# Patient Record
Sex: FEMALE | Race: WHITE | NOT HISPANIC OR LATINO | Employment: OTHER | URBAN - METROPOLITAN AREA
[De-identification: names, ages, dates, MRNs, and addresses within clinical notes are randomized per-mention and may not be internally consistent; named-entity substitution may affect disease eponyms.]

---

## 2017-01-18 ENCOUNTER — TRANSFERRED RECORDS (OUTPATIENT)
Dept: HEALTH INFORMATION MANAGEMENT | Facility: CLINIC | Age: 82
End: 2017-01-18

## 2017-01-20 ENCOUNTER — OFFICE VISIT (OUTPATIENT)
Dept: FAMILY MEDICINE | Facility: CLINIC | Age: 82
End: 2017-01-20

## 2017-01-20 VITALS
TEMPERATURE: 98.2 F | SYSTOLIC BLOOD PRESSURE: 138 MMHG | DIASTOLIC BLOOD PRESSURE: 86 MMHG | HEART RATE: 73 BPM | OXYGEN SATURATION: 92 % | BODY MASS INDEX: 24.7 KG/M2 | WEIGHT: 132 LBS | RESPIRATION RATE: 16 BRPM

## 2017-01-20 DIAGNOSIS — S69.91XA HAND TRAUMA, RIGHT, INITIAL ENCOUNTER: Primary | ICD-10-CM

## 2017-01-20 DIAGNOSIS — I10 ESSENTIAL HYPERTENSION, BENIGN: ICD-10-CM

## 2017-01-20 DIAGNOSIS — E03.4 HYPOTHYROIDISM DUE TO ACQUIRED ATROPHY OF THYROID: ICD-10-CM

## 2017-01-20 DIAGNOSIS — E78.2 MIXED HYPERLIPIDEMIA: ICD-10-CM

## 2017-01-20 ASSESSMENT — ENCOUNTER SYMPTOMS
FATIGUE: 0
DYSURIA: 0
CHEST TIGHTNESS: 0
DIARRHEA: 0
MYALGIAS: 0
DYSPHORIC MOOD: 0
ARTHRALGIAS: 0
NERVOUS/ANXIOUS: 0
FEVER: 0
VOMITING: 0
NUMBNESS: 0
EYES NEGATIVE: 1
ABDOMINAL DISTENTION: 0
CONSTIPATION: 0
SLEEP DISTURBANCE: 0
POLYDIPSIA: 0
BLOOD IN STOOL: 0
COUGH: 0
PALPITATIONS: 0
COLOR CHANGE: 0
ABDOMINAL PAIN: 0
DIFFICULTY URINATING: 0
SHORTNESS OF BREATH: 0

## 2017-01-20 NOTE — PATIENT INSTRUCTIONS
Here is the plan from today's visit    1. Hand trauma, right, initial encounter      2. Essential hypertension, benign goal <150  LABS CAN BE DONE WHEN YOU COME IN WITH YOUR  SCHEDULE A NURSE ONLY VISIT TO HAVE YOUR BP CHECKED   - Comprehensive Metabolic Panel ([facility]); Future  - Hemoglobin A1c ([facility]); Future  - Lipid Panel ([facility]); Future    3. Mixed hyperlipidemia      4. Hypothyroidism due to acquired atrophy of thyroid    - TSH with free T4 reflex      Please call or return to clinic if your symptoms don't go away.    Follow up plan  SCHEDULE NURSE ONLY VISIT FOR BP AND LABS    Thank you for coming to [facility] today.  Lab Testing:  **If you had lab testing today and your results are reassuring or normal they will be mailed to you or sent through [employer] within 7 days.   **If the lab tests need quick action we will call you with the results.  The phone number we will call with results is # [phone] (home) . If this is not the best number please call our clinic and change the number.  Medication Refills:  If you need any refills please call your pharmacy and they will contact us.   If you need to  your refill at a new pharmacy, please contact the new pharmacy directly. The new pharmacy will help you get your medications transferred faster.   Scheduling:  If you have any concerns about today's visit or wish to schedule another appointment please call our office during normal business hours [phone] (8-5:00 M-F)  If a referral was made to a [facility] and you don't get a call from central scheduling please call [phone].  If a Mammogram was ordered for you at [facility] call [phone] to schedule or change your appointment.  If you had an XRay/CT/Ultrasound/MRI ordered the number is [phone] to schedule or change your radiology appointment.   Medical Concerns:  If you have urgent medical concerns please call [phone] at any time of the  day.

## 2017-01-20 NOTE — MR AVS SNAPSHOT
After Visit Summary   1/20/2017    Brooke Xie    MRN: [id number]           Patient Information     Date Of Birth          1/28/1933        Visit Information        Provider Department      1/20/2017 10:20 AM Martin Walton MD [facility]        Today's Diagnoses     Hand trauma, right, initial encounter    -  1     Essential hypertension, benign goal <150         Mixed hyperlipidemia         Hypothyroidism due to acquired atrophy of thyroid           Care Instructions    Here is the plan from today's visit    1. Hand trauma, right, initial encounter      2. Essential hypertension, benign goal <150  LABS CAN BE DONE WHEN YOU COME IN WITH YOUR  SCHEDULE A NURSE ONLY VISIT TO HAVE YOUR BP CHECKED   - Comprehensive Metabolic Panel ([facility]); Future  - Hemoglobin A1c ([facility]); Future  - Lipid Panel ([facility]); Future    3. Mixed hyperlipidemia      4. Hypothyroidism due to acquired atrophy of thyroid    - TSH with free T4 reflex      Please call or return to clinic if your symptoms don't go away.    Follow up plan  SCHEDULE NURSE ONLY VISIT FOR BP AND LABS    Thank you for coming to [facility] today.  Lab Testing:  **If you had lab testing today and your results are reassuring or normal they will be mailed to you or sent through [employer] within 7 days.   **If the lab tests need quick action we will call you with the results.  The phone number we will call with results is # [phone] (home) . If this is not the best number please call our clinic and change the number.  Medication Refills:  If you need any refills please call your pharmacy and they will contact us.   If you need to  your refill at a new pharmacy, please contact the new pharmacy directly. The new pharmacy will help you get your medications transferred faster.   Scheduling:  If you have any concerns about today's visit or wish to schedule another appointment please call our office during normal  business hours [phone] (8-5:00 M-F)  If a referral was made to a [facility] and you don't get a call from central scheduling please call [phone].  If a Mammogram was ordered for you at [facility] call [phone] to schedule or change your appointment.  If you had an XRay/CT/Ultrasound/MRI ordered the number is [phone] to schedule or change your radiology appointment.   Medical Concerns:  If you have urgent medical concerns please call [phone] at any time of the day.            Follow-ups after your visit        Your next 10 appointments already scheduled     Feb 03, 2017  9:40 AM   LAB VISIT with [facility]   [facility] ([facility])    [address],  [place] [id number] [phone]           If you are coming in for fasting labs, you will need to fast for 10-12 hours prior to your appt. Fasting labs include lipids, cholesterol, glucose, complete metabolic panel, basic metabolic panel, and triglycerides. Do not drink coffee or any other fluids. Water with medications are okay. Do not chew gum as well. If you have any further questions, please contact your health care team.              Feb 03, 2017 10:00 AM   Nurse Visit with [facility]   [facility] ([facility])    [address],  [address] [id number] [phone]              Future tests that were ordered for you today     Open Future Orders        Priority Expected Expires Ordered    TSH with free T4 reflex Routine  1/20/2018 1/20/2017    Comprehensive Metabolic Panel ([facility]) Routine  1/20/2018 1/20/2017    Hemoglobin A1c ([facility]) Routine  1/20/2018 1/20/2017    Lipid Panel ([facility]) Routine  3/21/2017 1/20/2017            Who to contact     Please call your clinic at [phone] to:    Ask questions about your health    Make or cancel appointments    Discuss your medicines    Learn about your test  results    Speak to your doctor   If you have compliments or concerns about an experience at your clinic, or if you wish to file a complaint, please contact [facility] Patient Relations at [phone] or email us at [email or web address]         Additional Information About Your Visit        [employer] Information     [employer] is an electronic gateway that provides easy, online access to your medical records. With "[employer] you can request a clinic appointment, read your test results, renew a prescription or communicate with your care team.     To sign up for "[employer] visit the website at [email or web address]   You will be asked to enter the access code listed below, as well as some personal information. Please follow the directions to create your username and password.     Your access code is: [email or web address]  Expires: 2017 11:12 AM     Your access code will  in 90 days. If you need help or a new code, please contact your [facility] or call [phone] for assistance.        Care EveryWhere ID     This is your Care EveryWhere ID. This could be used by other organizations to access your [place] medical records  [id number]        Your Vitals Were     Pulse Temperature Respirations Pulse Oximetry          73 98.2  F (36.8  C) (Oral) 16 92%         Blood Pressure from Last 3 Encounters:   17 138/86   16 137/79   09/18/15 168/82    Weight from Last 3 Encounters:   17 132 lb (59.875 kg)   16 143 lb 9.6 oz (65.137 kg)   09/18/15 141 lb (63.957 kg)                 Today's Medication Changes          These changes are accurate as of: 17  2:57 PM.  If you have any questions, ask your nurse or doctor.               Stop taking these medicines if you haven't already. Please contact your care team if you have questions.     amLODIPine 2.5 MG tablet   Commonly known as:  NORVASC   Stopped by:  Martin Walton MD            gabapentin 100 MG capsule   Commonly known as:  NEURONTIN   Stopped by:  Martin Walton MD                    Primary Care Provider Office Phone # Fax #    Martin Walton [phone]       [facility] [address] [address]        Thank you!     Thank you for choosing [facility]  for your care. Our goal is always to provide you with excellent care. Hearing back from our patients is one way we can continue to improve our services. Please take a few minutes to complete the written survey that you may receive in the mail after your visit with us. Thank you!             Your Updated Medication List - Protect others around you: Learn how to safely use, store and throw away your medicines at www.disposemymeds.org.          This list is accurate as of: 1/20/17  2:57 PM.  Always use your most recent med list.                   Brand Name Dispense Instructions for use    albuterol 108 (90 BASE) MCG/ACT Inhaler    PROAIR HFA/PROVENTIL HFA/VENTOLIN HFA    3 Inhaler    Inhale 2 puffs into the lungs every 6 hours as needed for shortness of breath / dyspnea or wheezing       ASPIRIN LOW DOSE 81 MG tablet   Generic drug:  aspirin      Take  by mouth daily.       CALTRATE 600 PLUS-VIT D OR      Take 1 tablet by mouth daily.       co-enzyme Q-10 30 MG Caps capsule      Take 30 mg by mouth daily.       fluticasone-salmeterol 100-50 MCG/DOSE diskus inhaler    ADVAIR DISKUS    60 Inhaler    Inhale 1 puff into the lungs every 12 hours       hydrochlorothiazide 25 MG tablet    HYDRODIURIL    30 tablet    Take 1 tablet (25 mg) by mouth daily       hydrocortisone 2.5 % cream      Apply  topically 2 times daily. As needed       levothyroxine 50 MCG tablet    SYNTHROID/LEVOTHROID    90 tablet    Take 1 tablet (50 mcg) by mouth daily       montelukast 10 MG tablet    SINGULAIR    90 tablet    Take 1 tablet (10 mg) by mouth daily       MULTIVITAMIN PO      Take  by mouth  daily.       OPTIVAR 0.05 % Soln ophthalmic solution   Generic drug:  azelastine      Apply 1 drop to eye 2 times daily. As directed       order for DME     1 Units    Equipment being ordered: Cane       potassium chloride 10 MEQ tablet    K-TAB,KLOR-CON    90 tablet    Take 1 tablet (10 mEq) by mouth daily       SIMPLY SALINE 0.9 % Aers   Generic drug:  saline      Spray  in nostril. prn       simvastatin 20 MG tablet    ZOCOR    90 tablet    Take 1 tablet (20 mg) by mouth At Bedtime

## 2017-01-20 NOTE — PROGRESS NOTES
HPI:       Brooke Xie is a 83 year old who presents for the following  Patient presents with:  Hypertension: follow up   Results: follow up   Recheck Medication: no longer taking gabapentin causing the swelling in legs from hips down, pt thinks the amlodipine is causing swelling in her ankles       Hypertension Follow-up      Outpatient blood pressures are not being checked.    Chest Pain? :No     Low Salt Diet: not monitoring salt    Daily NSAID Use? YES 81 mg aspirin     Did patient take their HTN pills today/last night as usual?  Yes    She does report some swelling that is bothering her even though she stopped gabapentin.            Thumb  -pt hurt her right thumb, sore rib, head, black and blue knee when falling after  walked into her without warning accidentally (he is blind)   -the thumb is black and blue with tenderness to the joint   -swelling in the thumb but getting better   -tenderness in rib but bruising is going away   -xray recommended   -able to move but having pain with movement         Labs  -last labs in April, 2015  -pt states  will have done on when  comes in for visit      Chemical hypersensitivity reactionon face from a blanket  -Dr. Gabriel dermatologist   -burns face   -when received a blanket from order and opened it the fumes burned he face, was seen and now healing       Medication  -pt states she is weaning off of the gabapentin for the swelling in legs   -was told that the amlodipine causing swelling in ankles   -discussed stopping the amlodipine will monitor BP       Adherence and Exercise  Medication side effects: would like to discuss gabapentin and amlodipine   How often is a medication missed? Never  Are you able to follow the treatment plan? Yes  Exercise:walking  6-7 days/week for an average of 30-45 minutes        Problem, Medication and Allergy Lists were reviewed and are current.  Patient is an established patient of this clinic.         Review of  Systems:   Review of Systems   Constitutional: Negative for fever and fatigue.   HENT: Negative.    Eyes: Negative.  Negative for visual disturbance.   Respiratory: Negative for cough, chest tightness and shortness of breath.    Cardiovascular: Negative for chest pain and palpitations.   Gastrointestinal: Negative for vomiting, abdominal pain, diarrhea, constipation, blood in stool and abdominal distention.   Endocrine: Negative for polydipsia and polyuria.   Genitourinary: Negative for dysuria and difficulty urinating.   Musculoskeletal: Negative for myalgias and arthralgias.   Skin: Negative for color change and rash.   Neurological: Negative for numbness.   Psychiatric/Behavioral: Negative for sleep disturbance and dysphoric mood. The patient is not nervous/anxious.              Physical Exam:     Patient Vitals for the past 24 hrs:   BP Temp Temp src Pulse Resp SpO2 Weight   01/20/17 1042 138/86 mmHg 98.2  F (36.8  C) Oral 73 16 92 % 132 lb (59.875 kg)     Body mass index is 24.7 kg/(m^2).  Vitals were reviewed and were normal       Physical Exam   Constitutional: She is oriented to person, place, and time. She appears well-developed. No distress.   HENT:   Head: Normocephalic.   Eyes: Conjunctivae are normal. No scleral icterus.   Neck: Normal range of motion. No thyromegaly present.   Cardiovascular: Normal rate, regular rhythm, normal heart sounds and intact distal pulses.    No murmur heard.  Pulmonary/Chest: Effort normal and breath sounds normal. No respiratory distress. She has no wheezes.   Abdominal: Soft. Bowel sounds are normal. She exhibits no distension. There is no splenomegaly or hepatomegaly. There is no tenderness.   Musculoskeletal: She exhibits no edema.   Lymphadenopathy:     She has no cervical adenopathy.   Neurological: She is alert and oriented to person, place, and time.   Skin: Skin is warm and dry. She is not diaphoretic.   Psychiatric: She has a normal mood and affect. Her behavior is  normal. Judgment and thought content normal.   Vitals reviewed.        Results:      Results from the last 24 hoursNo results found for this or any previous visit (from the past 24 hour(s)).  Assessment and Plan     Patient Instructions   Here is the plan from today's visit    1. Hand trauma, right, initial encounter  Patient declined Xray as it is improving, also declined splint    2. Essential hypertension, benign goal <150  Currently controlled -will stop amlodipine and check on follow up wihether this needs to be switched or restarted or just discontinued.  LABS CAN BE DONE WHEN YOU COME IN WITH YOUR  SCHEDULE A NURSE ONLY VISIT TO HAVE YOUR BP CHECKED   - Comprehensive Metabolic Panel ([facility]); Future  - Hemoglobin A1c ([facility]); Future  - Lipid Panel ([facility]); Future    3. Mixed hyperlipidemia  Continue current medications    4. Hypothyroidism due to acquired atrophy of thyroid  No change in levothyroxine  - TSH with free T4 reflex      Please call or return to clinic if your symptoms don't go away.    Follow up plan  SCHEDULE NURSE ONLY VISIT FOR BP AND LABS      Medications Discontinued During This Encounter   Medication Reason     gabapentin (NEURONTIN) 100 MG capsule      amLODIPine (NORVASC) 2.5 MG tablet      Options for treatment and follow-up care were reviewed with the patient. Brooke Xie  engaged in the decision making process and verbalized understanding of the options discussed and agreed with the final plan.    Martin Walton MD

## 2017-01-24 DIAGNOSIS — E78.2 MIXED HYPERLIPIDEMIA: Primary | ICD-10-CM

## 2017-01-24 RX ORDER — SIMVASTATIN 20 MG
20 TABLET ORAL AT BEDTIME
Qty: 90 TABLET | Refills: 3 | Status: SHIPPED | OUTPATIENT
Start: 2017-01-24 | End: 2018-02-15

## 2017-01-24 NOTE — TELEPHONE ENCOUNTER
Simvastatin 20mg tabs     Last Written Prescription Date: 01/15/2016  Last Fill Quantity: 90, # refills: 01  Last Office Visit with [facility], [facility] or [facility] prescribing provider: 01/20/2017       CHOL    191.4   4/25/2016  HDL     50.6   4/25/2016  LDL      111   4/25/2016  LDL     75.0   7/21/2014  TRIG    150.4   4/25/2016  CHOLHDLRATIO      3.8   4/25/2016

## 2017-02-03 ENCOUNTER — ALLIED HEALTH/NURSE VISIT (OUTPATIENT)
Dept: FAMILY MEDICINE | Facility: CLINIC | Age: 82
End: 2017-02-03

## 2017-02-03 VITALS — DIASTOLIC BLOOD PRESSURE: 76 MMHG | SYSTOLIC BLOOD PRESSURE: 155 MMHG

## 2017-02-03 DIAGNOSIS — I10 ESSENTIAL HYPERTENSION, BENIGN: Primary | ICD-10-CM

## 2017-02-08 DIAGNOSIS — E03.4 HYPOTHYROIDISM DUE TO ACQUIRED ATROPHY OF THYROID: Primary | ICD-10-CM

## 2017-02-08 RX ORDER — LEVOTHYROXINE SODIUM 50 UG/1
50 TABLET ORAL DAILY
Qty: 90 TABLET | Refills: 3 | Status: SHIPPED | OUTPATIENT
Start: 2017-02-08 | End: 2018-02-19

## 2017-02-08 NOTE — TELEPHONE ENCOUNTER
Levothyroxine 50mcg tabs     Last Written Prescription Date: 02/15/2016  Last Quantity: 90, # refills: 1  Last Office Visit with G, P or [facility] prescribing provider: 01/20/2017        TSH   Date Value Ref Range Status   04/25/2016 0.69 0.40 - 4.00 mU/L Final

## 2017-03-03 DIAGNOSIS — I10 ESSENTIAL HYPERTENSION, BENIGN: ICD-10-CM

## 2017-03-03 DIAGNOSIS — E03.4 HYPOTHYROIDISM DUE TO ACQUIRED ATROPHY OF THYROID: ICD-10-CM

## 2017-03-03 LAB
ALBUMIN SERPL-MCNC: 4.2 MG/DL (ref 3.5–4.7)
ALP SERPL-CCNC: 79.2 U/L (ref 31.7–110.5)
ALT SERPL-CCNC: 20.2 U/L (ref 0–45)
AST SERPL-CCNC: 22.2 U/L (ref 0–45)
BILIRUB SERPL-MCNC: 0.7 MG/DL (ref 0.2–1.3)
BUN SERPL-MCNC: 17.2 MG/DL (ref 7–19)
CALCIUM SERPL-MCNC: 9.5 MG/DL (ref 8.5–10.1)
CHLORIDE SERPLBLD-SCNC: 97 MMOL/L (ref 98–110)
CHOLEST SERPL-MCNC: 167.1 MG/DL (ref 0–200)
CHOLEST/HDLC SERPL: 3.4 {RATIO} (ref 0–5)
CO2 SERPL-SCNC: 34.3 MMOL/L (ref 20–32)
CREAT SERPL-MCNC: 0.8 MG/DL (ref 0.5–1)
GFR SERPL CREATININE-BSD FRML MDRD: 72.6 ML/MIN/1.7 M2
GLUCOSE SERPL-MCNC: 109.1 MG'DL (ref 70–99)
HBA1C MFR BLD: 5.4 % (ref 4.1–5.7)
HDLC SERPL-MCNC: 49.8 MG/DL
LDLC SERPL CALC-MCNC: 76 MG/DL (ref 0–129)
POTASSIUM SERPL-SCNC: 3.5 MMOL/DL (ref 3.3–4.5)
PROT SERPL-MCNC: 7.6 G/DL (ref 6.8–8.8)
SODIUM SERPL-SCNC: 136.6 MMOL/L (ref 132.6–141.4)
TRIGL SERPL-MCNC: 204.1 MG/DL (ref 0–150)
TSH SERPL DL<=0.005 MIU/L-ACNC: 1.07 MU/L (ref 0.4–4)
VLDL CHOLESTEROL: 40.8 MG/DL (ref 7–32)

## 2017-04-03 DIAGNOSIS — I10 BENIGN ESSENTIAL HYPERTENSION: ICD-10-CM

## 2017-04-03 NOTE — TELEPHONE ENCOUNTER
Potassium      Last Written Prescription Date: 3/31/2016  Last Fill Quantity: 90, # refills: 3  Last Office Visit with [facility], [facility] or [facility] prescribing provider: 1/20/2017       Potassium   Date Value Ref Range Status   03/03/2017 3.5 3.3 - 4.5 mmol/dL Final     Creatinine   Date Value Ref Range Status   03/03/2017 0.8 0.5 - 1.0 mg/dL Final     BP Readings from Last 3 Encounters:   02/03/17 155/76   01/20/17 138/86   04/25/16 137/79

## 2017-04-04 RX ORDER — POTASSIUM CHLORIDE 750 MG/1
10 TABLET, EXTENDED RELEASE ORAL DAILY
Qty: 90 TABLET | Refills: 3 | Status: SHIPPED | OUTPATIENT
Start: 2017-04-04 | End: 2018-04-04

## 2017-04-24 ENCOUNTER — TRANSFERRED RECORDS (OUTPATIENT)
Dept: HEALTH INFORMATION MANAGEMENT | Facility: CLINIC | Age: 82
End: 2017-04-24

## 2017-05-11 ENCOUNTER — TELEPHONE (OUTPATIENT)
Dept: FAMILY MEDICINE | Facility: CLINIC | Age: 82
End: 2017-05-11

## 2017-05-11 DIAGNOSIS — I10 ESSENTIAL HYPERTENSION, BENIGN: ICD-10-CM

## 2017-05-11 DIAGNOSIS — E78.2 MIXED HYPERLIPIDEMIA: Primary | ICD-10-CM

## 2017-05-11 NOTE — TELEPHONE ENCOUNTER
"[facility] phone call message - order or referral request for patient:     Order or referral being requested: Labs      Additional Comments: Patient is unsure of what labs but stated she needs \"full labs\". If orders need to be put in, please add them but please call her either way. She is anticipating on coming in with her  on 6/5/17    OK to leave a message on voice mail? Yes    Primary language: English      needed? No    Call taken on May 11, 2017 at 9:53 AM by Marysol Meza      "

## 2017-05-11 NOTE — TELEPHONE ENCOUNTER
Message routed to FD to schedule lab appointment if needed. Otherwise labs entered for next appointment.    Jessi Felix RN

## 2017-05-11 NOTE — TELEPHONE ENCOUNTER
Message routed to PCP to advise if labs are needed or to enter future orders for next appointment.    Jessi Felix RN

## 2017-05-30 DIAGNOSIS — I10 ESSENTIAL HYPERTENSION, BENIGN: ICD-10-CM

## 2017-05-30 RX ORDER — HYDROCHLOROTHIAZIDE 25 MG/1
25 TABLET ORAL DAILY
Qty: 30 TABLET | Refills: 6 | Status: SHIPPED | OUTPATIENT
Start: 2017-05-30 | End: 2017-11-30

## 2017-05-30 NOTE — TELEPHONE ENCOUNTER
Hctz      Last Written Prescription Date: 11/21/2016  Last Fill Quantity: 30, # refills: 6  Last Office Visit with [facility], P or [facility] prescribing provider: 1/20/2017       Potassium   Date Value Ref Range Status   03/03/2017 3.5 3.3 - 4.5 mmol/dL Final     Creatinine   Date Value Ref Range Status   03/03/2017 0.8 0.5 - 1.0 mg/dL Final     BP Readings from Last 3 Encounters:   02/03/17 155/76   01/20/17 138/86   04/25/16 137/79

## 2017-06-05 DIAGNOSIS — E78.2 MIXED HYPERLIPIDEMIA: ICD-10-CM

## 2017-06-05 DIAGNOSIS — I10 ESSENTIAL HYPERTENSION, BENIGN: ICD-10-CM

## 2017-06-05 LAB
ALBUMIN SERPL-MCNC: 4.1 MG/DL (ref 3.5–4.7)
ALP SERPL-CCNC: 70.9 U/L (ref 31.7–110.5)
ALT SERPL-CCNC: 19.1 U/L (ref 0–45)
AST SERPL-CCNC: 20.8 U/L (ref 0–45)
BILIRUB SERPL-MCNC: 0.5 MG/DL (ref 0.2–1.3)
BUN SERPL-MCNC: 19.7 MG/DL (ref 7–19)
CALCIUM SERPL-MCNC: 9.1 MG/DL (ref 8.5–10.1)
CHLORIDE SERPLBLD-SCNC: 101.6 MMOL/L (ref 98–110)
CHOLEST SERPL-MCNC: 192.8 MG/DL (ref 0–200)
CHOLEST/HDLC SERPL: 3.9 {RATIO} (ref 0–5)
CO2 SERPL-SCNC: 31.1 MMOL/L (ref 20–32)
CREAT SERPL-MCNC: 0.8 MG/DL (ref 0.5–1)
GFR SERPL CREATININE-BSD FRML MDRD: 72.6 ML/MIN/1.7 M2
GLUCOSE SERPL-MCNC: 90.9 MG'DL (ref 70–99)
HDLC SERPL-MCNC: 49.5 MG/DL
LDLC SERPL CALC-MCNC: 111 MG/DL (ref 0–129)
POTASSIUM SERPL-SCNC: 3.7 MMOL/DL (ref 3.3–4.5)
PROT SERPL-MCNC: 7 G/DL (ref 6.8–8.8)
SODIUM SERPL-SCNC: 138.1 MMOL/L (ref 132.6–141.4)
TRIGL SERPL-MCNC: 161.3 MG/DL (ref 0–150)
VLDL CHOLESTEROL: 32.3 MG/DL (ref 7–32)

## 2017-09-22 DIAGNOSIS — J30.1 CHRONIC ALLERGIC RHINITIS DUE TO POLLEN, UNSPECIFIED SEASONALITY: Primary | ICD-10-CM

## 2017-09-22 RX ORDER — MONTELUKAST SODIUM 10 MG/1
10 TABLET ORAL DAILY
Qty: 90 TABLET | Refills: 3 | Status: SHIPPED | OUTPATIENT
Start: 2017-09-22 | End: 2018-03-21

## 2017-09-22 NOTE — TELEPHONE ENCOUNTER
Montelukast       Last Written Prescription Date: 6/15/2016  Last Fill Quantity: 90, # refills: 3    Last Office Visit with G, P or [facility] prescribing provider:  1/20/2017   Future Office Visit:       Date of Last Asthma Action Plan Letter:   There are no preventive care reminders to display for this patient.   Asthma Control Test:   ACT Total Scores 4/25/2016   ACT TOTAL SCORE -   ASTHMA ER VISITS -   ASTHMA HOSPITALIZATIONS -   ACT TOTAL SCORE (Goal Greater than or Equal to 20) 25   In the past 12 months, how many times did you visit the emergency room for your asthma without being admitted to the hospital? 0   In the past 12 months, how many times were you hospitalized overnight because of your asthma? 0       Date of Last Spirometry Test:   No results found for this or any previous visit.

## 2017-10-12 ENCOUNTER — ALLIED HEALTH/NURSE VISIT (OUTPATIENT)
Dept: FAMILY MEDICINE | Facility: CLINIC | Age: 82
End: 2017-10-12

## 2017-10-12 DIAGNOSIS — Z23 NEED FOR INFLUENZA VACCINATION: Primary | ICD-10-CM

## 2017-11-30 DIAGNOSIS — I10 ESSENTIAL HYPERTENSION, BENIGN: ICD-10-CM

## 2017-11-30 RX ORDER — HYDROCHLOROTHIAZIDE 25 MG/1
25 TABLET ORAL DAILY
Qty: 30 TABLET | Refills: 6 | Status: SHIPPED | OUTPATIENT
Start: 2017-11-30 | End: 2018-02-19

## 2018-02-09 ENCOUNTER — TELEPHONE (OUTPATIENT)
Dept: FAMILY MEDICINE | Facility: CLINIC | Age: 83
End: 2018-02-09

## 2018-02-09 DIAGNOSIS — E78.2 MIXED HYPERLIPIDEMIA: ICD-10-CM

## 2018-02-09 NOTE — TELEPHONE ENCOUNTER
[facility] phone call message- patient requesting a refill:    Full Medication Name: simvastatin (ZOCOR) 20 MG tablet    Dose: Take 1 tablet (20 mg) by mouth At Bedtime    Pharmacy confirmed as   [facility]   Fax: [phone]  : Yes    Additional Comments: -     OK to leave a message on voice mail? Yes    Primary language: English      needed? No    Call taken on February 9, 2018 at 8:56 AM by Kim Oneil

## 2018-02-14 RX ORDER — SIMVASTATIN 20 MG
20 TABLET ORAL AT BEDTIME
Qty: 90 TABLET | Refills: 3 | Status: CANCELLED | OUTPATIENT
Start: 2018-02-14

## 2018-02-14 NOTE — TELEPHONE ENCOUNTER
Request for medication refill:    Date of last visit at clinic: 1/20/17    Please complete refill if appropriate and CLOSE ENCOUNTER.    Closing the encounter signifies the refill is complete.    If refill has been denied, please complete the smart phrase .smirefuse and route it to the [facility] RN TRIAGE pool to inform the patient and the pharmacy.    Monika Issa RN

## 2018-02-15 DIAGNOSIS — E78.2 MIXED HYPERLIPIDEMIA: ICD-10-CM

## 2018-02-15 RX ORDER — SIMVASTATIN 20 MG
20 TABLET ORAL AT BEDTIME
Qty: 90 TABLET | Refills: 3 | Status: SHIPPED | OUTPATIENT
Start: 2018-02-15 | End: 2019-02-14

## 2018-02-19 ENCOUNTER — TELEPHONE (OUTPATIENT)
Dept: FAMILY MEDICINE | Facility: CLINIC | Age: 83
End: 2018-02-19

## 2018-02-19 DIAGNOSIS — E03.4 HYPOTHYROIDISM DUE TO ACQUIRED ATROPHY OF THYROID: ICD-10-CM

## 2018-02-19 DIAGNOSIS — I10 ESSENTIAL HYPERTENSION, BENIGN: ICD-10-CM

## 2018-02-19 RX ORDER — LEVOTHYROXINE SODIUM 50 UG/1
50 TABLET ORAL DAILY
Qty: 90 TABLET | Refills: 3 | Status: SHIPPED | OUTPATIENT
Start: 2018-02-19 | End: 2019-03-01

## 2018-02-19 RX ORDER — HYDROCHLOROTHIAZIDE 25 MG/1
25 TABLET ORAL DAILY
Qty: 30 TABLET | Refills: 6 | Status: SHIPPED | OUTPATIENT
Start: 2018-02-19 | End: 2018-03-15

## 2018-02-19 NOTE — TELEPHONE ENCOUNTER
Request for medication refill:    Date of last visit at clinic: 01/20/2018    Please complete refill if appropriate and CLOSE ENCOUNTER.    Closing the encounter signifies the refill is complete.    If refill has been denied, please complete the smart phrase .smirefuse and route it to the [facility] RN TRIAGE pool to inform the patient and the pharmacy.    Christian Sanchez, CMA

## 2018-02-19 NOTE — TELEPHONE ENCOUNTER
[facility] phone call message- patient requesting a refill:    Full Medication Name:     levothyroxine 50 MCG tablet  hydrochlorothiazide 25 MG tablet      Pharmacy confirmed as   [facility]   T:[phone]  Fax:[phone] [address]  [facility] [address]    : Yes    Additional Comments: Pt requesting a 90 day supply of both medication     OK to leave a message on voice mail? Yes    Primary language: English      needed? No    Call taken on February 19, 2018 at 11:41 AM by Edna Barber

## 2018-03-14 DIAGNOSIS — I10 ESSENTIAL HYPERTENSION, BENIGN: ICD-10-CM

## 2018-03-14 NOTE — TELEPHONE ENCOUNTER
[facility] phone call message- patient requesting a refill:    Full Medication Name: hydrochlorothiazide (HYDRODIURIL) 25 MG tablet    Pharmacy confirmed as   [facility] - [place], [place] - [facility] [address] [facility] [address] [facility] [address]  Phone: [phone] Fax: [phone]  : Yes    Additional Comments: Patient needs refill sent to [facility] Fax: [phone].  She has 10 left and says that it takes 10 days from the time they fax the request to get it in the mail.     OK to leave a message on voice mail? Yes    Primary language: English      needed? No    Call taken on March 14, 2018 at 1:11 PM by Lisa Trevino

## 2018-03-15 RX ORDER — HYDROCHLOROTHIAZIDE 25 MG/1
25 TABLET ORAL DAILY
Qty: 30 TABLET | Refills: 6 | Status: SHIPPED | OUTPATIENT
Start: 2018-03-15 | End: 2018-12-17

## 2018-03-15 NOTE — TELEPHONE ENCOUNTER
Per Call center: Patient needs refill sent to [facility] Fax: [phone].  She has 10 left and says that it takes 10 days from the time they fax the request to get it in the mail.     Request for medication refill:    Date of last visit at clinic: 11/30/17    Please complete refill if appropriate and CLOSE ENCOUNTER.    Closing the encounter signifies the refill is complete.    If refill has been denied, please complete the smart phrase .smirefuse and route it to the [facility] RN TRIAGE pool to inform the patient and the pharmacy.    Angléica Lam, RN

## 2018-03-21 DIAGNOSIS — J30.1 CHRONIC ALLERGIC RHINITIS DUE TO POLLEN, UNSPECIFIED SEASONALITY: ICD-10-CM

## 2018-03-21 RX ORDER — MONTELUKAST SODIUM 10 MG/1
10 TABLET ORAL DAILY
Qty: 90 TABLET | Refills: 3 | Status: SHIPPED | OUTPATIENT
Start: 2018-03-21 | End: 2019-03-25

## 2018-03-21 NOTE — TELEPHONE ENCOUNTER
[facility] phone call message- patient requesting a refill:    Full Medication Name: montelukast (SINGULAIR) 10 MG tablet    Dose:     Pharmacy confirmed as     [facility] - [place], [place] - [facility] [address] [facility] [address] [facility] [address]  Phone: [phone] Fax: [phone]  : Yes    Additional Comments:      OK to leave a message on voice mail? Yes    Primary language: English      needed? No    Call taken on March 21, 2018 at 10:38 AM by Edna Barber

## 2018-03-21 NOTE — TELEPHONE ENCOUNTER
Request for medication refill:    Date of last visit at clinic: 1/20/17    Please complete refill if appropriate and CLOSE ENCOUNTER.    Closing the encounter signifies the refill is complete.    If refill has been denied, please complete the smart phrase .smirefuse and route it to the [facility] RN TRIAGE pool to inform the patient and the pharmacy.    Angélica Lam, RN

## 2018-03-22 ENCOUNTER — TRANSFERRED RECORDS (OUTPATIENT)
Dept: HEALTH INFORMATION MANAGEMENT | Facility: CLINIC | Age: 83
End: 2018-03-22

## 2018-04-04 ENCOUNTER — TELEPHONE (OUTPATIENT)
Dept: FAMILY MEDICINE | Facility: CLINIC | Age: 83
End: 2018-04-04

## 2018-04-04 DIAGNOSIS — I10 BENIGN ESSENTIAL HYPERTENSION: ICD-10-CM

## 2018-04-04 NOTE — TELEPHONE ENCOUNTER
Request for medication refill:    Date of last visit at clinic: 1/20/2017    Please complete refill if appropriate and CLOSE ENCOUNTER.    Closing the encounter signifies the refill is complete.    If refill has been denied, please complete the smart phrase .smirefuse and route it to the [facility] RN TRIAGE pool to inform the patient and the pharmacy.    Modesta Vaughan, CMA

## 2018-04-04 NOTE — TELEPHONE ENCOUNTER
[facility] phone call message- patient requesting a refill:    Full Medication Name: potassium chloride (K-TAB,KLOR-CON) 10 MEQ tablet    Dose: Take 1 tablet (10 mEq) by mouth daily / Requesting quantity of 90.    Pharmacy confirmed as   [facility] Fax: [phone]  : Yes    Additional Comments: Patient states she only has 2 tablets left of medication. Requesting refill ASAP as the mail order pharmacy takes a few days for delivery.     OK to leave a message on voice mail? Yes    Primary language: English      needed? No    Call taken on April 4, 2018 at 11:47 AM by [place]

## 2018-04-05 RX ORDER — POTASSIUM CHLORIDE 750 MG/1
10 TABLET, EXTENDED RELEASE ORAL DAILY
Qty: 90 TABLET | Refills: 3 | Status: SHIPPED | OUTPATIENT
Start: 2018-04-05 | End: 2018-04-09

## 2018-04-09 ENCOUNTER — OFFICE VISIT (OUTPATIENT)
Dept: FAMILY MEDICINE | Facility: CLINIC | Age: 83
End: 2018-04-09
Payer: COMMERCIAL

## 2018-04-09 VITALS
OXYGEN SATURATION: 96 % | TEMPERATURE: 97.9 F | SYSTOLIC BLOOD PRESSURE: 130 MMHG | WEIGHT: 131 LBS | DIASTOLIC BLOOD PRESSURE: 83 MMHG | HEART RATE: 75 BPM | BODY MASS INDEX: 24.51 KG/M2

## 2018-04-09 DIAGNOSIS — I10 BENIGN ESSENTIAL HYPERTENSION: ICD-10-CM

## 2018-04-09 DIAGNOSIS — E03.4 HYPOTHYROIDISM DUE TO ACQUIRED ATROPHY OF THYROID: ICD-10-CM

## 2018-04-09 DIAGNOSIS — L71.0 PERIORAL DERMATITIS: Primary | ICD-10-CM

## 2018-04-09 LAB — TSH SERPL DL<=0.005 MIU/L-ACNC: 0.93 MU/L (ref 0.4–4)

## 2018-04-09 RX ORDER — POTASSIUM CHLORIDE 750 MG/1
10 TABLET, EXTENDED RELEASE ORAL DAILY
Qty: 90 TABLET | Refills: 3 | Status: SHIPPED | OUTPATIENT
Start: 2018-04-09 | End: 2018-04-12

## 2018-04-09 RX ORDER — METRONIDAZOLE 7.5 MG/G
GEL TOPICAL 2 TIMES DAILY
Qty: 45 G | Refills: 11 | Status: SHIPPED | OUTPATIENT
Start: 2018-04-09 | End: 2018-04-10

## 2018-04-09 NOTE — PATIENT INSTRUCTIONS
Here is the plan from today's visit    1. Perioral dermatitis  Use two times daily , avoid all other products, if not better in one month follow up with Dermatology  - metroNIDAZOLE (METROGEL) 0.75 % topical gel; Apply topically 2 times daily  Dispense: 45 g; Refill: 11    2. Benign essential hypertension  Continue   - potassium chloride (K-TAB,KLOR-CON) 10 MEQ tablet; Take 1 tablet (10 mEq) by mouth daily  Dispense: 90 tablet; Refill: 3    3. Hypothyroidism due to acquired atrophy of thyroid  I'll send you aletter or call if its abnormal  - TSH      Please call or return to clinic if your symptoms don't go away.    Follow up plan  Please make a clinic appointment for follow up with me (LUIS ANGEL HERNANDEZ) in 3-6  month for recheck.    Thank you for coming to [facility] today.  Lab Testing:  **If you had lab testing today and your results are reassuring or normal they will be mailed to you or sent through Getfugu within 7 days.   **If the lab tests need quick action we will call you with the results.  The phone number we will call with results is # [phone] (home) . If this is not the best number please call our clinic and change the number.  Medication Refills:  If you need any refills please call your pharmacy and they will contact us.   If you need to  your refill at a new pharmacy, please contact the new pharmacy directly. The new pharmacy will help you get your medications transferred faster.   Scheduling:  If you have any concerns about today's visit or wish to schedule another appointment please call our office during normal business hours [phone] (8-5:00 M-F)  If a referral was made to a [facility] and you don't get a call from central scheduling please call [phone].  If a Mammogram was ordered for you at [facility] call [phone] to schedule or change your appointment.  If you had an XRay/CT/Ultrasound/MRI ordered the number is [phone] to schedule  or change your radiology appointment.   Medical Concerns:  If you have urgent medical concerns please call [phone] at any time of the day.    Martin Walton MD   [facility] PERSONAL PREVENTIVE SERVICES PLAN - SERVICES     Review these tests with your doctor then decide which ones you want and take this page home for your reference                                                    IMMUNIZATIONS Description Recommend today?     Influenza (flu shot) Helps to prevent flu; you should get it every year No; is up to date.   PCV 13 Prevents pneumonia; given once No; is up to date.   PPSV 23 Prevents pneumonia; given once No; is up to date.   Tetanus Prevents tetanus; need every 10 years No; is up to date.   Herpes Zoster (shingles) Prevents or lessens symptoms from shingles; given once No; is up to date.   Hepatitis B  If you have any of the following risk factors you should be immunized for hepatitis B: severe kidney disease, people who live in the same house as a carrier of Hepatitis B virus, people who live in  institutions (e.g. nursing homes or group homes), homosexual men, patients with hemophilia who received Factor VIII or IX concentrates, abusers of illicit injectable drugs No; is up to date.     SCREENING TESTS     Description   Year of Last Screening   Recommended Today?   Heart disease screening blood tests    Cholesterol level Reducing cholesterol can reduce your risk of heart attacks by 25%.  Screening is recommended yearly if you are at risk of heart disease otherwise every 4-5 years 4/9/2018  No; is up to date.   Diabetes screening tests    Hemoglobin A1c blood test   Finding and treating diabetes early can reduce complications.  Screening recommended/covered yearly if you have high blood pressure, high cholesterol, obesity (BMI >30), or a history of high blood glucose tests; or 2 of the following: family history of diabetes, overweight (BMI >25 but <30), age 65 years or older, and a history of  diabetes of pregnancy or gave birth to baby weighing more than 9 lbs. 4/9/2018  No; is up to date.   Hepatitis B screening Finding hepatitis B early can reduce complications.  Screening is recommended for persons with selected risk factors. Not needed No: is not indicated today.   Hepatitis C screening Finding hepatitis C early can reduce complications.  Screening is recommended for all persons born from 1945 through 1965 and for those with selected other risk factors.  Not needed No: is not indicated today.   HIV screening Finding HIV early can reduce complications.  Screening is recommended for persons with risk factors for HIV infection. Not needed No: is not indicated today.   Glaucoma screening Early detection of glaucoma can prevent blindness.   Please talk to your eye doctor about this.       SCREENING TESTS     Description   Year of Last Screening   Recommended Today?   Colorectal cancer screening    Fecal occult blood test     Screening colonoscopy Screening for colon cancer has been shown to reduce death from colon cancer by 25-30%. Screening recommended to start at 50 years and continuing until age 75 years.   UTD No: is not indicated today.   Breast Cancer Screening (women)    Mammogram Mammogram screening for breast cancer has been shown to reduce the risk of dying from breast cancer and prolong life. Screening is recommended every 1-2 years for women aged 50 to 74 years.  Not indicated No: is not indicated today.   Cervical Cancer screening (women)    Pap Cervical pap smears can reduce cervical cancer. Screening is recommended annually if high risk (history of abnormal pap smears) otherwise every 2-3 years, stop screening at 65 years of age if history of normal paps. NOt indicated No: is not indicated today.   Screening for Osteoporosis:  Bone mass measurements (women)    Dexa Scan Screening and treating Osteoporosis can reduce the risk of hip and spine fractures. Screening is recommended in women 65  years or older and in women and men at risk of osteoporosis. Not desired Recommeded and patient declined.    Screening for Lung Cancer     Low-dose CT scanning Screening can reduce mortality in persons aged 55-80 who have smoked at least 30 pack-years and who are either still smoking or have quit in the past 15 years. NA No: is not indicated today.   Abdominal Aortic Aneurysm (AAA) screening    Ultrasound (US)   An aneurysm treated before rupture is very safe -a ruptured aneurysm can be fatal.  Screening  by US for AAA is limited to patients who meet one of the following criteria:    Men who are 65-75 years old and have smoked more than 100 cigarettes in their lifetime    Anyone with a family history of abdominal aortic aneurysm NA No: is not indicated today.       MEDICARE WELLNESS EXAM PATIENT INSTRUCTIONS    Yearly exam:     See your health care provider every year in order to review changes in your health, review medicines that you take, and discuss preventive care needs such as immunizations and cancer screening.    Get a flu shot each year.     Advance Directives:    If you have not done so, you are encouraged to complete advance directives and/or a living will.   More information about advance directives can be found at: http://www.[place]med.org/advocacy/Key-Issues/Advance-Directives    Nutrition:     Eat at least 5 servings of fruits and vegetables each day.     Eat whole-grain bread, whole-wheat pasta and brown rice instead of white grains and rice.     Talk to your doctor about Calcium and Vitamin D.     Lifestyle:    Exercise for at least 150 minutes a week (30 minutes a day, 5 days a week). This will help you control your weight and prevent disease.     Limit alcohol to one drink per day.     If you smoke, try to quit - your doctor will be happy to help.     Wear sunscreen to prevent skin cancer.     See your dentist every six months for an exam and cleaning.     See your eye doctor every 1 to 2 years to  screen for conditions such as glaucoma, macular degeneration and cataracts.

## 2018-04-09 NOTE — LETTER
April 9, 2018      Brooke Xie  [address]    [facility] [address]        Dear Brooke,    Thank you for getting your care at [facility]. Please see below for your test results. Always good to see you Brooke.  Your TSH is normal, take care.    Resulted Orders   TSH   Result Value Ref Range    TSH 0.93 0.40 - 4.00 mU/L       If you have any concerns about these results please call and leave a message for me or send a BestBoy Keyboard message to the clinic.    Sincerely,    Martin Walton MD

## 2018-04-09 NOTE — MR AVS SNAPSHOT
After Visit Summary   4/9/2018    Brooke Xie    MRN: [id number]           Patient Information     Date Of Birth          1/28/1933        Visit Information        Provider Department      4/9/2018 10:20 AM Martin Walton MD [facility]        Today's Diagnoses     Perioral dermatitis    -  1    Benign essential hypertension        Hypothyroidism due to acquired atrophy of thyroid          Care Instructions    Here is the plan from today's visit    1. Perioral dermatitis  Use two times daily , avoid all other products, if not better in one month follow up with Dermatology  - metroNIDAZOLE (METROGEL) 0.75 % topical gel; Apply topically 2 times daily  Dispense: 45 g; Refill: 11    2. Benign essential hypertension  Continue   - potassium chloride (K-TAB,KLOR-CON) 10 MEQ tablet; Take 1 tablet (10 mEq) by mouth daily  Dispense: 90 tablet; Refill: 3    3. Hypothyroidism due to acquired atrophy of thyroid  I'll send you aletter or call if its abnormal  - TSH      Please call or return to clinic if your symptoms don't go away.    Follow up plan  Please make a clinic appointment for follow up with me (MARTIN WALTON) in 3-6  month for recheck.    Thank you for coming to [facility] today.  Lab Testing:  **If you had lab testing today and your results are reassuring or normal they will be mailed to you or sent through Theatro within 7 days.   **If the lab tests need quick action we will call you with the results.  The phone number we will call with results is # [phone] (home) . If this is not the best number please call our clinic and change the number.  Medication Refills:  If you need any refills please call your pharmacy and they will contact us.   If you need to  your refill at a new pharmacy, please contact the new pharmacy directly. The new pharmacy will help you get your medications transferred faster.   Scheduling:  If you have any concerns about today's visit  or wish to schedule another appointment please call our office during normal business hours [phone] (8-5:00 M-F)  If a referral was made to a [facility] and you don't get a call from central scheduling please call [phone].  If a Mammogram was ordered for you at [facility] call [phone] to schedule or change your appointment.  If you had an XRay/CT/Ultrasound/MRI ordered the number is [phone] to schedule or change your radiology appointment.   Medical Concerns:  If you have urgent medical concerns please call [phone] at any time of the day.    Martin Walton MD            Follow-ups after your visit        Who to contact     Please call your clinic at [phone] to:    Ask questions about your health    Make or cancel appointments    Discuss your medicines    Learn about your test results    Speak to your doctor            Additional Information About Your Visit        Student Film Channel[employer] Information     FundRazr is an electronic gateway that provides easy, online access to your medical records. With FundRazr, you can request a clinic appointment, read your test results, renew a prescription or communicate with your care team.     To sign up for FundRazr visit the website at [email or web address]   You will be asked to enter the access code listed below, as well as some personal information. Please follow the directions to create your username and password.     Your access code is: [email or web address]  Expires: 2018 10:40 AM     Your access code will  in 90 days. If you need help or a new code, please contact your [facility] or call [phone] for assistance.        Care EveryWhere ID     This is your Care EveryWhere ID. This could be used by other organizations to access your [place] medical records  [id number]        Your Vitals Were     Pulse Temperature Pulse Oximetry Breastfeeding? BMI (Body Mass Index)       75 97.9  F (36.6   C) (Oral) 96% No 24.51 kg/m2        Blood Pressure from Last 3 Encounters:   04/09/18 130/83   02/03/17 155/76   01/20/17 138/86    Weight from Last 3 Encounters:   04/09/18 131 lb (59.4 kg)   01/20/17 132 lb (59.9 kg)   04/25/16 143 lb 9.6 oz (65.1 kg)              We Performed the Following     DNR (Do Not Resuscitate)     TSH          Today's Medication Changes          These changes are accurate as of 4/9/18 10:40 AM.  If you have any questions, ask your nurse or doctor.               Start taking these medicines.        Dose/Directions    metroNIDAZOLE 0.75 % topical gel   Commonly known as:  METROGEL   Used for:  Perioral dermatitis   Started by:  Martin Walton MD        Apply topically 2 times daily   Quantity:  45 g   Refills:  11         Stop taking these medicines if you haven't already. Please contact your care team if you have questions.     hydrocortisone 2.5 % cream   Stopped by:  Martin Walton MD                Where to get your medicines      These medications were sent to [facility] - [place], [place] - [address] [place] [address]  [address] [place] [address], [facility] [address]    Hours:  Mon-Fri 8:30am-5:00pm Toll Free [phone] Phone:  [phone]     metroNIDAZOLE 0.75 % topical gel    potassium chloride 10 MEQ tablet                Primary Care Provider Office Phone # Fax #    Martin Walton [phone]       [address] [address]        Equal Access to Services     [facility] Diego[facility]IL[facility] : Hadiraj rai Sorenu, waaxda luqadaha, qaybta kaalmada tierra, pete hyde. So [facility] [phone].    ATENCIÓN: Si habla español, tiene a romero disposición servicios gratuitos de asistencia lingüística. Llame al [phone].    We comply with applicable federal civil rights laws and [place] laws. We do not discriminate on the basis of race, color, national origin, age, disability, sex, sexual orientation, or  gender identity.            Thank you!     Thank you for choosing [facility] MEDICINE [facility]  for your care. Our goal is always to provide you with excellent care. Hearing back from our patients is one way we can continue to improve our services. Please take a few minutes to complete the written survey that you may receive in the mail after your visit with us. Thank you!             Your Updated Medication List - Protect others around you: Learn how to safely use, store and throw away your medicines at www.disposemymeds.org.          This list is accurate as of 4/9/18 10:40 AM.  Always use your most recent med list.                   Brand Name Dispense Instructions for use Diagnosis    albuterol 108 (90 BASE) MCG/ACT Inhaler    PROAIR HFA/PROVENTIL HFA/VENTOLIN HFA    3 Inhaler    Inhale 2 puffs into the lungs every 6 hours as needed for shortness of breath / dyspnea or wheezing    Moderate persistent asthma       ASPIRIN LOW DOSE 81 MG tablet   Generic drug:  aspirin      Take  by mouth daily.        CALTRATE 600 PLUS-VIT D OR      Take 1 tablet by mouth daily.        co-enzyme Q-10 30 MG Caps capsule      Take 30 mg by mouth daily.        fluticasone-salmeterol 100-50 MCG/DOSE diskus inhaler    ADVAIR DISKUS    60 Inhaler    Inhale 1 puff into the lungs every 12 hours    Unspecified asthma(493.90)       hydrochlorothiazide 25 MG tablet    HYDRODIURIL    30 tablet    Take 1 tablet (25 mg) by mouth daily    Essential hypertension, benign       levothyroxine 50 MCG tablet    SYNTHROID/LEVOTHROID    90 tablet    Take 1 tablet (50 mcg) by mouth daily    Hypothyroidism due to acquired atrophy of thyroid       metroNIDAZOLE 0.75 % topical gel    METROGEL    45 g    Apply topically 2 times daily    Perioral dermatitis       montelukast 10 MG tablet    SINGULAIR    90 tablet    Take 1 tablet (10 mg) by mouth daily    Chronic allergic rhinitis due to pollen, unspecified seasonality       MULTIVITAMIN PO      Take  by  mouth daily.        OPTIVAR 0.05 % Soln ophthalmic solution   Generic drug:  azelastine      Apply 1 drop to eye 2 times daily. As directed        order for DME     1 Units    Equipment being ordered: Cane    Balance problems       potassium chloride 10 MEQ tablet    K-TAB,KLOR-CON    90 tablet    Take 1 tablet (10 mEq) by mouth daily    Benign essential hypertension       SIMPLY SALINE 0.9 % Aers   Generic drug:  saline      Spray  in nostril. prn        simvastatin 20 MG tablet    ZOCOR    90 tablet    Take 1 tablet (20 mg) by mouth At Bedtime    Mixed hyperlipidemia

## 2018-04-09 NOTE — PROGRESS NOTES
>65 year old Wellness Visit ( Medicare etc)         HPI       This 85 year old female presents as an established patient of Dr. Martin Walton who presents for a  Annual Wellness Exam.    Other issues patient wants to address today:    Perioral rash     Patient reports no new medications. Continues on current medication regiment for treatment of HTN, HLD, asthma, hypothyroidism without issues.     Reports new perioral rash over the last several months that is associated with pruritis and burning sensation. Has tried 2% ketokonazole and 2.5 % hydrocortisone without relief.     Patient Active Problem List   Diagnosis     Allergic rhinitis     Moderate persistent asthma     Essential hypertension, benign goal <150     Diaphragmatic hernia     Hyperlipidemia     Hypothyroidism     Hereditary and idiopathic peripheral neuropathy     Health Care Home     Bleeding (clots slowly)       History reviewed. No pertinent past medical history.     Family History   Problem Relation Age of Onset     Cancer - colorectal Father       age 94     C.A.D. Father       age 94     Melanoma Mother       age 76     Breast Cancer Sister       age 54         Problem List, Family History and Past Medical History reviewed and updated.       Health Risk Assessment / Review of Systems   Review of systems positive for bilateral knee pain, tinnitus, facial rash, bilateral distal LE tingling.     Constitutional:   Fevers or night sweats?  NO      Eyes:   Vision problems?  NO (pt wears glasses, but no changes in vision)           Hearing:  Do you feel you have hearing loss?  NO  Cardiovascular:  Chest pain, palpitations, or pain with walking?  NO   Respiratory:   Breathing problems or cough?  NO    :   Difficulty controlling urination?  NO    Musculoskeletal:   Stiffness or sore joints?  YES - bilateral knees        Skin:   concerning lesions or moles?  NO      Nervous System:   loss of strength or sensation,  numbness or tingling,   tremor,  dizziness,  headache?  YES- tingling in bilateral legs distal to knees        Mental Health:   depression or anxiety,  sleep problems?  NO   Cognition:  Memory problems?  NO       Weight Loss: Have you lost 10 or more pounds unintentionally in the previous year?  NO    PHQ-2 Score:   PHQ-2 ( 1999 Pfizer) 4/9/2018 1/20/2017   Q1: Little interest or pleasure in doing things 0 0   Q2: Feeling down, depressed or hopeless 0 0   PHQ-2 Score 0 0       PHQ-9 Score:   No flowsheet data found.         Medical Care     What other specialists or organizations are involved in your medical care?    Patient Care Team       Relationship Specialty Notifications Start End    Martin Walton MD PCP - [facility]  7/31/13     Phone: [phone] Fax: [phone]         [address] [address]               Social History / Home Safety     Social History   Substance Use Topics     Smoking status: Never Smoker     Smokeless tobacco: Never Used     Alcohol use No     Marital Status:   Who lives in your household?  and patient  Does your home have any of the following safety concerns? Loose rugs in the hallway, no grab bars in the bathroom, no handrails on the stairs or have poorly lit areas?  No   Do you feel threatened or controlled by a partner, ex-partner or anyone in your life?No   Has anyone hurt you physically, for example by pushing, hitting, slapping or kicking you   or forcing you to have sex? No          Functional Status     Do you need help with dressing yourself, bathing, or walking?No   Do you need help with the phone, transportation, shopping, preparing meals, housework, laundry, medications or managing money?No   By yourself and not using aids, do you have any difficulty walking up 10 steps  without resting? No   By yourself and not using aids, do you have any difficulty walking several hundred yards? No                Risk Behaviors and Healthy Habits      History   Smoking Status     Never Smoker   Smokeless Tobacco     Never Used     How many servings of fruits and vegetables do you eat a day? 5  Exercise: Yes  Do you frequently drive without a seatbelt? No, no longer driving but always wears seat belt when passenger  History   Smoking Status     Never Smoker   Smokeless Tobacco     Never Used     Do you use any other drugs? No       Do you use alcohol?No    Functional Ability     Was the patient's timed Up & Go test (Get up from chair walk, 10 feet turn, return to chair and sit down) unsteady or longer than 30 seconds? No     Fall risk:     1. Have you fallen two or more times in the past year? No   2. Have you fallen and had an injury in the past year?  No     EVALUATION OF COGNITIVE FUNCTION     Family member/caregiver input: not obtain as patient arrived to clinic alone, but cognitive function intact on interview     Other Assessments     Advance Directives: Discussed with patient and family as appropriate.  Has patient completed advance directives and/or a living will?  YES  Patient expressed desire to be DNR. Patient is open to having short term intubation or feeding tube placement if necessary.     Immunization History   Administered Date(s) Administered     Influenza (H1N1) 12/02/2009, 10/07/2014     Influenza (High Dose) 3 valent vaccine 10/19/2005, 10/10/2008, 09/09/2010, 09/10/2015, 10/03/2016, 10/12/2017     Influenza (IIV3) PF 11/03/2004, 10/02/2006, 10/01/2007, 09/01/2009, 09/27/2011, 09/25/2012, 09/27/2013     Pneumo Conj 13-V (2010&after) 10/14/1994, 10/02/2006, 12/04/2006     Pneumococcal 23 valent 10/14/2013     TD (ADULT, 7+) 10/30/1997, 10/27/2005     Tdap (Adacel,Boostrix) 07/31/2012     Zoster vaccine, live 11/29/2007     Reviewed Immunization Record Today         Physical Exam     Vitals: /83  Pulse 75  Temp 97.9  F (36.6  C) (Oral)  Wt 131 lb (59.4 kg)  SpO2 96%  Breastfeeding? No  BMI 24.51 kg/m2  BMI= Body mass index is  24.51 kg/(m^2).  GENERAL APPEARANCE: alert and no distress  HENT: ear canals patent and TM's normal; mouth without ulcers or lesions; and oral mucous membranes moist  Hearing loss screen: Normal   DENTITION:  Good repair?  yes  RESP: lungs clear to auscultation - no rales, rhonchi or wheezes  CV: regular rates and rhythm, no murmur, click or rub, trace peripheral edema and peripheral pulses strong  SKIN: small perioral patches above upper lip and at right corner of mouth with slight scale, no other rashes noted  NEURO: Normal strength and tone, alert and oriented  MENTAL STATUS EXAM  Appearance: appropriate  Attitude: cooperative  Behavior: normal  Eye Contact: normal  Speech: normal  Orientation: oreinted to person , place, time and situation  Mood:  good  Affect: Mood Congruent  Thought Process: clear        Assessment and Plan   Brooke Blandon is an 85-year-old female with a history of HTN, HLD, moderate-persistent asthma, hypothyroidism, and idiopathic peripheral neuropathy presenting for annual wellness visit, medication refill, perioral rash.    1. Perioral dermatitis  - begin metroNIDAZOLE (METROGEL) 0.75 % topical gel two times daily  - avoid make up or other products that may be causing irritaiton   - follow up with dermatology (Dr. Gabriel) if not improved in 1 month     2. Benign essential hypertension  HTN well controlled with /  - continue potassium chloride (K-TAB,KLOR-CON) 10 MEQ tablet; Take 1 tablet (10 mEq) by mouth daily  - continue HCTZ 25 mg daily   - continue ASA 81 mg daily    3. Hypothyroidism due to acquired atrophy of thyroid  - TSH draw today, will follow up result   - continue levothyroxine     4. Moderate-Persistent Asthma   ACT 25/25. No bothersome asthma symptoms reported. Asthma care provided by [facility]. PFTs within the last year were satisfactory.   - continue current medications     5. Idiopathic Peripheral Neuropathy  Patient reports that her  neuropathy is stable. She is following with Dr. Quick at [facility]. No longer taking gabapentin due to leg swelling.     This note was scribed by Lane Roblero, MS3 for Dr. Martin Walton.     Options for treatment and follow-up care were reviewed with patient. She participated in the decision making process and verbalized understanding of the options discussed and agreed with the final plan.    Martin Walton MD

## 2018-04-10 DIAGNOSIS — I10 BENIGN ESSENTIAL HYPERTENSION: ICD-10-CM

## 2018-04-10 DIAGNOSIS — L71.0 PERIORAL DERMATITIS: ICD-10-CM

## 2018-04-10 RX ORDER — METRONIDAZOLE 7.5 MG/G
GEL TOPICAL 2 TIMES DAILY
Qty: 45 G | Refills: 11 | Status: SHIPPED | OUTPATIENT
Start: 2018-04-10 | End: 2019-01-11

## 2018-04-10 ASSESSMENT — ASTHMA QUESTIONNAIRES: ACT_TOTALSCORE: 25

## 2018-04-10 NOTE — TELEPHONE ENCOUNTER
[facility] phone call message- patient requesting a refill:    Full Medication Name: potassium chloride (K-TAB,KLOR-CON) 10 MEQ/Ointment for rash Patient states the [facility] called her stating there is something wrong with the order sent on 4/9/18.      Pharmacy confirmed as   [facility] \[facility] [place] [facility]"" - [place], [place] - [address]  [facility] [address]  Phone: [phone] Fax: [phone]    [facility] - [place], [place] - [address] [address]  Phone: [phone] Fax: [phone]  : Yes    Additional Comments: Please call: [phone] [facility]    OK to leave a message on voice mail? Yes    Primary language: English      needed? No    Call taken on April 10, 2018 at 9:54 AM by Lisa Trevino

## 2018-04-10 NOTE — TELEPHONE ENCOUNTER
Request for medication refill:An Ointment for rash (metroNIDAZOLE (METROGEL) 0.75 %) Patient states the [facility] called her stating there is something wrong with the order sent on 4/9/18.    Date of last visit at clinic: 4-9-18    Please complete refill if appropriate and CLOSE ENCOUNTER.    Closing the encounter signifies the refill is complete.    If refill has been denied, please complete the smart phrase .smirefuse and route it to the [facility] RN TRIAGE pool to inform the patient and the pharmacy.    Marge Wiggins, CMA

## 2018-04-12 RX ORDER — POTASSIUM CHLORIDE 750 MG/1
10 TABLET, EXTENDED RELEASE ORAL DAILY
Qty: 90 TABLET | Refills: 3 | Status: SHIPPED | OUTPATIENT
Start: 2018-04-12 | End: 2019-01-29

## 2018-04-12 NOTE — TELEPHONE ENCOUNTER
Medication was requested to be sent to [facility], but prescription was sent to the [facility] in error. Patient is requesting clinic to call [facility] at [phone] to speak with a pharmacist , and for prescription to be resent to the [facility] as they are not able to do pharmacy to pharmacy transfer. Patient requested medication 8 days ago, and is out.

## 2018-10-26 ENCOUNTER — OFFICE VISIT (OUTPATIENT)
Dept: FAMILY MEDICINE | Facility: CLINIC | Age: 83
End: 2018-10-26
Payer: COMMERCIAL

## 2018-10-26 VITALS
SYSTOLIC BLOOD PRESSURE: 150 MMHG | WEIGHT: 129 LBS | HEART RATE: 70 BPM | OXYGEN SATURATION: 100 % | TEMPERATURE: 98.5 F | BODY MASS INDEX: 24.14 KG/M2 | DIASTOLIC BLOOD PRESSURE: 75 MMHG

## 2018-10-26 DIAGNOSIS — I10 ESSENTIAL HYPERTENSION: Primary | ICD-10-CM

## 2018-10-26 DIAGNOSIS — Z53.21 PATIENT LEFT WITHOUT BEING SEEN: ICD-10-CM

## 2018-10-26 LAB
BUN SERPL-MCNC: 14.5 MG/DL (ref 7–19)
CALCIUM SERPL-MCNC: 9.1 MG/DL (ref 8.5–10.1)
CHLORIDE SERPLBLD-SCNC: 97.7 MMOL/L (ref 98–110)
CHOLEST SERPL-MCNC: 167.7 MG/DL (ref 0–200)
CHOLEST/HDLC SERPL: 3.4 {RATIO} (ref 0–5)
CO2 SERPL-SCNC: 30.4 MMOL/L (ref 20–32)
CREAT SERPL-MCNC: 0.8 MG/DL (ref 0.5–1)
GFR SERPL CREATININE-BSD FRML MDRD: 72.5 ML/MIN/1.7 M2
GLUCOSE SERPL-MCNC: 96.3 MG'DL (ref 70–99)
HDLC SERPL-MCNC: 48.7 MG/DL
LDLC SERPL CALC-MCNC: 97 MG/DL (ref 0–129)
POTASSIUM SERPL-SCNC: 3.4 MMOL/DL (ref 3.3–4.5)
SODIUM SERPL-SCNC: 133.8 MMOL/L (ref 132.6–141.4)
TRIGL SERPL-MCNC: 108.5 MG/DL (ref 0–150)
VLDL CHOLESTEROL: 21.7 MG/DL (ref 7–32)

## 2018-10-26 NOTE — MR AVS SNAPSHOT
After Visit Summary   10/26/2018    Brooke Xie    MRN: [id number]           Patient Information     Date Of Birth          1/28/1933        Visit Information        Provider Department      10/26/2018 9:20 AM Martin Walton MD [facility]        Today's Diagnoses     Essential hypertension    -  1    Patient left without being seen           Follow-ups after your visit        Your next 10 appointments already scheduled     Nov 09, 2018  9:20 AM CST   Return Visit with [facility] ([facility])    [address],  [address]  [place] [address]   [phone]              Who to contact     Please call your clinic at [phone] to:    Ask questions about your health    Make or cancel appointments    Discuss your medicines    Learn about your test results    Speak to your doctor            Additional Information About Your Visit        Care EveryWhere ID     This is your Care EveryWhere ID. This could be used by other organizations to access your [place] medical records  [id number]        Your Vitals Were     Pulse Temperature Pulse Oximetry BMI (Body Mass Index)          70 98.5  F (36.9  C) (Oral) 100% 24.14 kg/m2         Blood Pressure from Last 3 Encounters:   10/26/18 150/75   04/09/18 130/83   02/03/17 155/76    Weight from Last 3 Encounters:   10/26/18 129 lb (58.5 kg)   04/09/18 131 lb (59.4 kg)   01/20/17 132 lb (59.9 kg)              We Performed the Following     Basic Metabolic Panel ([facility])     Lipid [place] ([facility])        Primary Care Provider Office Phone # Fax #    Martin Walton [phone]       [address] [address]        Equal Access to Services     ANN-MARIE WALSH : kristina Keita qaybta kaalmada adeegyada, waxay idiin hayaan adeeg kharash la'aan ah. So [facility] [phone].    ATENCIÓN: Si viktoria chirinos romero  disposición servicios gratuitos de asistencia lingüística. Myron lowe [phone].    We comply with applicable federal civil rights laws and [place] laws. We do not discriminate on the basis of race, color, national origin, age, disability, sex, sexual orientation, or gender identity.            Thank you!     Thank you for choosing [facility]  for your care. Our goal is always to provide you with excellent care. Hearing back from our patients is one way we can continue to improve our services. Please take a few minutes to complete the written survey that you may receive in the mail after your visit with us. Thank you!             Your Updated Medication List - Protect others around you: Learn how to safely use, store and throw away your medicines at www.disposemymeds.org.          This list is accurate as of 10/26/18  3:31 PM.  Always use your most recent med list.                   Brand Name Dispense Instructions for use Diagnosis    albuterol 108 (90 Base) MCG/ACT inhaler    PROAIR HFA/PROVENTIL HFA/VENTOLIN HFA    3 Inhaler    Inhale 2 puffs into the lungs every 6 hours as needed for shortness of breath / dyspnea or wheezing    Moderate persistent asthma       ASPIRIN LOW DOSE 81 MG tablet   Generic drug:  aspirin      Take  by mouth daily.        CALTRATE 600 PLUS-VIT D OR      Take 1 tablet by mouth daily.        co-enzyme Q-10 30 MG Caps capsule      Take 30 mg by mouth daily.        fluticasone-salmeterol 100-50 MCG/DOSE diskus inhaler    ADVAIR DISKUS    60 Inhaler    Inhale 1 puff into the lungs every 12 hours    Unspecified asthma(493.90)       hydrochlorothiazide 25 MG tablet    HYDRODIURIL    30 tablet    Take 1 tablet (25 mg) by mouth daily    Essential hypertension, benign       levothyroxine 50 MCG tablet    SYNTHROID/LEVOTHROID    90 tablet    Take 1 tablet (50 mcg) by mouth daily    Hypothyroidism due to acquired atrophy of thyroid       metroNIDAZOLE 0.75 % topical gel     METROGEL    45 g    Apply topically 2 times daily    Perioral dermatitis       montelukast 10 MG tablet    SINGULAIR    90 tablet    Take 1 tablet (10 mg) by mouth daily    Chronic allergic rhinitis due to pollen, unspecified seasonality       MULTIVITAMIN PO      Take  by mouth daily.        OPTIVAR 0.05 % ophthalmic solution   Generic drug:  azelastine      Apply 1 drop to eye 2 times daily. As directed        order for DME     1 Units    Equipment being ordered: Cane    Balance problems       potassium chloride 10 MEQ tablet    K-TAB,KLOR-CON    90 tablet    Take 1 tablet (10 mEq) by mouth daily    Benign essential hypertension       SIMPLY SALINE 0.9 % Aers   Generic drug:  saline      Spray  in nostril. prn        simvastatin 20 MG tablet    ZOCOR    90 tablet    Take 1 tablet (20 mg) by mouth At Bedtime    Mixed hyperlipidemia

## 2018-11-09 ENCOUNTER — OFFICE VISIT (OUTPATIENT)
Dept: FAMILY MEDICINE | Facility: CLINIC | Age: 83
End: 2018-11-09
Payer: COMMERCIAL

## 2018-11-09 VITALS
WEIGHT: 126 LBS | BODY MASS INDEX: 23.58 KG/M2 | TEMPERATURE: 97.9 F | RESPIRATION RATE: 16 BRPM | HEART RATE: 80 BPM | SYSTOLIC BLOOD PRESSURE: 165 MMHG | DIASTOLIC BLOOD PRESSURE: 74 MMHG | OXYGEN SATURATION: 96 %

## 2018-11-09 DIAGNOSIS — R29.6 FALLS FREQUENTLY: Primary | ICD-10-CM

## 2018-11-09 DIAGNOSIS — M22.2X1 PATELLOFEMORAL PAIN SYNDROME OF BOTH KNEES: ICD-10-CM

## 2018-11-09 DIAGNOSIS — M22.2X2 PATELLOFEMORAL PAIN SYNDROME OF BOTH KNEES: ICD-10-CM

## 2018-11-09 ASSESSMENT — ENCOUNTER SYMPTOMS
SHORTNESS OF BREATH: 0
POLYDIPSIA: 0
DYSURIA: 0
FATIGUE: 0
SLEEP DISTURBANCE: 0
VOMITING: 0
BLOOD IN STOOL: 0
CHEST TIGHTNESS: 0
PALPITATIONS: 0
DIARRHEA: 0
EYES NEGATIVE: 1
FEVER: 0
COLOR CHANGE: 0
ABDOMINAL DISTENTION: 0
MYALGIAS: 0
ARTHRALGIAS: 0
DYSPHORIC MOOD: 0
NERVOUS/ANXIOUS: 0
CONSTIPATION: 0
ABDOMINAL PAIN: 0
DIZZINESS: 1
DIFFICULTY URINATING: 0
COUGH: 0
NUMBNESS: 0

## 2018-11-09 NOTE — PROGRESS NOTES
"       HPI       Brooke Xie is a 85 year old  who presents for   Chief Complaint   Patient presents with     Referral     would like a referral to [facility] in [place]. Three falls in the past 2 weeks.      Follow Up For     knee pain has increased left side worse. Grinding sound per pt when she is going up and down stairs.          Imbalance   Onset: since 2007 now is worse   What brings it on? constant    Description:   Do you feel like you are going to faint?:  No   Does it feel like the surroundings (bed, room) are moving?: No   Have you felt unsteady/off balance?:  YES commonly  Have you   fallen?:  YES fallen -no passing out , has fallen 3 times in the last 2 weeks, putting things away -leaned the wrong way on a 3 legged seat, needed help to get up, no injuries     Intensity: moderate    Progression of Symptoms:  worsening    Accompanying Signs & Symptoms:  Heart palpitations:No   Nausea, vomiting:No   Weakness in arms or legs:  YES has neuropathy  Fatigue: No   Vision or speech changes: No   Ringing in ears (Tinnitus):No   Hearing Loss: No     History:   Head trauma/concussion hx:No   Previous similar symptoms:  YES since 2007  Recent bleeding history: No     Precipitating factors:   Worse with activity or head movement?: No   Any new medications (BP?):No   Alcohol/drug abuse/withdrawal: No     Alleviating factors:   Does staying in a fixed position give relief?:no    Therapies Tried and outcome: Nothing       +++++++     Arthritis Follow Up,  Diagnosis:osteoarthritis    Update since last visit: worse-\"sound of bone on bone.  Current Treatment for joint pain: none     Acetaminophen?:no   Exercise/PT? no    Description:   Location(s): both knees  Character: not much pain   Morning Stiffness?:no    Intensity: mild    Joint swelling No     Joint redness  No     Worsened by    Overuse?: no    Alleviating factors:  Improved by: nothing  Any medication monitoring needed? No "     Therapies Tried and outcome: Nothing        Adherence and Exercise  Medication side effects: no  How often is a medication missed? About 1-3 times per week  Exercise:walking  1 day/week for an average of less than 15 minutes     Problem, Medication and Allergy Lists were reviewed and updated if needed..    Patient is an established patient of this clinic..         Review of Systems:   Review of Systems   Constitutional: Negative for fatigue and fever.   HENT: Negative.    Eyes: Negative.  Negative for visual disturbance.   Respiratory: Negative for cough, chest tightness and shortness of breath.    Cardiovascular: Negative for chest pain and palpitations.   Gastrointestinal: Negative for abdominal distention, abdominal pain, blood in stool, constipation, diarrhea and vomiting.   Endocrine: Negative for polydipsia and polyuria.   Genitourinary: Negative for difficulty urinating and dysuria.   Musculoskeletal: Negative for arthralgias and myalgias.   Skin: Negative for color change and rash.   Neurological: Positive for dizziness ( Unsteadiness). Negative for numbness.   Psychiatric/Behavioral: Negative for dysphoric mood and sleep disturbance. The patient is not nervous/anxious.             Physical Exam:     Vitals:    11/09/18 0926 11/09/18 0927   BP: 164/81 165/74   Pulse: 80    Resp: 16    Temp: 97.9  F (36.6  C)    TempSrc: Oral    SpO2: 96%    Weight: 126 lb (57.2 kg)      Body mass index is 23.58 kg/(m^2).  Vitals were reviewed and were normal  Vital signs normal except elevated blood pressure     Physical Exam   Constitutional: She is oriented to person, place, and time. She appears well-developed. No distress.   HENT:   Head: Normocephalic.   Eyes: Conjunctivae are normal. No scleral icterus.   Neck: Normal range of motion. No thyromegaly present.   Cardiovascular: Normal rate, regular rhythm and normal heart sounds.    No murmur heard.  Pulmonary/Chest: Effort normal and breath sounds normal. No  respiratory distress. She has no wheezes.   Abdominal: Soft. Bowel sounds are normal. She exhibits no distension. There is no splenomegaly or hepatomegaly. There is no tenderness.   Musculoskeletal: She exhibits no edema.   Lymphadenopathy:     She has no cervical adenopathy.   Neurological: She is alert and oriented to person, place, and time. No cranial nerve deficit or sensory deficit. Coordination and gait abnormal.   Has significantly wide-based gait and reports that she cannot bring her legs close together she is not using a cane at this point.  She also has significant weakness in her quads and in her hip flexors.   Skin: Skin is warm and dry. She is not diaphoretic.   Psychiatric: She has a normal mood and affect. Her behavior is normal. Judgment and thought content normal.   Vitals reviewed.        Results:      Results from this visit  Results for orders placed or performed in visit on 10/26/18   Basic Metabolic Panel ([facility])   Result Value Ref Range    Urea Nitrogen 14.5 7.0 - 19.0 mg/dL    Calcium 9.1 8.5 - 10.1 mg/dL    Chloride 97.7 (L) 98.0 - 110.0 mmol/L    Carbon Dioxide 30.4 20.0 - 32.0 mmol/L    Creatinine 0.8 0.5 - 1.0 mg/dL    Glucose 96.3 70.0 - 99.0 mg'dL    Potassium 3.4 3.3 - 4.5 mmol/dL    Sodium 133.8 132.6 - 141.4 mmol/L    GFR Estimate 72.5 >60.0 mL/min/1.7 m2    GFR Estimate If Black 87.7 >60.0 mL/min/1.7 m2   Lipid [place] ([facility])   Result Value Ref Range    Cholesterol 167.7 0.0 - 200.0 mg/dL    Cholesterol/HDL Ratio 3.4 0.0 - 5.0    HDL Cholesterol 48.7 >40.0 mg/dL    LDL Cholesterol Calculated 97 0 - 129 mg/dL    Triglycerides 108.5 0.0 - 150.0 mg/dL    VLDL Cholesterol 21.7 7.0 - 32.0 mg/dL       Assessment and Plan            1. Falls frequently  Referral to PT  - PHYSICAL THERAPY REFERRAL; Future    2. Patellofemoral pain syndrome of both knees  Exercises below  - PHYSICAL THERAPY REFERRAL; Future      Please call or return to clinic if your symptoms don't go  away.    Follow up plan  As needed           There are no discontinued medications.    Options for treatment and follow-up care were reviewed with the patient. Brooke Xie  engaged in the decision making process and verbalized understanding of the options discussed and agreed with the final plan.    Martin Walton MD

## 2018-11-09 NOTE — PATIENT INSTRUCTIONS
Here is the plan from today's visit    1. Falls frequently  Referral to PT  - PHYSICAL THERAPY REFERRAL; Future    2. Patellofemoral pain syndrome of both knees  Exercises below  - PHYSICAL THERAPY REFERRAL; Future      Please call or return to clinic if your symptoms don't go away.    Follow up plan  As needed    Exercises for Patellofemoral Syndrome  Ice your knee before and after the exercises  1. The Clam Shell  Hold it open for 10 seconds,  repeat 10 times in the AM and 10 in the PM      2 The VMO Exercise  Sit on a hard surface, grab your less painful leg, sit up straight and raise the other leg off the floor just a few inches-don't lean back. Hold it up for 10 sec then relax  -repeat 10 times in the AM and 10 times in the PM     3 The Plank  Hold your body straight as a board for 10 sec then -repeat 10 times in the AM and 10 times in the       Thank you for coming to [facility] today.  Lab Testing:  **If you had lab testing today and your results are reassuring or normal they will be mailed to you or sent through [employer] within 7 days.   **If the lab tests need quick action we will call you with the results.  The phone number we will call with results is # [phone] (home) . If this is not the best number please call our clinic and change the number.  Medication Refills:  If you need any refills please call your pharmacy and they will contact us.   If you need to  your refill at a new pharmacy, please contact the new pharmacy directly. The new pharmacy will help you get your medications transferred faster.   Scheduling:  If you have any concerns about today's visit or wish to schedule another appointment please call our office during normal business hours [phone] (8-5:00 M-F)  If a referral was made to a [facility] and you don't get a call from central scheduling please call [phone].  If a Mammogram was ordered for you at [facility] call [phone] to  schedule or change your appointment.  If you had an XRay/CT/Ultrasound/MRI ordered the number is [phone] to schedule or change your radiology appointment.   Medical Concerns:  If you have urgent medical concerns please call [phone] at any time of the day.    Martin Walton MD

## 2018-11-10 ASSESSMENT — ASTHMA QUESTIONNAIRES: ACT_TOTALSCORE: 25

## 2018-12-17 DIAGNOSIS — I10 ESSENTIAL HYPERTENSION, BENIGN: ICD-10-CM

## 2018-12-17 RX ORDER — HYDROCHLOROTHIAZIDE 25 MG/1
25 TABLET ORAL DAILY
Qty: 30 TABLET | Refills: 0 | Status: SHIPPED | OUTPATIENT
Start: 2018-12-17 | End: 2018-12-20

## 2018-12-17 NOTE — TELEPHONE ENCOUNTER
Verify that the refill encounter hasn't been started Yes    [facility] phone call message- patient requesting a refill:    Full Medication Name: hydrochlorothiazide (HYDRODIURIL) 25 MG tablet    Dose: Take 1 tablet (25 mg) by mouth daily - Oral     Pharmacy confirmed as :[facility] - [place], [place] - [address] Yes    Medication tab checked to see if medication has been sent  Yes    Additional Comments: Patient is out of medication.     OK to leave a message on voice mail? Yes    Advised patient refill may take up to 2 business days? Yes    Primary language: English      needed? No    Call taken on December 17, 2018 at 8:28 AM by Kim Oneil    Route to [facility] MED REFILL

## 2018-12-20 ENCOUNTER — TELEPHONE (OUTPATIENT)
Dept: FAMILY MEDICINE | Facility: CLINIC | Age: 83
End: 2018-12-20

## 2018-12-20 DIAGNOSIS — I10 ESSENTIAL HYPERTENSION, BENIGN: ICD-10-CM

## 2018-12-20 RX ORDER — HYDROCHLOROTHIAZIDE 25 MG/1
25 TABLET ORAL DAILY
Qty: 30 TABLET | Refills: 0 | Status: SHIPPED | OUTPATIENT
Start: 2018-12-20 | End: 2019-01-16

## 2018-12-20 NOTE — TELEPHONE ENCOUNTER
[facility] phone call message- medication clarification/question:    Full Medication Name: hydrochlorothiazide (HYDRODIURIL) 25 MG tablet      Dose:     Question/Clarification needed: Patient is out of medication and stated that it was sent to the wrong location      [facility]    [address]  [facility] [address]  Pharmacy confirmed as     [phone]  Fax [phone]    : Yes    Please leave ONLY preferred pharmacy    OK to leave a message on voice mail? Yes    Advised patient that RN would call back within 3 hours, unless emergent.    Primary language: English      needed? No    Call taken on December 20, 2018 at 2:18 PM by Edna Lee to [facility]

## 2018-12-20 NOTE — TELEPHONE ENCOUNTER
RN corrected the order and sent it to [facility]. RN notified patient and told her that we would like to see her for a visit before filling it further, since her past BP's in clinic were out of range. She declined being transferred to schedule, but agreed to call later for an appointment.  Hannah Cage RN

## 2018-12-21 ENCOUNTER — TRANSFERRED RECORDS (OUTPATIENT)
Dept: HEALTH INFORMATION MANAGEMENT | Facility: CLINIC | Age: 83
End: 2018-12-21

## 2019-01-10 NOTE — PROGRESS NOTES
"Brooke is a 85 year old  who presents for   Patient presents with:  Irregular Heart Beat  Head Injury: fall 1/9/18      Assessment and Plan          1. Moderate persistent asthma without complication  Continue current asthma medications, well controlled.    2. Essential hypertension, benign goal <150  Continue hydrochlorothiazide, patient's goal for high blood pressure is less than 150/90 she appears to make these goals in the community though when we test her with her machine she is still high will continue to monitor her at this point it seems that increasing her medications would unlikely to be helpful to her.    3. Falls frequently  Continue exercises and PT at [facility], encouraged her to keep doing exercises to use a cane which she declined to do and to follow-up at the [facility] or we can refer her to and he does look another physical therapy signed form exercise training.    4. Laceration of scalp, initial encounter  Advised her to use triple antibiotics for several days also to return to clinic if redness pain or discharge comes from the wound.  Advised her that the wound was too old to suture though if bleeding restarted or other problem we would reevaluate      Please call or return to clinic if your symptoms don't go away.    Follow up plan  Please make a clinic appointment for follow up with me (MARTIN WALTON) in 5  months for labs.         There are no discontinued medications.    Options for treatment and follow-up care were reviewed with the patient. Brooke E Xie  engaged in the decision making process and verbalized understanding of the options discussed and agreed with the final plan.    Martin Walton MD         HPI       Brooke is a 85 year old  who presents for   Patient presents with:  Irregular Heart Beat  Head Injury: fall 1/9/18    Exam at [facility]   Informed she had an\"irregular heart beat  Going to PT at [facility]  Fall  On 1/9 while putting on coat " fell and hit head on door jam, bled considerably, no LOS, not dizzy since then, head does hurt. Did bleed about a cup.    Hypertension Follow-up  < 150/90    Outpatient blood pressures are being checked at [facility].  Results are 140/82 and 122/90.    Chest Pain? :No     Low Salt Diet: no added salt    Daily NSAID Use?No     Did patient take their HTN pills today/last night as usual?  Yes    Last Basic Metabolic Panel:  Lab Results   Component Value Date    .8 10/26/2018      Lab Results   Component Value Date    POTASSIUM 3.4 10/26/2018     Lab Results   Component Value Date    CHLORIDE 97.7 10/26/2018     Lab Results   Component Value Date    KACEY 9.1 10/26/2018     Lab Results   Component Value Date    CO2 30.4 10/26/2018     Lab Results   Component Value Date    BUN 14.5 10/26/2018     Lab Results   Component Value Date    CR 0.8 10/26/2018     Lab Results   Component Value Date    GLC 96.3 10/26/2018       Adherence and Exercise  Medication side effects: no  How often is a medication missed? Never  Exercise:walking         Problem, Medication and Allergy Lists were reviewed and updated if needed..    Patient is an established patient of this clinic..    Health Maintenance  Health Maintenance Due   Topic Date Due     ZOSTER IMMUNIZATION (2 of 3) 01/24/2008     ASTHMA CONTROL TEST Q3 MOS  02/09/2019            Review of Systems:   Review of Systems   Constitutional: Negative for fatigue and fever.   HENT: Negative.         Head pain and injury on left side of scalp   Eyes: Negative.  Negative for visual disturbance.   Respiratory: Negative for cough, chest tightness and shortness of breath.    Cardiovascular: Negative for chest pain and palpitations.   Gastrointestinal: Negative for abdominal distention, abdominal pain, blood in stool, constipation, diarrhea and vomiting.   Endocrine: Negative for polydipsia and polyuria.   Genitourinary: Negative for difficulty urinating and dysuria.   Musculoskeletal:  Negative for arthralgias and myalgias.   Skin: Negative for color change and rash.   Neurological: Positive for dizziness ( Unsteadiness). Negative for numbness.   Psychiatric/Behavioral: Negative for dysphoric mood and sleep disturbance. The patient is not nervous/anxious.             Physical Exam:     Vitals:    01/11/19 0910   BP: 155/75   Pulse: 80   Temp: 97.7  F (36.5  C)   SpO2: 99%   Weight: 59 kg (130 lb)     Body mass index is 24.32 kg/m .     Vital signs normal except elevated BP though still at goal.     Physical Exam   Constitutional: She is oriented to person, place, and time. She appears well-developed. No distress.   HENT:   Head: Normocephalic.       Eyes: Conjunctivae are normal. No scleral icterus.   Neck: Normal range of motion. No thyromegaly present.   Cardiovascular: Normal rate, regular rhythm and normal heart sounds.   No murmur heard.  Pulmonary/Chest: Effort normal and breath sounds normal. No respiratory distress. She has no wheezes.   Abdominal: Soft. Bowel sounds are normal. She exhibits no distension. There is no splenomegaly or hepatomegaly. There is no tenderness.   Musculoskeletal: She exhibits no edema.   Lymphadenopathy:     She has no cervical adenopathy.   Neurological: She is alert and oriented to person, place, and time.   Skin: Skin is warm and dry. She is not diaphoretic.   Psychiatric: She has a normal mood and affect. Her behavior is normal. Judgment and thought content normal.   Vitals reviewed.        Results:   No testing ordered today

## 2019-01-11 ENCOUNTER — OFFICE VISIT (OUTPATIENT)
Dept: FAMILY MEDICINE | Facility: CLINIC | Age: 84
End: 2019-01-11
Payer: COMMERCIAL

## 2019-01-11 VITALS
DIASTOLIC BLOOD PRESSURE: 75 MMHG | BODY MASS INDEX: 24.32 KG/M2 | TEMPERATURE: 97.7 F | SYSTOLIC BLOOD PRESSURE: 155 MMHG | OXYGEN SATURATION: 99 % | HEART RATE: 80 BPM | WEIGHT: 130 LBS

## 2019-01-11 DIAGNOSIS — R29.6 FALLS FREQUENTLY: ICD-10-CM

## 2019-01-11 DIAGNOSIS — J45.40 MODERATE PERSISTENT ASTHMA WITHOUT COMPLICATION: Primary | ICD-10-CM

## 2019-01-11 DIAGNOSIS — I10 ESSENTIAL HYPERTENSION, BENIGN: ICD-10-CM

## 2019-01-11 DIAGNOSIS — S01.01XA LACERATION OF SCALP, INITIAL ENCOUNTER: ICD-10-CM

## 2019-01-11 ASSESSMENT — ENCOUNTER SYMPTOMS
DYSURIA: 0
COLOR CHANGE: 0
POLYDIPSIA: 0
CHEST TIGHTNESS: 0
COUGH: 0
FATIGUE: 0
DIZZINESS: 1
ARTHRALGIAS: 0
EYES NEGATIVE: 1
DIFFICULTY URINATING: 0
SHORTNESS OF BREATH: 0
MYALGIAS: 0
ABDOMINAL PAIN: 0
VOMITING: 0
DYSPHORIC MOOD: 0
BLOOD IN STOOL: 0
NERVOUS/ANXIOUS: 0
ABDOMINAL DISTENTION: 0
PALPITATIONS: 0
DIARRHEA: 0
FEVER: 0
CONSTIPATION: 0
NUMBNESS: 0
SLEEP DISTURBANCE: 0

## 2019-01-11 NOTE — PATIENT INSTRUCTIONS
Here is the plan from today's visit    1. Moderate persistent asthma without complication  Continue current asthma medications    2. Essential hypertension, benign goal <150  Continue HCTZ    3. Falls frequently  Continue exercises and PT at [facility]    4. Laceration of scalp, initial encounter  Use triple antibiotics      Please call or return to clinic if your symptoms don't go away.    Follow up plan  Please make a clinic appointment for follow up with me (MARTIN WALTON) in 5  months for labs.    Thank you for coming to [facility] today.  Lab Testing:  **If you had lab testing today and your results are reassuring or normal they will be mailed to you or sent through [facility] within 7 days.   **If the lab tests need quick action we will call you with the results.  The phone number we will call with results is # [phone] (home) . If this is not the best number please call our clinic and change the number.  Medication Refills:  If you need any refills please call your pharmacy and they will contact us.   If you need to  your refill at a new pharmacy, please contact the new pharmacy directly. The new pharmacy will help you get your medications transferred faster.   Scheduling:  If you have any concerns about today's visit or wish to schedule another appointment please call our office during normal business hours [phone] (8-5:00 M-F)  If a referral was made to a [facility] and you don't get a call from central scheduling please call [phone].  If a Mammogram was ordered for you at [facility] call [phone] to schedule or change your appointment.  If you had an XRay/CT/Ultrasound/MRI ordered the number is [phone] to schedule or change your radiology appointment.   Medical Concerns:  If you have urgent medical concerns please call [phone] at any time of the day.    Martin Walton MD

## 2019-01-12 ASSESSMENT — ASTHMA QUESTIONNAIRES: ACT_TOTALSCORE: 25

## 2019-01-16 DIAGNOSIS — I10 ESSENTIAL HYPERTENSION, BENIGN: ICD-10-CM

## 2019-01-16 RX ORDER — HYDROCHLOROTHIAZIDE 25 MG/1
25 TABLET ORAL DAILY
Qty: 30 TABLET | Refills: 0 | Status: SHIPPED | OUTPATIENT
Start: 2019-01-16 | End: 2019-01-25

## 2019-01-16 NOTE — TELEPHONE ENCOUNTER
"Request for medication refill:    Patient would like a 90 day supply. Was refilled on 12/20/2018    Providers if patient needs an appointment and you are willing to give a one month supply please refill for one month and  send a letter/MyChart using \".SMILLIMITEDREFILL\" .smillimited and route chart to \"P SMI \" (Giving one month refill in non controlled medications is strongly recommended before denial)    If refill has been denied, meaning absolutely no refills without visit, please complete the smart phrase \".smirxrefuse\" and route it to the \"P [facility] MED REFILLS\"  pool to inform the patient and the pharmacy.    Trista Lucas CMA        "

## 2019-01-16 NOTE — TELEPHONE ENCOUNTER
Verify that the refill encounter hasn't been started Yes    [facility] phone call message- patient requesting a refill:    Full Medication Name: hydrochlorothiazide (HYDRODIURIL) 25 MG tablet    Dose:      Pharmacy confirmed as   [facility]   [phone]  [address]  [facility] [address]    : Yes    Medication tab checked to see if medication has been sent  Yes    Additional Comments: requesting 90 day supply      OK to leave a message on voice mail? Yes    Advised patient refill may take up to 2 business days? Yes    Primary language: English      needed? No    Call taken on January 16, 2019 at 9:52 AM by Edna Lee to [facility] MED REFILL

## 2019-01-25 DIAGNOSIS — I10 ESSENTIAL HYPERTENSION, BENIGN: ICD-10-CM

## 2019-01-25 RX ORDER — HYDROCHLOROTHIAZIDE 25 MG/1
25 TABLET ORAL DAILY
Qty: 90 TABLET | Refills: 3 | Status: SHIPPED | OUTPATIENT
Start: 2019-01-25 | End: 2020-03-30

## 2019-01-29 ENCOUNTER — TELEPHONE (OUTPATIENT)
Dept: FAMILY MEDICINE | Facility: CLINIC | Age: 84
End: 2019-01-29

## 2019-01-29 DIAGNOSIS — I10 BENIGN ESSENTIAL HYPERTENSION: ICD-10-CM

## 2019-01-29 RX ORDER — POTASSIUM CHLORIDE 750 MG/1
10 TABLET, EXTENDED RELEASE ORAL DAILY
Qty: 90 TABLET | Refills: 3 | Status: SHIPPED | OUTPATIENT
Start: 2019-01-29 | End: 2019-04-25

## 2019-01-29 NOTE — TELEPHONE ENCOUNTER
[facility] phone call message- patient requesting a refill:    Full Medication Name: potassium chloride (K-TAB,KLOR-CON) 10 MEQ tablet    Dose: Take 1 tablet (10 mEq) by mouth daily - Oral    Pharmacy confirmed as   [facility]  [address]  [facility] [address]  Phone: [phone] Fax: [phone]    : Yes    Additional Comments: Patient has 8 tablets left, and mail service takes about 7 days to deliver.      OK to leave a message on voice mail? Yes    Primary language: English      needed? No    Call taken on January 29, 2019 at 11:34 AM by Emma Davis

## 2019-02-12 DIAGNOSIS — E78.2 MIXED HYPERLIPIDEMIA: ICD-10-CM

## 2019-02-12 NOTE — TELEPHONE ENCOUNTER
[facility] phone call message- patient requesting a refill:    Full Medication Name: simvastatin (ZOCOR) 20 MG tablet    Dose: Take 1 tablet (20 mg) by mouth At Bedtime - Oral    Pharmacy confirmed as   [facility] - [place], [place] - [address] AT PORTAL TO REGISTERED [facility] SITES  : Yes    Additional Comments: Patient said fax is [phone]    OK to leave a message on voice mail? Yes    Primary language: English      needed? No    Call taken on February 12, 2019 at 4:11 PM by Carri Izaguirre

## 2019-02-14 RX ORDER — SIMVASTATIN 20 MG
20 TABLET ORAL AT BEDTIME
Qty: 90 TABLET | Refills: 3 | Status: SHIPPED | OUTPATIENT
Start: 2019-02-14 | End: 2020-04-01

## 2019-03-01 ENCOUNTER — TELEPHONE (OUTPATIENT)
Dept: FAMILY MEDICINE | Facility: CLINIC | Age: 84
End: 2019-03-01

## 2019-03-01 DIAGNOSIS — E03.4 HYPOTHYROIDISM DUE TO ACQUIRED ATROPHY OF THYROID: ICD-10-CM

## 2019-03-01 RX ORDER — LEVOTHYROXINE SODIUM 50 UG/1
50 TABLET ORAL DAILY
Qty: 90 TABLET | Refills: 3 | Status: SHIPPED | OUTPATIENT
Start: 2019-03-01 | End: 2019-03-04

## 2019-03-01 NOTE — TELEPHONE ENCOUNTER
Verify that the refill encounter hasn't been started Yes    [facility] phone call message- patient requesting a refill:    Full Medication Name: levothyroxine (SYNTHROID/LEVOTHROID) 50 MCG tablet    Dose: Take 1 tablet (50 mcg) by mouth daily - Oral     Pharmacy confirmed as     [facility] - [place], [place] - [address]   [address] [facility] [address]  Phone: [phone] Fax: [phone]  : Yes    Medication tab checked to see if medication has been sent  Yes    Additional Comments: Patient is out of med noted above, took last table today; thought she had more. Mail order med takes 15 days to arrive.     OK to leave a message on voice mail? Yes    Advised patient refill may take up to 2 business days? No    Primary language: English      needed? No    Call taken on March 1, 2019 at 8:09 AM by Jeanette Chavez    Route to [facility] MED REFILL

## 2019-03-01 NOTE — TELEPHONE ENCOUNTER
RN called patient to inquire if there is a pharmacy near her that we could send a 30 day supply to so that she is not out. She has no transportation and needs a 90 day supply sent to mail order ASAP.    RN routing to PCP so that 90 days can be sent, RN can only fill 30 days per protocol.  Hannah Cage RN

## 2019-03-04 RX ORDER — LEVOTHYROXINE SODIUM 50 UG/1
50 TABLET ORAL DAILY
Qty: 90 TABLET | Refills: 3 | Status: SHIPPED | OUTPATIENT
Start: 2019-03-04 | End: 2019-03-12

## 2019-03-04 NOTE — TELEPHONE ENCOUNTER
Patient states that she need her medication to be sent to the [facility] mail service not to [facility]. Please call patient to clarify or for any further questions.

## 2019-03-04 NOTE — TELEPHONE ENCOUNTER
RN spoke with the pharmacy who stated nothing is needed from us, they need patient to call them so they can verify information with her and send it out. RN informed patient and she will call them.  Hannah Cage RN

## 2019-03-04 NOTE — TELEPHONE ENCOUNTER
RN sent it to [facility] for patient and called her and apologized. The wrong pharmacy was in the intake call.  Hannah Cage RN

## 2019-03-04 NOTE — TELEPHONE ENCOUNTER
Patient calling to inform RN and Dr. Walton that pharmacy has questions regarding medication and needs to be called. Please advise.

## 2019-03-05 ENCOUNTER — DOCUMENTATION ONLY (OUTPATIENT)
Dept: FAMILY MEDICINE | Facility: CLINIC | Age: 84
End: 2019-03-05

## 2019-03-05 NOTE — PROGRESS NOTES
"When opening a documentation only encounter, be sure to enter in \"Chief Complaint\" Forms and in \" Comments\" Title of form, description if needed.    Brooke is a 86 year old  female  Form received via: Fax  Form now resides in: Provider Ready    Eric Linraes CMA                "

## 2019-03-12 DIAGNOSIS — E03.4 HYPOTHYROIDISM DUE TO ACQUIRED ATROPHY OF THYROID: ICD-10-CM

## 2019-03-12 RX ORDER — LEVOTHYROXINE SODIUM 50 UG/1
50 TABLET ORAL DAILY
Qty: 90 TABLET | Refills: 3 | Status: SHIPPED | OUTPATIENT
Start: 2019-03-12 | End: 2020-01-29

## 2019-03-12 NOTE — TELEPHONE ENCOUNTER

## 2019-03-25 ENCOUNTER — TELEPHONE (OUTPATIENT)
Dept: FAMILY MEDICINE | Facility: CLINIC | Age: 84
End: 2019-03-25

## 2019-03-25 NOTE — TELEPHONE ENCOUNTER
[facility] phone call message- general phone call:    Reason for call: Patient requesting montelukast (SINGULAIR) 10 MG tablet to be taken off current med list. She is no longer taking medication after seeing Allergist.     Return call needed: no    OK to leave a message on voice mail? Yes    Primary language: English      needed? No    Call taken on March 25, 2019 at 10:37 AM by Emma Yang

## 2019-04-25 DIAGNOSIS — I10 BENIGN ESSENTIAL HYPERTENSION: ICD-10-CM

## 2019-04-25 RX ORDER — POTASSIUM CHLORIDE 750 MG/1
10 TABLET, EXTENDED RELEASE ORAL DAILY
Qty: 90 TABLET | Refills: 2 | Status: SHIPPED | OUTPATIENT
Start: 2019-04-25 | End: 2020-01-15

## 2019-04-25 NOTE — TELEPHONE ENCOUNTER
"Request for medication refill:  Klor con was initially filled through [facility] mail - request for change to [facility] mail order.  Pt has 3 refills left    Providers if patient needs an appointment and you are willing to give a one month supply please refill for one month and  send a letter/MyChart using \".SMILLIMITEDREFILL\" .smillimited and route chart to \"P SMI \" (Giving one month refill in non controlled medications is strongly recommended before denial)    If refill has been denied, meaning absolutely no refills without visit, please complete the smart phrase \".smirxrefuse\" and route it to the \"[facility] MED REFILLS\"  pool to inform the patient and the pharmacy.    Edie Ontiveros [facility]        "

## 2019-11-14 NOTE — MR AVS SNAPSHOT
After Visit Summary   11/9/2018    Brooke Xie    MRN: [id number]           Patient Information     Date Of Birth          1/28/1933        Visit Information        Provider Department      11/9/2018 9:20 AM Martin Walton MD [facility]        Today's Diagnoses     Falls frequently    -  1    Patellofemoral pain syndrome of both knees          Care Instructions    Here is the plan from today's visit    1. Falls frequently  Referral to PT  - PHYSICAL THERAPY REFERRAL; Future    2. Patellofemoral pain syndrome of both knees  Exercises below  - PHYSICAL THERAPY REFERRAL; Future      Please call or return to clinic if your symptoms don't go away.    Follow up plan  As needed    Exercises for Patellofemoral Syndrome  Ice your knee before and after the exercises  1. The Clam Shell  Hold it open for 10 seconds,  repeat 10 times in the AM and 10 in the PM      2 The VMO Exercise  Sit on a hard surface, grab your less painful leg, sit up straight and raise the other leg off the floor just a few inches-don't lean back. Hold it up for 10 sec then relax  -repeat 10 times in the AM and 10 times in the PM     3 The Plank  Hold your body straight as a board for 10 sec then -repeat 10 times in the AM and 10 times in the       Thank you for coming to [facility] today.  Lab Testing:  **If you had lab testing today and your results are reassuring or normal they will be mailed to you or sent through [facility] within 7 days.   **If the lab tests need quick action we will call you with the results.  The phone number we will call with results is # [phone] (home) . If this is not the best number please call our clinic and change the number.  Medication Refills:  If you need any refills please call your pharmacy and they will contact us.   If you need to  your refill at a new pharmacy, please contact the new pharmacy directly. The new pharmacy will help you get your medications  transferred faster.   Scheduling:  If you have any concerns about today's visit or wish to schedule another appointment please call our office during normal business hours [phone] (8-5:00 M-F)  If a referral was made to a [facility] and you don't get a call from central scheduling please call [phone].  If a Mammogram was ordered for you at [facility] call [phone] to schedule or change your appointment.  If you had an XRay/CT/Ultrasound/MRI ordered the number is [phone] to schedule or change your radiology appointment.   Medical Concerns:  If you have urgent medical concerns please call [phone] at any time of the day.    Martin Walton MD            Follow-ups after your visit        Additional Services     PHYSICAL THERAPY REFERRAL       [facility]  [address]  [place], [address]  [phone]                  Future tests that were ordered for you today     Open Future Orders        Priority Expected Expires Ordered    PHYSICAL THERAPY REFERRAL Routine  11/9/2019 11/9/2018            Who to contact     Please call your clinic at [phone] to:    Ask questions about your health    Make or cancel appointments    Discuss your medicines    Learn about your test results    Speak to your doctor            Additional Information About Your Visit        Care EveryWhere ID     This is your Care EveryWhere ID. This could be used by other organizations to access your [place] medical records  [id number]        Your Vitals Were     Pulse Temperature Respirations Pulse Oximetry BMI (Body Mass Index)       80 97.9  F (36.6  C) (Oral) 16 96% 23.58 kg/m2        Blood Pressure from Last 3 Encounters:   11/09/18 165/74   10/26/18 150/75   04/09/18 130/83    Weight from Last 3 Encounters:   11/09/18 126 lb (57.2 kg)   10/26/18 129 lb (58.5 kg)   04/09/18 131 lb (59.4 kg)                 Today's Medication Changes          These changes are  accurate as of 11/9/18 10:12 AM.  If you have any questions, ask your nurse or doctor.               Stop taking these medicines if you haven't already. Please contact your care team if you have questions.     ASPIRIN LOW DOSE 81 MG tablet   Generic drug:  aspirin   Stopped by:  Martin Walton MD                    Primary Care Provider Office Phone # Fax #    Martin Walton [phone]       [address] [address]        Equal Access to Services     [facility]: Hadii aad ku hadasho Soomaali, waaxda luqadaha, qaybta kaalmada adeegyada, waxay idiin hayaan adeeg kharash lamaria e . So [facility] [phone].    ATENCIÓN: Si habla español, tiene a romero disposición servicios gratuitos de asistencia lingüística. [facility] [phone].    We comply with applicable federal civil rights laws and [place] laws. We do not discriminate on the basis of race, color, national origin, age, disability, sex, sexual orientation, or gender identity.            Thank you!     Thank you for choosing [facility]  for your care. Our goal is always to provide you with excellent care. Hearing back from our patients is one way we can continue to improve our services. Please take a few minutes to complete the written survey that you may receive in the mail after your visit with us. Thank you!             Your Updated Medication List - Protect others around you: Learn how to safely use, store and throw away your medicines at www.disposemymeds.org.          This list is accurate as of 11/9/18 10:12 AM.  Always use your most recent med list.                   Brand Name Dispense Instructions for use Diagnosis    albuterol 108 (90 Base) MCG/ACT inhaler    PROAIR HFA/PROVENTIL HFA/VENTOLIN HFA    3 Inhaler    Inhale 2 puffs into the lungs every 6 hours as needed for shortness of breath / dyspnea or wheezing    Moderate persistent asthma       CALTRATE 600 PLUS-VIT D OR      Take 1 tablet  by mouth daily.        co-enzyme Q-10 30 MG Caps capsule      Take 30 mg by mouth daily.        fluticasone-salmeterol 100-50 MCG/DOSE diskus inhaler    ADVAIR DISKUS    60 Inhaler    Inhale 1 puff into the lungs every 12 hours    Unspecified asthma(493.90)       hydrochlorothiazide 25 MG tablet    HYDRODIURIL    30 tablet    Take 1 tablet (25 mg) by mouth daily    Essential hypertension, benign       levothyroxine 50 MCG tablet    SYNTHROID/LEVOTHROID    90 tablet    Take 1 tablet (50 mcg) by mouth daily    Hypothyroidism due to acquired atrophy of thyroid       metroNIDAZOLE 0.75 % topical gel    METROGEL    45 g    Apply topically 2 times daily    Perioral dermatitis       montelukast 10 MG tablet    SINGULAIR    90 tablet    Take 1 tablet (10 mg) by mouth daily    Chronic allergic rhinitis due to pollen, unspecified seasonality       MULTIVITAMIN PO      Take  by mouth daily.        OPTIVAR 0.05 % ophthalmic solution   Generic drug:  azelastine      Apply 1 drop to eye 2 times daily. As directed        order for DME     1 Units    Equipment being ordered: Cane    Balance problems       potassium chloride 10 MEQ tablet    K-TAB,KLOR-CON    90 tablet    Take 1 tablet (10 mEq) by mouth daily    Benign essential hypertension       SIMPLY SALINE 0.9 % Aers   Generic drug:  saline      Spray  in nostril. prn        simvastatin 20 MG tablet    ZOCOR    90 tablet    Take 1 tablet (20 mg) by mouth At Bedtime    Mixed hyperlipidemia          Consent 1/Introductory Paragraph: The rationale for Mohs was explained to the patient and consent was obtained. The risks, benefits and alternatives to therapy were discussed in detail. Specifically, the risks of infection, scarring, pain, bleeding, prolonged wound healing, incomplete removal, allergy to anesthesia, nerve injury, recurrence and the possible need of delayed or additional reconstruction were addressed. Prior to the procedure, the treatment site was clearly identified and confirmed by the patient. All components of Universal Protocol/PAUSE Rule completed.

## 2020-01-15 DIAGNOSIS — I10 BENIGN ESSENTIAL HYPERTENSION: ICD-10-CM

## 2020-01-15 RX ORDER — POTASSIUM CHLORIDE 750 MG/1
10 TABLET, EXTENDED RELEASE ORAL DAILY
Qty: 90 TABLET | Refills: 2 | Status: SHIPPED | OUTPATIENT
Start: 2020-01-15 | End: 2020-03-16

## 2020-01-15 NOTE — TELEPHONE ENCOUNTER
"Request for medication refill: potassium chloride ER (K-TAB/KLOR-CON) 10 MEQ CR tablet    Providers if patient needs an appointment and you are willing to give a one month supply please refill for one month and  send a letter/MyChart using \".SMILLIMITEDREFILL\" .smillimited and route chart to \"P [facility] \" (Giving one month refill in non controlled medications is strongly recommended before denial)    If refill has been denied, meaning absolutely no refills without visit, please complete the smart phrase \".smirxrefuse\" and route it to the \"P [facility] MED REFILLS\"  pool to inform the patient and the pharmacy.    Judy Vargas CMA        "

## 2020-01-23 ENCOUNTER — TRANSFERRED RECORDS (OUTPATIENT)
Dept: HEALTH INFORMATION MANAGEMENT | Facility: CLINIC | Age: 85
End: 2020-01-23

## 2020-01-28 DIAGNOSIS — E03.4 HYPOTHYROIDISM DUE TO ACQUIRED ATROPHY OF THYROID: ICD-10-CM

## 2020-01-28 NOTE — TELEPHONE ENCOUNTER
Verify that the refill encounter hasn't been started Yes    [facility] phone call message- patient requesting a refill:    Full Medication Name: levothyroxine (SYNTHROID/LEVOTHROID) 50 MCG tablet    Dose:      Pharmacy confirmed as   [facility]  [address]  [facility] [address]  : No: [facility]    Medication tab checked to see if medication has been sent  Yes    Additional Comments: NONE     OK to leave a message on voice mail? Yes    Advised patient refill may take up to 2 business days? Yes    Primary language: English      needed? No    Call taken on January 28, 2020 at 3:59 PM by Edna Lee to [facility] MED REFILL

## 2020-01-28 NOTE — TELEPHONE ENCOUNTER
"Request for medication refill: levothyroxine (SYNTHROID/LEVOTHROID) 50 MCG tablet    Providers if patient needs an appointment and you are willing to give a one month supply please refill for one month and  send a letter/MyChart using \".SMILLIMITEDREFILL\" .smillimited and route chart to \"P [facility] \" (Giving one month refill in non controlled medications is strongly recommended before denial)    If refill has been denied, meaning absolutely no refills without visit, please complete the smart phrase \".smirxrefuse\" and route it to the \"P [facility] MED REFILLS\"  pool to inform the patient and the pharmacy.    Lydia Siddiqi CMA        "

## 2020-01-29 ENCOUNTER — OFFICE VISIT - HEALTHEAST (OUTPATIENT)
Dept: GERIATRICS | Facility: CLINIC | Age: 85
End: 2020-01-29

## 2020-01-29 ENCOUNTER — AMBULATORY - HEALTHEAST (OUTPATIENT)
Dept: ADMINISTRATIVE | Facility: CLINIC | Age: 85
End: 2020-01-29

## 2020-01-29 DIAGNOSIS — E78.5 HYPERLIPIDEMIA, UNSPECIFIED HYPERLIPIDEMIA TYPE: ICD-10-CM

## 2020-01-29 DIAGNOSIS — K44.9 DIAPHRAGMATIC HERNIA WITHOUT OBSTRUCTION AND WITHOUT GANGRENE: ICD-10-CM

## 2020-01-29 DIAGNOSIS — E03.9 HYPOTHYROIDISM, UNSPECIFIED TYPE: ICD-10-CM

## 2020-01-29 DIAGNOSIS — G60.9 HEREDITARY AND IDIOPATHIC PERIPHERAL NEUROPATHY: ICD-10-CM

## 2020-01-29 DIAGNOSIS — R29.898 WEAKNESS OF BOTH LOWER EXTREMITIES: ICD-10-CM

## 2020-01-29 DIAGNOSIS — I10 ESSENTIAL HYPERTENSION: ICD-10-CM

## 2020-01-29 DIAGNOSIS — J30.9 ALLERGIC RHINITIS, UNSPECIFIED SEASONALITY, UNSPECIFIED TRIGGER: ICD-10-CM

## 2020-01-29 DIAGNOSIS — J45.909 ASTHMA, UNSPECIFIED ASTHMA SEVERITY, UNSPECIFIED WHETHER COMPLICATED, UNSPECIFIED WHETHER PERSISTENT: ICD-10-CM

## 2020-01-29 RX ORDER — LEVOTHYROXINE SODIUM 50 UG/1
50 TABLET ORAL DAILY
Qty: 90 TABLET | Refills: 3 | Status: SHIPPED | OUTPATIENT
Start: 2020-01-29 | End: 2020-07-03

## 2020-01-29 ASSESSMENT — MIFFLIN-ST. JEOR: SCORE: 906.26

## 2020-01-31 ENCOUNTER — OFFICE VISIT - HEALTHEAST (OUTPATIENT)
Dept: GERIATRICS | Facility: CLINIC | Age: 85
End: 2020-01-31

## 2020-01-31 ENCOUNTER — RECORDS - HEALTHEAST (OUTPATIENT)
Dept: LAB | Facility: CLINIC | Age: 85
End: 2020-01-31

## 2020-01-31 ENCOUNTER — TRANSFERRED RECORDS (OUTPATIENT)
Dept: HEALTH INFORMATION MANAGEMENT | Facility: CLINIC | Age: 85
End: 2020-01-31

## 2020-01-31 DIAGNOSIS — N18.30 CKD (CHRONIC KIDNEY DISEASE) STAGE 3, GFR 30-59 ML/MIN (H): ICD-10-CM

## 2020-01-31 DIAGNOSIS — N30.90 CYSTITIS: ICD-10-CM

## 2020-01-31 DIAGNOSIS — E03.9 HYPOTHYROIDISM, UNSPECIFIED TYPE: ICD-10-CM

## 2020-01-31 DIAGNOSIS — I10 ESSENTIAL HYPERTENSION: ICD-10-CM

## 2020-01-31 DIAGNOSIS — J45.909 ASTHMA, UNSPECIFIED ASTHMA SEVERITY, UNSPECIFIED WHETHER COMPLICATED, UNSPECIFIED WHETHER PERSISTENT: ICD-10-CM

## 2020-01-31 DIAGNOSIS — G60.9 HEREDITARY AND IDIOPATHIC PERIPHERAL NEUROPATHY: ICD-10-CM

## 2020-01-31 DIAGNOSIS — R29.898 WEAKNESS OF BOTH LOWER EXTREMITIES: ICD-10-CM

## 2020-01-31 LAB
ANION GAP SERPL CALCULATED.3IONS-SCNC: 11 MMOL/L (ref 5–18)
BUN SERPL-MCNC: 26 MG/DL (ref 8–28)
CALCIUM SERPL-MCNC: 9.7 MG/DL (ref 8.5–10.5)
CHLORIDE BLD-SCNC: 103 MMOL/L (ref 98–107)
CO2 SERPL-SCNC: 26 MMOL/L (ref 22–31)
CREAT SERPL-MCNC: 1.09 MG/DL (ref 0.6–1.1)
GFR SERPL CREATININE-BSD FRML MDRD: 47 ML/MIN/1.73M2
GLUCOSE BLD-MCNC: 69 MG/DL (ref 70–125)
POTASSIUM BLD-SCNC: 3.9 MMOL/L (ref 3.5–5)
SODIUM SERPL-SCNC: 140 MMOL/L (ref 136–145)

## 2020-02-03 ENCOUNTER — OFFICE VISIT - HEALTHEAST (OUTPATIENT)
Dept: GERIATRICS | Facility: CLINIC | Age: 85
End: 2020-02-03

## 2020-02-03 ENCOUNTER — TRANSFERRED RECORDS (OUTPATIENT)
Dept: HEALTH INFORMATION MANAGEMENT | Facility: CLINIC | Age: 85
End: 2020-02-03

## 2020-02-03 DIAGNOSIS — J30.9 ALLERGIC RHINITIS, UNSPECIFIED SEASONALITY, UNSPECIFIED TRIGGER: ICD-10-CM

## 2020-02-03 DIAGNOSIS — G60.9 HEREDITARY AND IDIOPATHIC PERIPHERAL NEUROPATHY: ICD-10-CM

## 2020-02-03 DIAGNOSIS — N18.30 CRI (CHRONIC RENAL INSUFFICIENCY), STAGE 3 (MODERATE) (H): ICD-10-CM

## 2020-02-03 DIAGNOSIS — K44.9 DIAPHRAGMATIC HERNIA WITHOUT OBSTRUCTION AND WITHOUT GANGRENE: ICD-10-CM

## 2020-02-03 DIAGNOSIS — E03.9 HYPOTHYROIDISM, UNSPECIFIED TYPE: ICD-10-CM

## 2020-02-03 DIAGNOSIS — E78.5 HYPERLIPIDEMIA, UNSPECIFIED HYPERLIPIDEMIA TYPE: ICD-10-CM

## 2020-02-03 DIAGNOSIS — R29.898 WEAKNESS OF BOTH LOWER EXTREMITIES: ICD-10-CM

## 2020-02-03 DIAGNOSIS — I10 ESSENTIAL HYPERTENSION: ICD-10-CM

## 2020-02-03 DIAGNOSIS — J45.909 ASTHMA, UNSPECIFIED ASTHMA SEVERITY, UNSPECIFIED WHETHER COMPLICATED, UNSPECIFIED WHETHER PERSISTENT: ICD-10-CM

## 2020-02-03 ASSESSMENT — MIFFLIN-ST. JEOR: SCORE: 906.26

## 2020-02-07 ENCOUNTER — TRANSFERRED RECORDS (OUTPATIENT)
Dept: HEALTH INFORMATION MANAGEMENT | Facility: CLINIC | Age: 85
End: 2020-02-07

## 2020-02-07 ENCOUNTER — OFFICE VISIT - HEALTHEAST (OUTPATIENT)
Dept: GERIATRICS | Facility: CLINIC | Age: 85
End: 2020-02-07

## 2020-02-07 DIAGNOSIS — R29.898 WEAKNESS OF EXTREMITY: ICD-10-CM

## 2020-02-07 DIAGNOSIS — I10 ESSENTIAL HYPERTENSION: ICD-10-CM

## 2020-02-07 DIAGNOSIS — G60.9 HEREDITARY AND IDIOPATHIC PERIPHERAL NEUROPATHY: ICD-10-CM

## 2020-02-07 DIAGNOSIS — N18.30 CRI (CHRONIC RENAL INSUFFICIENCY), STAGE 3 (MODERATE) (H): ICD-10-CM

## 2020-02-10 ENCOUNTER — OFFICE VISIT - HEALTHEAST (OUTPATIENT)
Dept: GERIATRICS | Facility: CLINIC | Age: 85
End: 2020-02-10

## 2020-02-10 DIAGNOSIS — K44.9 DIAPHRAGMATIC HERNIA WITHOUT OBSTRUCTION AND WITHOUT GANGRENE: ICD-10-CM

## 2020-02-10 DIAGNOSIS — E03.9 HYPOTHYROIDISM, UNSPECIFIED TYPE: ICD-10-CM

## 2020-02-10 DIAGNOSIS — G60.9 HEREDITARY AND IDIOPATHIC PERIPHERAL NEUROPATHY: ICD-10-CM

## 2020-02-10 DIAGNOSIS — N18.30 CRI (CHRONIC RENAL INSUFFICIENCY), STAGE 3 (MODERATE) (H): ICD-10-CM

## 2020-02-10 DIAGNOSIS — E78.5 HYPERLIPIDEMIA, UNSPECIFIED HYPERLIPIDEMIA TYPE: ICD-10-CM

## 2020-02-10 DIAGNOSIS — J30.9 ALLERGIC RHINITIS, UNSPECIFIED SEASONALITY, UNSPECIFIED TRIGGER: ICD-10-CM

## 2020-02-10 DIAGNOSIS — I10 ESSENTIAL HYPERTENSION: ICD-10-CM

## 2020-02-10 DIAGNOSIS — J45.909 ASTHMA, UNSPECIFIED ASTHMA SEVERITY, UNSPECIFIED WHETHER COMPLICATED, UNSPECIFIED WHETHER PERSISTENT: ICD-10-CM

## 2020-02-10 DIAGNOSIS — R29.898 WEAKNESS OF BOTH LOWER EXTREMITIES: ICD-10-CM

## 2020-02-10 ASSESSMENT — MIFFLIN-ST. JEOR: SCORE: 906.26

## 2020-02-12 ENCOUNTER — OFFICE VISIT - HEALTHEAST (OUTPATIENT)
Dept: GERIATRICS | Facility: CLINIC | Age: 85
End: 2020-02-12

## 2020-02-12 DIAGNOSIS — E78.5 HYPERLIPIDEMIA, UNSPECIFIED HYPERLIPIDEMIA TYPE: ICD-10-CM

## 2020-02-12 DIAGNOSIS — R29.898 WEAKNESS OF BOTH LOWER EXTREMITIES: ICD-10-CM

## 2020-02-12 DIAGNOSIS — K44.9 DIAPHRAGMATIC HERNIA WITHOUT OBSTRUCTION AND WITHOUT GANGRENE: ICD-10-CM

## 2020-02-12 DIAGNOSIS — I10 ESSENTIAL HYPERTENSION: ICD-10-CM

## 2020-02-12 DIAGNOSIS — J45.909 ASTHMA, UNSPECIFIED ASTHMA SEVERITY, UNSPECIFIED WHETHER COMPLICATED, UNSPECIFIED WHETHER PERSISTENT: ICD-10-CM

## 2020-02-12 DIAGNOSIS — J30.9 ALLERGIC RHINITIS, UNSPECIFIED SEASONALITY, UNSPECIFIED TRIGGER: ICD-10-CM

## 2020-02-12 DIAGNOSIS — N18.30 CRI (CHRONIC RENAL INSUFFICIENCY), STAGE 3 (MODERATE) (H): ICD-10-CM

## 2020-02-12 DIAGNOSIS — E03.9 HYPOTHYROIDISM, UNSPECIFIED TYPE: ICD-10-CM

## 2020-02-12 DIAGNOSIS — G60.9 HEREDITARY AND IDIOPATHIC PERIPHERAL NEUROPATHY: ICD-10-CM

## 2020-02-12 ASSESSMENT — MIFFLIN-ST. JEOR: SCORE: 906.26

## 2020-02-14 ENCOUNTER — AMBULATORY - HEALTHEAST (OUTPATIENT)
Dept: GERIATRICS | Facility: CLINIC | Age: 85
End: 2020-02-14

## 2020-02-27 ENCOUNTER — OFFICE VISIT (OUTPATIENT)
Dept: FAMILY MEDICINE | Facility: CLINIC | Age: 85
End: 2020-02-27
Payer: COMMERCIAL

## 2020-02-27 VITALS
SYSTOLIC BLOOD PRESSURE: 145 MMHG | HEIGHT: 59 IN | BODY MASS INDEX: 24.19 KG/M2 | DIASTOLIC BLOOD PRESSURE: 80 MMHG | OXYGEN SATURATION: 97 % | HEART RATE: 84 BPM | WEIGHT: 120 LBS

## 2020-02-27 DIAGNOSIS — Z13.9 SCREENING FOR CONDITION: ICD-10-CM

## 2020-02-27 DIAGNOSIS — E78.2 MIXED HYPERLIPIDEMIA: ICD-10-CM

## 2020-02-27 DIAGNOSIS — I10 ESSENTIAL HYPERTENSION, BENIGN: ICD-10-CM

## 2020-02-27 DIAGNOSIS — E03.4 HYPOTHYROIDISM DUE TO ACQUIRED ATROPHY OF THYROID: Primary | ICD-10-CM

## 2020-02-27 LAB
BILIRUBIN UR: NEGATIVE MG/DL
BLOOD UR: NEGATIVE MG/DL
BUN SERPL-MCNC: 15.1 MG/DL (ref 7–19)
CALCIUM SERPL-MCNC: 9.2 MG/DL (ref 8.5–10.1)
CHLORIDE SERPLBLD-SCNC: 98.3 MMOL/L (ref 98–110)
CHOLEST SERPL-MCNC: 196.4 MG/DL (ref 0–200)
CHOLEST/HDLC SERPL: 4 {RATIO} (ref 0–5)
CO2 SERPL-SCNC: 29.1 MMOL/L (ref 20–32)
CREAT SERPL-MCNC: 0.9 MG/DL (ref 0.5–1)
GFR SERPL CREATININE-BSD FRML MDRD: 61.9 ML/MIN/1.7 M2
GLUCOSE SERPL-MCNC: 94.3 MG'DL (ref 70–99)
GLUCOSE URINE: NEGATIVE
HDLC SERPL-MCNC: 49.6 MG/DL
KETONES UR QL: NEGATIVE MG/DL
LDLC SERPL CALC-MCNC: 111 MG/DL (ref 0–129)
LEUKOCYTE ESTERASE UR: ABNORMAL
NITRITE UR QL STRIP: NEGATIVE MG/DL
PH UR STRIP: 6.5 [PH] (ref 4.5–8)
POTASSIUM SERPL-SCNC: 3.6 MMOL/L (ref 3.3–4.5)
PROTEIN UR: NEGATIVE MG/DL
SODIUM SERPL-SCNC: 136 MMOL/L (ref 132.6–141.4)
SP GR UR STRIP: 1.02 (ref 1–1.03)
TRIGL SERPL-MCNC: 180 MG/DL (ref 0–150)
TSH SERPL DL<=0.005 MIU/L-ACNC: 1.29 MU/L (ref 0.4–4)
UROBILINOGEN UR STRIP-ACNC: ABNORMAL E.U./DL
VLDL CHOLESTEROL: 36 MG/DL (ref 7–32)

## 2020-02-27 ASSESSMENT — MIFFLIN-ST. JEOR: SCORE: 884.95

## 2020-02-27 NOTE — LETTER
February 27, 2020      Brooke Xie  [address]    [facility] [facility] [address]        Dear Brooke,    Thank you for getting your care at [facility]. Please see below for your test results. They are reassuring.    Resulted Orders   TSH with free T4 reflex   Result Value Ref Range    TSH 1.29 0.40 - 4.00 mU/L   Lipid Cascade (\[facility]\"")   Result Value Ref Range    Cholesterol 196.4 0.0 - 200.0 mg/dL    Cholesterol/HDL Ratio 4.0 0.0 - 5.0    HDL Cholesterol 49.6 >40.0 mg/dL    Triglycerides 180.0 (H) 0.0 - 150.0 mg/dL    VLDL Cholesterol 36.0 (H) 7.0 - 32.0 mg/dL    LDL Cholesterol Calculated 111 0 - 129 mg/dL   Basic Metabolic Panel ([facility])   Result Value Ref Range    Calcium 9.2 8.5 - 10.1 mg/dL    Chloride 98.3 98.0 - 110.0 mmol/L    Carbon Dioxide 29.1 20.0 - 32.0 mmol/L    Creatinine 0.9 0.5 - 1.0 mg/dL    Glucose 94.3 70.0 - 99.0 mg'dL    Potassium 3.6 3.3 - 4.5 mmol/L    Sodium 136.0 132.6 - 141.4 mmol/L    GFR Estimate 61.9 >60.0 mL/min/1.7 m2    GFR Estimate If Black 74.9 >60.0 mL/min/1.7 m2    Urea Nitrogen 15.1 7.0 - 19.0 mg/dL   Urinalysis (UA) (\[facility]\"")   Result Value Ref Range    Specific Gravity Urine 1.025 1.005 - 1.030    pH Urine 6.5 4.5 - 8.0    Leukocyte Esterase UR 2+ (A) NEGATIVE    Nitrite Urine Negative NEGATIVE mg/dL    Protein UR Negative NEGATIVE mg/dL    Glucose Urine Negative NEGATIVE    Ketones Urine Negative NEGATIVE mg/dL    Urobilinogen mg/dL 0.2 E.U./dL 0.2 E.U./dL E.U./dL    Bilirubin UR Negative NEGATIVE mg/dL    Blood UR Negative NEGATIVE mg/dL       If you have any concerns about these results please call and leave a message for me or send a MyChart message to the clinic.    Sincerely,    Martin Walton MD

## 2020-02-27 NOTE — PROGRESS NOTES
Brooke is a 87 year old  who presents for   Patient presents with:  Follow Up: F/U on UTI       Assessment and Plan      1. Hypothyroidism due to acquired atrophy of thyroid  TSH is is within normal limits reassured patient.  - TSH with free T4 reflex    2. Essential hypertension, benign goal <150  Hypertension is controlled.  - Basic Metabolic Panel ([facility])    3. Mixed hyperlipidemia  Hyperlipidemia controlled continue current medications  - Lipid [place] ([facility])    4. Screening for condition  UA has 2+ leukocytes though no symptoms so will not treat at this point.  - Urinalysis (UA) ([facility])     No follow-ups on file.    There are no discontinued medications.      Martin Walton MD         [facility]       Brooke is a 87 year old  who presents for   Patient presents with:  Follow Up: F/U on UTI         Visit Cassandra  1. HTN  Well controlled   2. Hospitalization/TCU  Fell on 1/23-felt very week -thought to be because on peripheral neuropathy.    Was in hospital 1/23 -1/28 At [facility] , went to rehab until 2/13 now at home using a walker.  Feeling well. Making do.   3. Had UTI requested Urine analysis  No symptoms of dysuria or urgency.    Problem, Medication and Allergy Lists were reviewed and updated if needed..  Patient is an established patient of this clinic.  Reviewed and updated as needed this visit by clinical staff  Tobacco  Allergies  Meds  Problems  Med Hx  Surg Hx  Fam Hx  Soc Hx        Reviewed and updated as needed this visit by Provider  Tobacco  Allergies  Meds  Problems  Med Hx  Surg Hx  Fam Hx              Review of Systems:   Review of Systems  7 point review of symptoms was otherwise negative except as noted in HPI         Physical Exam:     Vitals:    02/27/20 1028 02/27/20 1031   BP: (!) 161/83 (!) 145/80   BP Location: Left arm Left arm   Patient Position: Sitting Sitting   Cuff Size: Adult Regular Adult Regular   Pulse: 84    SpO2: 97%    Weight: 54.4 kg (120 lb)    Height: 1.499 m  "(4' 11\")      Body mass index is 24.24 kg/m .  Vital signs normal except initially elevated blood pressure though with patient's age on recheck it was in the normal range.     Physical Exam  Vitals signs reviewed.   Constitutional:       General: She is not in acute distress.     Appearance: She is well-developed. She is not diaphoretic.   HENT:      Head: Normocephalic.   Eyes:      General: No scleral icterus.     Conjunctiva/sclera: Conjunctivae normal.   Neck:      Musculoskeletal: Normal range of motion.      Thyroid: No thyromegaly.   Cardiovascular:      Rate and Rhythm: Normal rate and regular rhythm.      Heart sounds: Normal heart sounds. No murmur.   Pulmonary:      Effort: Pulmonary effort is normal. No respiratory distress.      Breath sounds: Normal breath sounds. No wheezing.   Abdominal:      General: Bowel sounds are normal. There is no distension.      Palpations: Abdomen is soft. There is no hepatomegaly or splenomegaly.      Tenderness: There is no abdominal tenderness.   Lymphadenopathy:      Cervical: No cervical adenopathy.   Skin:     General: Skin is warm and dry.   Neurological:      Mental Status: She is alert and oriented to person, place, and time.   Psychiatric:         Behavior: Behavior normal.         Thought Content: Thought content normal.         Judgment: Judgment normal.           Results:      Results from this visit  Results for orders placed or performed in visit on 02/27/20   TSH with free T4 reflex     Status: None   Result Value Ref Range    TSH 1.29 0.40 - 4.00 mU/L   Lipid Cascade ([facility])     Status: Abnormal   Result Value Ref Range    Cholesterol 196.4 0.0 - 200.0 mg/dL    Cholesterol/HDL Ratio 4.0 0.0 - 5.0    HDL Cholesterol 49.6 >40.0 mg/dL    Triglycerides 180.0 (H) 0.0 - 150.0 mg/dL    VLDL Cholesterol 36.0 (H) 7.0 - 32.0 mg/dL    LDL Cholesterol Calculated 111 0 - 129 mg/dL   Basic Metabolic Panel ([facility])     Status: None   Result Value Ref Range    Calcium " 9.2 8.5 - 10.1 mg/dL    Chloride 98.3 98.0 - 110.0 mmol/L    Carbon Dioxide 29.1 20.0 - 32.0 mmol/L    Creatinine 0.9 0.5 - 1.0 mg/dL    Glucose 94.3 70.0 - 99.0 mg'dL    Potassium 3.6 3.3 - 4.5 mmol/L    Sodium 136.0 132.6 - 141.4 mmol/L    GFR Estimate 61.9 >60.0 mL/min/1.7 m2    GFR Estimate If Black 74.9 >60.0 mL/min/1.7 m2    Urea Nitrogen 15.1 7.0 - 19.0 mg/dL   Urinalysis (UA) ([facility])     Status: Abnormal   Result Value Ref Range    Specific Gravity Urine 1.025 1.005 - 1.030    pH Urine 6.5 4.5 - 8.0    Leukocyte Esterase UR 2+ (A) NEGATIVE    Nitrite Urine Negative NEGATIVE mg/dL    Protein UR Negative NEGATIVE mg/dL    Glucose Urine Negative NEGATIVE    Ketones Urine Negative NEGATIVE mg/dL    Urobilinogen mg/dL 0.2 E.U./dL 0.2 E.U./dL E.U./dL    Bilirubin UR Negative NEGATIVE mg/dL    Blood UR Negative NEGATIVE mg/dL       Options for treatment and follow-up care were reviewed with the patient. Brooke Xie  engaged in the decision making process and verbalized understanding of the options discussed and agreed with the final plan.  Martin Walton MD  [facility]

## 2020-03-02 ENCOUNTER — TELEPHONE (OUTPATIENT)
Dept: FAMILY MEDICINE | Facility: CLINIC | Age: 85
End: 2020-03-02

## 2020-03-02 NOTE — TELEPHONE ENCOUNTER
[facility] phone call message- patient requesting results.    Test: Lab    Date of test: 2/27/2020    Additional Comments: Patient requesting call back to discuss results and ways to improve.     Lab tab checked to verify if result comment present with in each order: Yes    Letters tab checked to verify if lab result letter has been entered: Yes    OK to leave a message on voice mail? Yes    Advised patient response may take up to 2 business days: Yes    Primary language: English      needed? No    Call taken on March 2, 2020 at 4:43 PM by Emma Lee to [facility]

## 2020-03-02 NOTE — TELEPHONE ENCOUNTER
Returned phone call to patient, all pertinent questions regarding lab work answered. No critical lab result noted-expect from residual Leukocyte Esterase UR.  Pt reports to be asymptomatic.    Michela Guerrier RN

## 2020-03-16 ENCOUNTER — TELEPHONE (OUTPATIENT)
Dept: FAMILY MEDICINE | Facility: CLINIC | Age: 85
End: 2020-03-16

## 2020-03-16 DIAGNOSIS — I10 BENIGN ESSENTIAL HYPERTENSION: ICD-10-CM

## 2020-03-16 RX ORDER — POTASSIUM CHLORIDE 750 MG/1
10 TABLET, EXTENDED RELEASE ORAL DAILY
Qty: 90 TABLET | Refills: 2 | Status: SHIPPED | OUTPATIENT
Start: 2020-03-16 | End: 2020-12-22

## 2020-03-16 NOTE — TELEPHONE ENCOUNTER
Verify that the refill encounter hasn't been started Yes    [facility] phone call message- patient requesting a refill:    Full Medication Name: potassium chloride ER (K-TAB/KLOR-CON) 10 MEQ CR tablet     Dose: Take 1 tablet (10 mEq) by mouth daily - Oral      [facility]: [phone]  : Yes    Medication tab checked to see if medication has been sent  Yes    Additional Comments: Patient is out of medication      OK to leave a message on voice mail? Yes    Advised patient refill may take up to 2 business days? Yes    Primary language: English      needed? No    Call taken on March 16, 2020 at 3:01 PM by Kim Oneil    Route to [facility] MED REFILL

## 2020-03-16 NOTE — TELEPHONE ENCOUNTER
Pt has refills on file with [facility] mail order. RN gave number to call    Monika Issa RN

## 2020-03-16 NOTE — TELEPHONE ENCOUNTER
Patient calling to advise that [facility] mail order does not accept her insurance; spoke with insurance company who advised that she have prescription sent to [facility], phone number [phone].

## 2020-03-23 ENCOUNTER — TELEPHONE (OUTPATIENT)
Dept: PHARMACY | Facility: PHYSICIAN GROUP | Age: 85
End: 2020-03-23

## 2020-03-23 NOTE — TELEPHONE ENCOUNTER
PHARMACY TELEPHONE ENCOUNTER:    Reason: lab results      I spoke to Brooke today regarding her lab results.  I have reviewed her basic metabolic panel and urine analysis.  I explained that the values normal.    Note forwarded to Dr. Anil Walton.    Terry Lipscomb, Pharm.D.

## 2020-03-27 DIAGNOSIS — I10 ESSENTIAL HYPERTENSION, BENIGN: ICD-10-CM

## 2020-03-30 RX ORDER — HYDROCHLOROTHIAZIDE 25 MG/1
25 TABLET ORAL DAILY
Qty: 90 TABLET | Refills: 3 | Status: SHIPPED | OUTPATIENT
Start: 2020-03-30 | End: 2020-06-30

## 2020-03-30 NOTE — TELEPHONE ENCOUNTER

## 2020-04-01 DIAGNOSIS — E78.2 MIXED HYPERLIPIDEMIA: ICD-10-CM

## 2020-04-01 RX ORDER — SIMVASTATIN 20 MG
20 TABLET ORAL AT BEDTIME
Qty: 90 TABLET | Refills: 3 | Status: SHIPPED | OUTPATIENT
Start: 2020-04-01 | End: 2020-09-09

## 2020-04-01 NOTE — TELEPHONE ENCOUNTER
"Request for medication refill: simvastatin (ZOCOR) 20 MG tablet     Providers if patient needs an appointment and you are willing to give a one month supply please refill for one month and  send a letter/MyChart using \".SMILLIMITEDREFILL\" .smillimited and route chart to \"[facility] \" (Giving one month refill in non controlled medications is strongly recommended before denial)    If refill has been denied, meaning absolutely no refills without visit, please complete the smart phrase \".smirxrefuse\" and route it to the \"[facility] MED REFILLS\"  pool to inform the patient and the pharmacy.    Judy Vargas, [facility]        "

## 2020-04-01 NOTE — TELEPHONE ENCOUNTER
Verify that the refill encounter hasn't been started Yes    [facility] phone call message- patient requesting a refill:    Full Medication Name: simvastatin (ZOCOR) 20 MG tablet    Dose:      Pharmacy confirmed as   [facility] - [place], [place] - [address] [place] [address]  Phone: [phone] Fax: [phone]  : Yes    Medication tab checked to see if medication has been sent  No:     Additional Comments: pt request Rx refill states has only one pills would you please send ASAP if poss thanks    OK to leave a message on voice mail? Yes    Advised patient refill may take up to 2 business days? No: needs asap    Primary language: English      needed? No    Call taken on April 1, 2020 at 4:45 PM by Dominic Lee to [facility] MED REFILL

## 2020-06-26 DIAGNOSIS — I10 ESSENTIAL HYPERTENSION, BENIGN: ICD-10-CM

## 2020-06-30 RX ORDER — HYDROCHLOROTHIAZIDE 25 MG/1
25 TABLET ORAL DAILY
Qty: 90 TABLET | Refills: 3 | Status: SHIPPED | OUTPATIENT
Start: 2020-06-30 | End: 2021-03-29

## 2020-06-30 NOTE — TELEPHONE ENCOUNTER

## 2020-07-03 ENCOUNTER — TELEPHONE (OUTPATIENT)
Dept: FAMILY MEDICINE | Facility: CLINIC | Age: 85
End: 2020-07-03

## 2020-07-03 DIAGNOSIS — E03.4 HYPOTHYROIDISM DUE TO ACQUIRED ATROPHY OF THYROID: ICD-10-CM

## 2020-07-03 RX ORDER — LEVOTHYROXINE SODIUM 50 UG/1
50 TABLET ORAL DAILY
Qty: 90 TABLET | Refills: 0 | Status: SHIPPED | OUTPATIENT
Start: 2020-07-03 | End: 2020-09-10

## 2020-07-03 NOTE — TELEPHONE ENCOUNTER
Verify that the refill encounter hasn't been started Yes    [facility] phone call message- patient requesting a refill:    Full Medication Name: levothyroxine (SYNTHROID/LEVOTHROID) 50 MCG tablet     Dose: see chart     Pharmacy confirmed as   [facility] - [place], [place] - [address] [place] [address]  Phone: [phone] Fax: [phone]  : Yes    Medication tab checked to see if medication has been sent  Yes    Additional Comments: Patient is out of med.     OK to leave a message on voice mail? Yes    Advised patient refill may take up to 2 business days? Yes    Primary language: English      needed? No    Call taken on July 3, 2020 at 8:01 AM by Jeanette Lee to [facility] MED REFILL

## 2020-09-09 ENCOUNTER — TELEPHONE (OUTPATIENT)
Dept: FAMILY MEDICINE | Facility: CLINIC | Age: 85
End: 2020-09-09

## 2020-09-09 DIAGNOSIS — E78.2 MIXED HYPERLIPIDEMIA: ICD-10-CM

## 2020-09-09 RX ORDER — SIMVASTATIN 20 MG
20 TABLET ORAL AT BEDTIME
Qty: 90 TABLET | Refills: 0 | Status: SHIPPED | OUTPATIENT
Start: 2020-09-09 | End: 2021-01-22

## 2020-09-09 NOTE — TELEPHONE ENCOUNTER
Verify that the refill encounter hasn't been started Yes    [facility] phone call message- patient requesting a refill:    Full Medication Name: simvastatin (ZOCOR) 20 MG tablet       Dose:Take 1 tablet (20 mg) by mouth At Bedtime - Oral       Pharmacy confirmed as   [facility] - [place], [place] - [address] [place] [address]  Phone: [phone] Fax: [phone]      : Yes    Medication tab checked to see if medication has been sent  Yes    Additional Comments: Out of medication      OK to leave a message on voice mail? Yes    Advised patient refill may take up to 2 business days? No:     Primary language: English      needed? No    Call taken on September 9, 2020 at 9:41 AM by Елена Nunn    Route to  [facility] MED REFILL

## 2020-09-09 NOTE — TELEPHONE ENCOUNTER
Last office visit: 02/27/2020  Last refill: 04/01/2020 for #90    Medication refilled per standing order.    Alisha Chery RN

## 2020-09-10 DIAGNOSIS — E03.4 HYPOTHYROIDISM DUE TO ACQUIRED ATROPHY OF THYROID: ICD-10-CM

## 2020-09-10 RX ORDER — LEVOTHYROXINE SODIUM 50 UG/1
50 TABLET ORAL DAILY
Qty: 90 TABLET | Refills: 0 | Status: SHIPPED | OUTPATIENT
Start: 2020-09-10 | End: 2020-11-24

## 2020-09-10 NOTE — TELEPHONE ENCOUNTER

## 2020-11-24 ENCOUNTER — TELEPHONE (OUTPATIENT)
Dept: FAMILY MEDICINE | Facility: CLINIC | Age: 85
End: 2020-11-24

## 2020-11-24 DIAGNOSIS — E03.4 HYPOTHYROIDISM DUE TO ACQUIRED ATROPHY OF THYROID: ICD-10-CM

## 2020-11-24 RX ORDER — LEVOTHYROXINE SODIUM 50 UG/1
50 TABLET ORAL DAILY
Qty: 90 TABLET | Refills: 0 | Status: SHIPPED | OUTPATIENT
Start: 2020-11-24 | End: 2021-03-29

## 2020-11-24 NOTE — TELEPHONE ENCOUNTER
Last office visit: 02/27/2020  Last refill: 09/10/2020 for #90    Medication refilled per standing order.    Alisha Chery RN

## 2020-11-24 NOTE — TELEPHONE ENCOUNTER
Verify that the refill encounter hasn't been started Yes    [facility] phone call message- patient requesting a refill:    Full Medication Name: levothyroxine (SYNTHROID/LEVOTHROID) 50 MCG tablet    Dose: Route: Take 1 tablet (50 mcg) by mouth daily - Oral     Pharmacy confirmed as   [facility] - [place], [place] - [address] [place] [address]  Phone: [phone] Fax: [phone]    : Yes    Medication tab checked to see if medication has been sent  Yes    Additional Comments: Patient out of medication     OK to leave a message on voice mail? Yes    Advised patient refill may take up to 2 business days? Yes    Primary language: English      needed? No    Call taken on November 24, 2020 at 11:58 AM by April Thaddeus    Route to [facility] MED REFILL

## 2020-11-30 ENCOUNTER — TELEPHONE (OUTPATIENT)
Dept: FAMILY MEDICINE | Facility: CLINIC | Age: 85
End: 2020-11-30

## 2020-11-30 DIAGNOSIS — I10 ESSENTIAL HYPERTENSION, BENIGN: ICD-10-CM

## 2020-11-30 DIAGNOSIS — E78.2 MIXED HYPERLIPIDEMIA: ICD-10-CM

## 2020-11-30 DIAGNOSIS — E03.4 HYPOTHYROIDISM DUE TO ACQUIRED ATROPHY OF THYROID: Primary | ICD-10-CM

## 2020-11-30 NOTE — TELEPHONE ENCOUNTER
"RN spoke to patient to clarify request. Patient states she gets \"full labs\" 2x a year including her thyroid and kidney function. Patient requesting Dr. Walton place lab orders. Patient is coming with her  for an appointment on 12/28/20 with Dr. Walton and would like to have labs drawn that day. Routing to PCP to place if appropriate.   Tata Bell RN    "

## 2020-11-30 NOTE — TELEPHONE ENCOUNTER
[facility] phone call message - order or referral request from patient:     Order or referral being requested: Requested order Full lab draw     Additional Details: Patient states she would like to have a full lab draw order and would like to be scheduled with her .     Referral only -Specialty  Location     OK to leave a message on voice mail? Yes    Primary language: English      needed? No    Call taken on November 30, 2020 at 8:14 AM by April Thaddeus    Order request route to [facility]   Referrals Route to [facility]) CARE COORDINATOR

## 2020-11-30 NOTE — TELEPHONE ENCOUNTER
RN contacted patient and helped schedule lab only appointment for 12/28/20 at her request.   Tata Bell RN

## 2020-12-14 DIAGNOSIS — I10 ESSENTIAL HYPERTENSION, BENIGN: ICD-10-CM

## 2020-12-14 DIAGNOSIS — E03.4 HYPOTHYROIDISM DUE TO ACQUIRED ATROPHY OF THYROID: ICD-10-CM

## 2020-12-14 DIAGNOSIS — E78.2 MIXED HYPERLIPIDEMIA: ICD-10-CM

## 2020-12-14 LAB
ALBUMIN SERPL-MCNC: 4.6 MG/DL (ref 3.5–4.7)
ALP SERPL-CCNC: 61 U/L (ref 31.7–110.5)
ALT SERPL-CCNC: 15.7 U/L (ref 0–45)
AST SERPL-CCNC: 20.2 U/L (ref 0–45)
BILIRUB SERPL-MCNC: 0.9 MG/DL (ref 0.2–1.3)
BUN SERPL-MCNC: 16.4 MG/DL (ref 7–19)
CALCIUM SERPL-MCNC: 9.7 MG/DL (ref 8.5–10.1)
CHLORIDE SERPLBLD-SCNC: 97.4 MMOL/L (ref 98–110)
CHOLEST SERPL-MCNC: 175.8 MG/DL (ref 0–200)
CHOLEST/HDLC SERPL: 3.9 {RATIO} (ref 0–5)
CO2 SERPL-SCNC: 29.8 MMOL/L (ref 20–32)
CREAT SERPL-MCNC: 0.9 MG/DL (ref 0.5–1)
GFR SERPL CREATININE-BSD FRML MDRD: 67 ML/MIN/1.7 M2
GLUCOSE SERPL-MCNC: 92.4 MG'DL (ref 70–99)
HDLC SERPL-MCNC: 45.4 MG/DL
LDLC SERPL CALC-MCNC: 87 MG/DL (ref 0–129)
POTASSIUM SERPL-SCNC: 3.2 MMOL/L (ref 3.3–4.5)
PROT SERPL-MCNC: 7.2 G/DL (ref 6.8–8.8)
SODIUM SERPL-SCNC: 132.2 MMOL/L (ref 132.6–141.4)
TRIGL SERPL-MCNC: 217.3 MG/DL (ref 0–150)
TSH SERPL DL<=0.005 MIU/L-ACNC: 0.79 MU/L (ref 0.4–4)
VLDL CHOLESTEROL: 43.5 MG/DL (ref 7–32)

## 2020-12-14 PROCEDURE — 80061 LIPID PANEL: CPT

## 2020-12-14 PROCEDURE — 80053 COMPREHEN METABOLIC PANEL: CPT

## 2020-12-14 PROCEDURE — 84443 ASSAY THYROID STIM HORMONE: CPT | Performed by: FAMILY MEDICINE

## 2020-12-14 PROCEDURE — 36415 COLL VENOUS BLD VENIPUNCTURE: CPT

## 2020-12-22 DIAGNOSIS — I10 BENIGN ESSENTIAL HYPERTENSION: ICD-10-CM

## 2020-12-22 RX ORDER — POTASSIUM CHLORIDE 750 MG/1
10 TABLET, EXTENDED RELEASE ORAL DAILY
Qty: 90 TABLET | Refills: 2 | Status: SHIPPED | OUTPATIENT
Start: 2020-12-22 | End: 2021-04-14

## 2020-12-22 NOTE — TELEPHONE ENCOUNTER
Pot[facility] so fill date/quantity unknown -- pt is completely out of medication and asked [facility] to send the request for her/ help expedite the process.      If appropriate, please send new rx to [facility] for delivery to patient's home.    Thank you,    Ambika Joy, PharmD  [facility]  [phone]

## 2021-01-21 NOTE — PROGRESS NOTES
Boroke is a 87 year old who is being evaluated via a billable telephone visit.      9:50  AM   How would you like to obtain your AVS? Mail a copy  Assessment & Plan     Hereditary and idiopathic peripheral neuropathy  Patient's neuropathy is stable she has significant balance problem problems and so is continuing to use her walker otherwise she is feeling well and will follow up if there is other concerns    Mixed hyperlipidemia  This is stable we will continue her current medications plan for rechecking a mildly low potassium at next visit.         35 minutes spent on the date of the encounter doing chart review, review of test results, patient visit and documentation                Return in about 3 months (around 4/22/2021) for In person Visit, at which time we would also recheck labs..    Martin Walton MD  [facility]    Zoe Cox is a 87 year old who presents to clinic today for the following health issues     HPI   Right Shoulder Still painful after fall in October   - able to use it wanted to update record -does not want it invesitgate or have PT, she reports that she is able to cope she is using a walker and working on her balance she has limited exercise and again is not interested in physical therapy.  Completing papers for advanced directives.  We discussed her advanced directives she is working on this with her  and will complete it and bring it in.  Hypokalemia l  A mildly low potassium was slightly would like it checked in June.   Memory difficulties reports difficulty with memory. Benign forgetfulness these are mostly about forgetting names and dates she on further history she did denies that there is any pathologic changes and this is confirmed by her partner.  No other questions           Review of Systems         Objective           Vitals:  No vitals were obtained today due to virtual visit.    Physical Exam   healthy, alert and no distress  PSYCH: Alert  and oriented times 3; coherent speech, normal   rate and volume, able to articulate logical thoughts, able   to abstract reason, no tangential thoughts, no hallucinations   or delusions  Her affect is normal  RESP: No cough, no audible wheezing, able to talk in full sentences  Remainder of exam unable to be completed due to telephone visits                Phone call duration: 30 minutes

## 2021-01-22 ENCOUNTER — VIRTUAL VISIT (OUTPATIENT)
Dept: FAMILY MEDICINE | Facility: CLINIC | Age: 86
End: 2021-01-22
Payer: COMMERCIAL

## 2021-01-22 DIAGNOSIS — E78.2 MIXED HYPERLIPIDEMIA: ICD-10-CM

## 2021-01-22 DIAGNOSIS — G60.9 HEREDITARY AND IDIOPATHIC PERIPHERAL NEUROPATHY: ICD-10-CM

## 2021-01-22 DIAGNOSIS — E87.6 HYPOKALEMIA: Primary | ICD-10-CM

## 2021-01-22 PROCEDURE — 99214 OFFICE O/P EST MOD 30 MIN: CPT | Mod: 95 | Performed by: FAMILY MEDICINE

## 2021-01-22 NOTE — PATIENT INSTRUCTIONS
Here is the plan from today's visit    1. Hypokalemia  The potassium is slightly low and we will recheck it at your next visit and eating bananas oranges and other fruit is a good way to manage this to    2. Hereditary and idiopathic peripheral neuropathy  This has been diagnosed and is a problem though you are managing it with using your walker.    3. Mixed hyperlipidemia  We will check this again at your visit in June.      Please call or return to clinic if your symptoms don't go away.    Follow up plan  Return in about 3 months (around 4/22/2021) for In person Visit, at which time we would also recheck labs..     Thank you for coming to [facility] today.  Lab Testing:  **If you had lab testing today and your results are reassuring or normal they will be mailed to you or sent through [facility] within 7 days.   **If the lab tests need quick action we will call you with the results.  The phone number we will call with results is # [phone] (home) . If this is not the best number please call our clinic and change the number.  Medication Refills:  If you need any refills please call your pharmacy and they will contact us.   If you need to  your refill at a new pharmacy, please contact the new pharmacy directly. The new pharmacy will help you get your medications transferred faster.   Scheduling:  If you have any concerns about today's visit or wish to schedule another appointment please call our office during normal business hours [phone] (8-5:00 M-F)   eferrals to [facility] please call [phone].   Mammogram Scheduling [phone]     XRay/CT/Ultrasound/MRI Scheduling [phone]    Medical Concerns:  If you have urgent medical concerns please call [phone] at any time of the day.    Martin Walton MD

## 2021-03-13 ENCOUNTER — IMMUNIZATION (OUTPATIENT)
Dept: FAMILY MEDICINE | Facility: CLINIC | Age: 86
End: 2021-03-13
Payer: COMMERCIAL

## 2021-03-13 PROCEDURE — 0001A PR COVID VAC PFIZER DIL RECON 30 MCG/0.3 ML IM: CPT

## 2021-03-13 PROCEDURE — 91300 PR COVID VAC PFIZER DIL RECON 30 MCG/0.3 ML IM: CPT

## 2021-03-26 ENCOUNTER — TELEPHONE (OUTPATIENT)
Dept: FAMILY MEDICINE | Facility: CLINIC | Age: 86
End: 2021-03-26

## 2021-03-26 DIAGNOSIS — E78.2 MIXED HYPERLIPIDEMIA: ICD-10-CM

## 2021-03-26 RX ORDER — SIMVASTATIN 20 MG
20 TABLET ORAL AT BEDTIME
Qty: 90 TABLET | Refills: 0 | Status: SHIPPED | OUTPATIENT
Start: 2021-03-26 | End: 2021-04-14

## 2021-03-26 NOTE — TELEPHONE ENCOUNTER
Verify that the refill encounter hasn't been started Yes    [facility] phone call message- patient requesting a refill:    Full Medication Name: simvastatin (ZOCOR) 20 MG tablet     Dose: *     Pharmacy confirmed as   [facility] - [place], [place] - [address]  [address] [address]  Phone: [phone] Fax: [phone]    [facility] - [place], [place] - [address] [facility] [address] [facility] [address]  Phone: [phone] Fax: [phone]  : No: *    Medication tab checked to see if medication has been sent  Yes    Additional Comments:out of med's [facility]    OK to leave a message on voice mail? Yes    Advised patient refill may take up to 2 business days? Yes    Primary language: English      needed? No    Call taken on March 26, 2021 at 1:05 PM by Dominic Salazar    Route to [facility] MED REFILL

## 2021-03-26 NOTE — TELEPHONE ENCOUNTER
Patient requesting refill of simvastatin 20 mg- not on current medication list so RN unable to fill. Last office visit virtual on 1/22/21. Routing to PCP to re-order if appropriate.   Tata Bell RN

## 2021-03-29 DIAGNOSIS — I10 ESSENTIAL HYPERTENSION, BENIGN: ICD-10-CM

## 2021-03-29 DIAGNOSIS — E03.4 HYPOTHYROIDISM DUE TO ACQUIRED ATROPHY OF THYROID: ICD-10-CM

## 2021-03-29 RX ORDER — LEVOTHYROXINE SODIUM 50 UG/1
50 TABLET ORAL DAILY
Qty: 90 TABLET | Refills: 0 | Status: SHIPPED | OUTPATIENT
Start: 2021-03-29 | End: 2021-04-14

## 2021-03-29 RX ORDER — HYDROCHLOROTHIAZIDE 25 MG/1
25 TABLET ORAL DAILY
Qty: 90 TABLET | Refills: 0 | Status: SHIPPED | OUTPATIENT
Start: 2021-03-29 | End: 2021-04-14

## 2021-03-29 NOTE — TELEPHONE ENCOUNTER
RN refilled per protocol since pt out and put not in to follow up for appt per last office note    Monika Issa RN

## 2021-03-29 NOTE — TELEPHONE ENCOUNTER
Verify that the refill encounter hasn't been started Yes    [facility] phone call message- patient requesting a refill:    Full Medication Name:   levothyroxine (SYNTHROID/LEVOTHROID) 50 MCG tablet   hydrochlorothiazide (HYDRODIURIL) 25 MG tablet       Dose:      Pharmacy confirmed as   [facility] - [place], [place] - [address] [place] [address]  Phone: [phone] Fax: [phone]    : Yes    Medication tab checked to see if medication has been sent  Yes    Additional Comments: Out of medication     OK to leave a message on voice mail? Yes    Advised patient refill may take up to 2 business days? No:     Primary language: English      needed? No    Call taken on March 29, 2021 at 9:05 AM by Елена Nunn    Route to [facility] MED REFILL

## 2021-04-03 ENCOUNTER — IMMUNIZATION (OUTPATIENT)
Dept: FAMILY MEDICINE | Facility: CLINIC | Age: 86
End: 2021-04-03
Attending: FAMILY MEDICINE
Payer: COMMERCIAL

## 2021-04-03 PROCEDURE — 0002A PR COVID VAC PFIZER DIL RECON 30 MCG/0.3 ML IM: CPT

## 2021-04-03 PROCEDURE — 91300 PR COVID VAC PFIZER DIL RECON 30 MCG/0.3 ML IM: CPT

## 2021-04-13 ENCOUNTER — NURSING HOME VISIT (OUTPATIENT)
Dept: GERIATRICS | Facility: CLINIC | Age: 86
End: 2021-04-13
Payer: COMMERCIAL

## 2021-04-13 VITALS
HEIGHT: 59 IN | RESPIRATION RATE: 14 BRPM | BODY MASS INDEX: 25.78 KG/M2 | TEMPERATURE: 98.7 F | SYSTOLIC BLOOD PRESSURE: 144 MMHG | HEART RATE: 80 BPM | WEIGHT: 127.9 LBS | DIASTOLIC BLOOD PRESSURE: 87 MMHG | OXYGEN SATURATION: 97 %

## 2021-04-13 DIAGNOSIS — E87.6 DIURETIC-INDUCED HYPOKALEMIA: Primary | ICD-10-CM

## 2021-04-13 DIAGNOSIS — J45.909 UNCOMPLICATED ASTHMA, UNSPECIFIED ASTHMA SEVERITY, UNSPECIFIED WHETHER PERSISTENT: ICD-10-CM

## 2021-04-13 DIAGNOSIS — G60.9 HEREDITARY AND IDIOPATHIC PERIPHERAL NEUROPATHY: ICD-10-CM

## 2021-04-13 DIAGNOSIS — I10 ESSENTIAL HYPERTENSION: ICD-10-CM

## 2021-04-13 DIAGNOSIS — T50.2X5A DIURETIC-INDUCED HYPOKALEMIA: Primary | ICD-10-CM

## 2021-04-13 PROCEDURE — 99309 SBSQ NF CARE MODERATE MDM 30: CPT | Performed by: NURSE PRACTITIONER

## 2021-04-13 RX ORDER — ACETAMINOPHEN 500 MG
1000 TABLET ORAL EVERY 6 HOURS PRN
COMMUNITY
Start: 2021-04-08

## 2021-04-13 RX ORDER — ALBUTEROL SULFATE 90 UG/1
2 AEROSOL, METERED RESPIRATORY (INHALATION) 4 TIMES DAILY PRN
COMMUNITY
Start: 2020-01-28

## 2021-04-13 RX ORDER — POTASSIUM CHLORIDE 750 MG/1
10 TABLET, EXTENDED RELEASE ORAL 2 TIMES DAILY
COMMUNITY
Start: 2020-01-15 | End: 2021-04-15

## 2021-04-13 RX ORDER — UBIDECARENONE 200 MG
1 CAPSULE ORAL DAILY
COMMUNITY
Start: 2020-01-29 | End: 2021-04-14

## 2021-04-13 RX ORDER — SIMVASTATIN 20 MG
1 TABLET ORAL DAILY
COMMUNITY
Start: 2020-01-28 | End: 2021-04-15

## 2021-04-13 RX ORDER — LEVOTHYROXINE SODIUM 50 UG/1
1 TABLET ORAL DAILY
COMMUNITY
Start: 2020-01-29 | End: 2021-04-15

## 2021-04-13 RX ORDER — HYDROCHLOROTHIAZIDE 12.5 MG/1
25 TABLET ORAL DAILY
COMMUNITY
Start: 2010-11-12 | End: 2021-04-15

## 2021-04-13 RX ORDER — MULTIVIT WITH MINERALS/LUTEIN
1 TABLET ORAL DAILY
COMMUNITY
End: 2021-04-15

## 2021-04-13 ASSESSMENT — MIFFLIN-ST. JEOR: SCORE: 915.78

## 2021-04-13 NOTE — PROGRESS NOTES
[facility]  PRIMARY CARE PROVIDER AND CLINIC:  Martin Walton MD, [address] [address] / [facility] [address]  Chief Complaint   Patient presents with     [facility] F/U     [facility] Medical Record Number:  [id number]  Place of Service where encounter took place:  [facility] (U) [[id number]]    Brooke Xie  is a 88 year old  (1/28/1933), admitted to the above facility from  [facility] . Hospital stay 4/6/21 through 4/8/21..  Admitted to this facility for  rehab, medical management and nursing care.    HPI:    HPI information obtained from: facility chart records, facility staff, patient report, [facility] chart review and Care Everywhere [facility] chart review.   Brief Summary of Hospital Course: PMH HTN, asthma, HLD, hypothyroid, and peripheral neuropathy. She presented to [facility] following a fall with a head laceration. She was incidentally noted to have a K 2.8, so she was admitted for this reason. Hypokalemia was thought to be diuretic induced. Her daily supplement was increased.     Updates on Status Since Skilled nursing Admission:   Patient reports that she is generally doing well. She did not sleep well last night, so she is very tired today. She isn't sure why she couldn't sleep, this is not a chronic issue. She is doing well with therapy, they actually plan to discharge her 4/15 and she'll go home 4/16. BP has been 120-140s/60-80s      CODE STATUS/ADVANCE DIRECTIVES DISCUSSION:   DNR only  Patient's living condition: lives with spouse  ALLERGIES: Atorvastatin, Fosamax, Niaspan [niacin], Omega-3-acid ethyl esters, and Zyrtec-d  PAST MEDICAL HISTORY:  has no past medical history on file.  PAST SURGICAL HISTORY:   has a past surgical history that includes ENT surgery and tonsillectomy.  FAMILY HISTORY: family history includes Breast Cancer in her sister; C.A.D. in her father; Cancer - colorectal in her father; Melanoma in her mother.  SOCIAL  "HISTORY:   reports that she has never smoked. She has never used smokeless tobacco. She reports that she does not drink alcohol or use drugs.    Post Discharge Medication Reconciliation Status: discharge medications reconciled, continue medications without change    Current Outpatient Medications   Medication Sig Dispense Refill     acetaminophen (TYLENOL) 500 MG tablet Take 1,000 mg by mouth every 6 hours as needed       albuterol (VENTOLIN HFA) 108 (90 Base) MCG/ACT inhaler Inhale 2 puffs into the lungs 4 times daily as needed       Calcium-Vitamin D (CALTRATE 600 PLUS-VIT D OR) Take 1 tablet by mouth daily.       cholecalciferol 25 MCG (1000 UT) TABS Take 1 tablet by mouth daily       co-enzyme Q-10 (CO Q-10) 30 MG CAPS Take 30 mg by mouth daily.       fluticasone-salmeterol (ADVAIR DISKUS) 100-50 MCG/DOSE inhaler Inhale 1 puff into the lungs every 12 hours       hydrochlorothiazide (HYDRODIURIL) 12.5 MG tablet Take 25 mg by mouth daily       levothyroxine (SYNTHROID/LEVOTHROID) 50 MCG tablet Take 1 tablet by mouth daily       multivitamin (CENTRUM SILVER) tablet Take 1 tablet by mouth daily       ORDER FOR DME Equipment being ordered: Cane 1 Units 0     potassium chloride ER (K-TAB/KLOR-CON) 10 MEQ CR tablet Take 10 mEq by mouth 2 times daily       simvastatin (ZOCOR) 20 MG tablet Take 1 tablet by mouth daily           ROS:  10 point ROS of systems including Constitutional, Eyes, Respiratory, Cardiovascular, Gastroenterology, Genitourinary, Integumentary, Musculoskeletal, Psychiatric were all negative except for pertinent positives noted in my HPI.    Vitals:  BP (!) 144/87   Pulse 80   Temp 98.7  F (37.1  C)   Resp 14   Ht 1.499 m (4' 11\")   Wt 58 kg (127 lb 14.4 oz)   SpO2 97%   BMI 25.83 kg/m    Exam:  GENERAL APPEARANCE:  Alert, in no distress, oriented  ENT:  Mouth and posterior oropharynx normal, moist mucous membranes, normal hearing acuity  EYES:  EOM, conjunctivae, lids, pupils and irises " normal  RESP:  respiratory effort and palpation of chest normal, lungs clear to auscultation , no respiratory distress  CV:  Palpation and auscultation of heart done , regular rate and rhythm, no murmur, rub, or gallop, no edema  ABDOMEN:  normal bowel sounds, soft, nontender, no hepatosplenomegaly or other masses  SKIN:  difficult to visualize scalp laceration due to hair and some dried blood, but no obvious evidence of poor healing  PSYCH:  oriented X 3, affect and mood normal    Lab/Diagnostic data:  Recent labs in [facility] reviewed by me today.    4/9/21  K 4.1    ASSESSMENT/PLAN:  (E87.6,  T50.2X5A) Diuretic-induced hypokalemia  (primary encounter diagnosis)  Comment: Improved with increased supplement  Plan: Continue current POC with no changes at this time and adjustments as needed.    (G60.9) Hereditary and idiopathic peripheral neuropathy  Comment: Chronic. Therapy feels she will be safe to return home  Plan: Continue current POC with no changes at this time and adjustments as needed.    (J45.884) Uncomplicated asthma, unspecified asthma severity, unspecified whether persistent  Comment: Chronic condition being managed with medications and is currently asymptomatic.  Plan: Continue current POC with no changes at this time and adjustments as needed.    (I10) Essential hypertension  Comment: Chronic, controlled. To avoid risk of hypotension, falls, dizziness and tissue hypoperfusion, recommend  BP goal is < 150/90mmHg.  Plan: Continue current POC with no changes at this time and adjustments as needed.      Electronically signed by:  ALLIE Ewing [facility]  Phone: [phone]

## 2021-04-13 NOTE — LETTER
4/13/2021        RE: Brooke Xie  [address]  [address]  [facility] [address]        [facility]  PRIMARY CARE PROVIDER AND CLINIC:  Martin Walton MD, [address] / [facility] [address]  Chief Complaint   Patient presents with     [facility] F/U     [facility] Medical Record Number:  [id number]  Place of Service where encounter took place:  [facility] (TCU) [[id number]]    Brooke Xie  is a 88 year old  (1/28/1933), admitted to the above facility from  [facility] . Hospital stay 4/6/21 through 4/8/21..  Admitted to this facility for  rehab, medical management and nursing care.    HPI:    HPI information obtained from: facility chart records, facility staff, patient report, [facility] chart review and Care Everywhere [facility] chart review.   Brief Summary of Hospital Course: PMH HTN, asthma, HLD, hypothyroid, and peripheral neuropathy. She presented to [facility] following a fall with a head laceration. She was incidentally noted to have a K 2.8, so she was admitted for this reason. Hypokalemia was thought to be diuretic induced. Her daily supplement was increased.     Updates on Status Since Skilled nursing Admission:   Patient reports that she is generally doing well. She did not sleep well last night, so she is very tired today. She isn't sure why she couldn't sleep, this is not a chronic issue. She is doing well with therapy, they actually plan to discharge her 4/15 and she'll go home 4/16. BP has been 120-140s/60-80s      CODE STATUS/ADVANCE DIRECTIVES DISCUSSION:   DNR only  Patient's living condition: lives with spouse  ALLERGIES: Atorvastatin, Fosamax, Niaspan [niacin], Omega-3-acid ethyl esters, and Zyrtec-d  PAST MEDICAL HISTORY:  has no past medical history on file.  PAST SURGICAL HISTORY:   has a past surgical history that includes ENT surgery and tonsillectomy.  FAMILY HISTORY: family history includes Breast  "Cancer in her sister; C.A.D. in her father; Cancer - colorectal in her father; Melanoma in her mother.  SOCIAL HISTORY:   reports that she has never smoked. She has never used smokeless tobacco. She reports that she does not drink alcohol or use drugs.    Post Discharge Medication Reconciliation Status: discharge medications reconciled, continue medications without change    Current Outpatient Medications   Medication Sig Dispense Refill     acetaminophen (TYLENOL) 500 MG tablet Take 1,000 mg by mouth every 6 hours as needed       albuterol (VENTOLIN HFA) 108 (90 Base) MCG/ACT inhaler Inhale 2 puffs into the lungs 4 times daily as needed       Calcium-Vitamin D (CALTRATE 600 PLUS-VIT D OR) Take 1 tablet by mouth daily.       cholecalciferol 25 MCG (1000 UT) TABS Take 1 tablet by mouth daily       co-enzyme Q-10 (CO Q-10) 30 MG CAPS Take 30 mg by mouth daily.       fluticasone-salmeterol (ADVAIR DISKUS) 100-50 MCG/DOSE inhaler Inhale 1 puff into the lungs every 12 hours       hydrochlorothiazide (HYDRODIURIL) 12.5 MG tablet Take 25 mg by mouth daily       levothyroxine (SYNTHROID/LEVOTHROID) 50 MCG tablet Take 1 tablet by mouth daily       multivitamin (CENTRUM SILVER) tablet Take 1 tablet by mouth daily       ORDER FOR DME Equipment being ordered: Cane 1 Units 0     potassium chloride ER (K-TAB/KLOR-CON) 10 MEQ CR tablet Take 10 mEq by mouth 2 times daily       simvastatin (ZOCOR) 20 MG tablet Take 1 tablet by mouth daily           ROS:  10 point ROS of systems including Constitutional, Eyes, Respiratory, Cardiovascular, Gastroenterology, Genitourinary, Integumentary, Musculoskeletal, Psychiatric were all negative except for pertinent positives noted in my HPI.    Vitals:  BP (!) 144/87   Pulse 80   Temp 98.7  F (37.1  C)   Resp 14   Ht 1.499 m (4' 11\")   Wt 58 kg (127 lb 14.4 oz)   SpO2 97%   BMI 25.83 kg/m    Exam:  GENERAL APPEARANCE:  Alert, in no distress, oriented  ENT:  Mouth and posterior oropharynx " normal, moist mucous membranes, normal hearing acuity  EYES:  EOM, conjunctivae, lids, pupils and irises normal  RESP:  respiratory effort and palpation of chest normal, lungs clear to auscultation , no respiratory distress  CV:  Palpation and auscultation of heart done , regular rate and rhythm, no murmur, rub, or gallop, no edema  ABDOMEN:  normal bowel sounds, soft, nontender, no hepatosplenomegaly or other masses  SKIN:  difficult to visualize scalp laceration due to hair and some dried blood, but no obvious evidence of poor healing  PSYCH:  oriented X 3, affect and mood normal    Lab/Diagnostic data:  Recent labs in [facility] reviewed by me today.    4/9/21  K 4.1    ASSESSMENT/PLAN:  (E87.6,  T50.2X5A) Diuretic-induced hypokalemia  (primary encounter diagnosis)  Comment: Improved with increased supplement  Plan: Continue current POC with no changes at this time and adjustments as needed.    (G60.9) Hereditary and idiopathic peripheral neuropathy  Comment: Chronic. Therapy feels she will be safe to return home  Plan: Continue current POC with no changes at this time and adjustments as needed.    (J45.909) Uncomplicated asthma, unspecified asthma severity, unspecified whether persistent  Comment: Chronic condition being managed with medications and is currently asymptomatic.  Plan: Continue current POC with no changes at this time and adjustments as needed.    (I10) Essential hypertension  Comment: Chronic, controlled. To avoid risk of hypotension, falls, dizziness and tissue hypoperfusion, recommend  BP goal is < 150/90mmHg.  Plan: Continue current POC with no changes at this time and adjustments as needed.      Electronically signed by:  ALLIE Ewing [facility]  Phone: [phone]

## 2021-04-15 ENCOUNTER — DISCHARGE SUMMARY NURSING HOME (OUTPATIENT)
Dept: GERIATRICS | Facility: CLINIC | Age: 86
End: 2021-04-15
Payer: COMMERCIAL

## 2021-04-15 VITALS
SYSTOLIC BLOOD PRESSURE: 142 MMHG | DIASTOLIC BLOOD PRESSURE: 80 MMHG | HEART RATE: 86 BPM | HEIGHT: 59 IN | WEIGHT: 128.5 LBS | BODY MASS INDEX: 25.91 KG/M2 | TEMPERATURE: 98.3 F | RESPIRATION RATE: 18 BRPM | OXYGEN SATURATION: 96 %

## 2021-04-15 DIAGNOSIS — I10 ESSENTIAL HYPERTENSION: ICD-10-CM

## 2021-04-15 DIAGNOSIS — G60.9 HEREDITARY AND IDIOPATHIC PERIPHERAL NEUROPATHY: ICD-10-CM

## 2021-04-15 DIAGNOSIS — T50.2X5A DIURETIC-INDUCED HYPOKALEMIA: Primary | ICD-10-CM

## 2021-04-15 DIAGNOSIS — J45.909 UNCOMPLICATED ASTHMA, UNSPECIFIED ASTHMA SEVERITY, UNSPECIFIED WHETHER PERSISTENT: ICD-10-CM

## 2021-04-15 DIAGNOSIS — E87.6 DIURETIC-INDUCED HYPOKALEMIA: Primary | ICD-10-CM

## 2021-04-15 PROCEDURE — 99316 NF DSCHRG MGMT 30 MIN+: CPT | Performed by: NURSE PRACTITIONER

## 2021-04-15 RX ORDER — SIMVASTATIN 20 MG
20 TABLET ORAL DAILY
Qty: 90 TABLET | Refills: 0 | Status: SHIPPED | OUTPATIENT
Start: 2021-04-15 | End: 2021-07-12

## 2021-04-15 RX ORDER — LEVOTHYROXINE SODIUM 50 UG/1
50 TABLET ORAL DAILY
Qty: 90 TABLET | Refills: 0 | Status: SHIPPED | OUTPATIENT
Start: 2021-04-15

## 2021-04-15 RX ORDER — MULTIVIT WITH MINERALS/LUTEIN
1 TABLET ORAL DAILY
Qty: 90 TABLET | Refills: 0 | Status: SHIPPED | OUTPATIENT
Start: 2021-04-15

## 2021-04-15 RX ORDER — HYDROCHLOROTHIAZIDE 12.5 MG/1
25 TABLET ORAL DAILY
Qty: 90 TABLET | Refills: 0 | Status: SHIPPED | OUTPATIENT
Start: 2021-04-15

## 2021-04-15 RX ORDER — POTASSIUM CHLORIDE 750 MG/1
10 TABLET, EXTENDED RELEASE ORAL 2 TIMES DAILY
Qty: 180 TABLET | Refills: 0 | Status: SHIPPED | OUTPATIENT
Start: 2021-04-15 | End: 2021-09-02

## 2021-04-15 ASSESSMENT — MIFFLIN-ST. JEOR: SCORE: 918.5

## 2021-04-15 NOTE — LETTER
4/15/2021        RE: Brooke Xie  [address]  [address]  [facility] [address]        [place] [facility] DISCHARGE SUMMARY  PATIENT'S NAME: Brooke Xie  YOB: 1933  MEDICAL RECORD NUMBER:  [id number]  Place of Service where encounter took place:  [facility] (TCU) [[id number]]    PRIMARY CARE PROVIDER AND CLINIC RESPONSIBLE AFTER TRANSFER:   Martin Walton MD, [address] [facility] [address] / [facility] [address]    [facility] [place]     Transferring providers: ALLIE Ewing CNP, Gene Minaya MD  Recent Hospitalization/ED:  Hospital  [facility]  stay 4/6/21 to 4/8/21.  Date of SNF Admission: April/08/2021  Date of SNF (anticipated) Discharge: April/16/2021  Discharged to: previous independent home  Cognitive Scores: BIMS: 13/15      CODE STATUS/ADVANCE DIRECTIVES DISCUSSION:  DNI  ALLERGIES: Atorvastatin, Fosamax, Niaspan [niacin], Omega-3-acid ethyl esters, and Zyrtec-d    DISCHARGE DIAGNOSIS/NURSING FACILITY COURSE:   [facility] stay 4/6/21 through 4/8/21. Admitted to this facility for  rehab, medical management and nursing care. PMH HTN, asthma, HLD, hypothyroid, and peripheral neuropathy. She presented to [facility] following a fall with a head laceration. She was incidentally noted to have a K 2.8, so she was admitted for this reason. Hypokalemia was thought to be diuretic induced. Her daily supplement was increased.     Diuretic-induced hypokalemia  4/9 K 4.1  She will continue with her increased dose of potassium    Hereditary and idiopathic peripheral neuropathy  Patient did well with therapy. She feels comfortable going home and is excited to get back to her . She will have home PT/OT to help with the transition.    Uncomplicated asthma, unspecified asthma severity, unspecified whether persistent  No acute concerns    Essential hypertension  Controlled. No changes were made to medications      Past Medical History:   "has no past medical history on file.    Discharge Medications:    Current Outpatient Medications   Medication Sig Dispense Refill     acetaminophen (TYLENOL) 500 MG tablet Take 1,000 mg by mouth every 6 hours as needed       albuterol (VENTOLIN HFA) 108 (90 Base) MCG/ACT inhaler Inhale 2 puffs into the lungs 4 times daily as needed       Calcium-Vitamin D (CALTRATE 600 PLUS-VIT D OR) Take 1 tablet by mouth daily.       cholecalciferol 25 MCG (1000 UT) TABS Take 1 tablet by mouth daily       co-enzyme Q-10 (CO Q-10) 30 MG CAPS Take 30 mg by mouth daily.       fluticasone-salmeterol (ADVAIR DISKUS) 100-50 MCG/DOSE inhaler Inhale 1 puff into the lungs every 12 hours       hydrochlorothiazide (HYDRODIURIL) 12.5 MG tablet Take 25 mg by mouth daily       levothyroxine (SYNTHROID/LEVOTHROID) 50 MCG tablet Take 1 tablet by mouth daily       multivitamin (CENTRUM SILVER) tablet Take 1 tablet by mouth daily       ORDER FOR DME Equipment being ordered: Cane 1 Units 0     potassium chloride ER (K-TAB/KLOR-CON) 10 MEQ CR tablet Take 10 mEq by mouth 2 times daily       simvastatin (ZOCOR) 20 MG tablet Take 1 tablet by mouth daily         Medication Changes/Rationale:     none    Controlled medications sent with patient:   not applicable/none     ROS:   10 point ROS of systems including Constitutional, Eyes, Respiratory, Cardiovascular, Gastroenterology, Genitourinary, Integumentary, Musculoskeletal, Psychiatric were all negative except for pertinent positives noted in my HPI.    Physical Exam:   Vitals: BP (!) 142/80   Pulse 86   Temp 98.3  F (36.8  C)   Resp 18   Ht 1.499 m (4' 11\")   Wt 58.3 kg (128 lb 8 oz)   SpO2 96%   BMI 25.95 kg/m    BMI= Body mass index is 25.95 kg/m .   GENERAL APPEARANCE:  Alert, in no distress, oriented  ENT:  Mouth and posterior oropharynx normal, moist mucous membranes, normal hearing acuity  EYES:  EOM, conjunctivae, lids, pupils and irises normal  RESP:  respiratory effort and palpation of " chest normal, lungs clear to auscultation , no respiratory distress  CV:  Palpation and auscultation of heart done , regular rate and rhythm, no murmur, rub, or gallop, no edema  ABDOMEN:  normal bowel sounds, soft, nontender, no hepatosplenomegaly or other masses  SKIN:  difficult to visualize scalp laceration due to hair and some dried blood, but no obvious evidence of poor healing  PSYCH:  oriented X 3, affect and mood normal      SNF labs: 4/9/21 K 4.1    DISCHARGE PLAN:    Follow up labs: Recommed K recheck at PCP f/u    Medical Follow Up:      Follow up with primary care provider in 1-2 weeks    MTM referral needed and placed by this provider: No    Discharge Services: Home Care:  Occupational Therapy, Physical Therapy, Registered Nurse and From:  [facility]    Discharge Instructions Verbalized to Patient at Discharge:     None      TOTAL DISCHARGE TIME:   Greater than 30 minutes  Electronically signed by:  ALLIE Ewing CNP   [facility]  Phone: [phone]    Home care Face to Face documentation done in EPIC attached to Home care orders for [facility].

## 2021-04-15 NOTE — PROGRESS NOTES
[place] [facility] DISCHARGE SUMMARY  PATIENT'S NAME: Brooke Xie  YOB: 1933  MEDICAL RECORD NUMBER:  [id number]  Place of Service where encounter took place:  [facility] ([facility]) [[id number]]    PRIMARY CARE PROVIDER AND CLINIC RESPONSIBLE AFTER TRANSFER:   Martin Walton MD, [address] [place] [address] / [facility] [address]    [facility] [place]     Transferring providers: ALLIE Ewing CNP, Gene Minaya MD  Recent Hospitalization/ED:  Hospital  [facility]  stay 4/6/21 to 4/8/21.  Date of SNF Admission: April/08/2021  Date of SNF (anticipated) Discharge: April/16/2021  Discharged to: previous independent home  Cognitive Scores: BIMS: 13/15      CODE STATUS/ADVANCE DIRECTIVES DISCUSSION:  DNI  ALLERGIES: Atorvastatin, Fosamax, Niaspan [niacin], Omega-3-acid ethyl esters, and Zyrtec-d    DISCHARGE DIAGNOSIS/NURSING FACILITY COURSE:   [facility] stay 4/6/21 through 4/8/21. Admitted to this facility for  rehab, medical management and nursing care. PMH HTN, asthma, HLD, hypothyroid, and peripheral neuropathy. She presented to [facility] following a fall with a head laceration. She was incidentally noted to have a K 2.8, so she was admitted for this reason. Hypokalemia was thought to be diuretic induced. Her daily supplement was increased.     Diuretic-induced hypokalemia  4/9 K 4.1  She will continue with her increased dose of potassium    Hereditary and idiopathic peripheral neuropathy  Patient did well with therapy. She feels comfortable going home and is excited to get back to her . She will have home PT/OT to help with the transition.    Uncomplicated asthma, unspecified asthma severity, unspecified whether persistent  No acute concerns    Essential hypertension  Controlled. No changes were made to medications      Past Medical History:  has no past medical history on file.    Discharge Medications:    Current Outpatient Medications  "  Medication Sig Dispense Refill     acetaminophen (TYLENOL) 500 MG tablet Take 1,000 mg by mouth every 6 hours as needed       albuterol (VENTOLIN HFA) 108 (90 Base) MCG/ACT inhaler Inhale 2 puffs into the lungs 4 times daily as needed       Calcium-Vitamin D (CALTRATE 600 PLUS-VIT D OR) Take 1 tablet by mouth daily.       cholecalciferol 25 MCG (1000 UT) TABS Take 1 tablet by mouth daily       co-enzyme Q-10 (CO Q-10) 30 MG CAPS Take 30 mg by mouth daily.       fluticasone-salmeterol (ADVAIR DISKUS) 100-50 MCG/DOSE inhaler Inhale 1 puff into the lungs every 12 hours       hydrochlorothiazide (HYDRODIURIL) 12.5 MG tablet Take 25 mg by mouth daily       levothyroxine (SYNTHROID/LEVOTHROID) 50 MCG tablet Take 1 tablet by mouth daily       multivitamin (CENTRUM SILVER) tablet Take 1 tablet by mouth daily       ORDER FOR DME Equipment being ordered: Cane 1 Units 0     potassium chloride ER (K-TAB/KLOR-CON) 10 MEQ CR tablet Take 10 mEq by mouth 2 times daily       simvastatin (ZOCOR) 20 MG tablet Take 1 tablet by mouth daily         Medication Changes/Rationale:     none    Controlled medications sent with patient:   not applicable/none     ROS:   10 point ROS of systems including Constitutional, Eyes, Respiratory, Cardiovascular, Gastroenterology, Genitourinary, Integumentary, Musculoskeletal, Psychiatric were all negative except for pertinent positives noted in my HPI.    Physical Exam:   Vitals: BP (!) 142/80   Pulse 86   Temp 98.3  F (36.8  C)   Resp 18   Ht 1.499 m (4' 11\")   Wt 58.3 kg (128 lb 8 oz)   SpO2 96%   BMI 25.95 kg/m    BMI= Body mass index is 25.95 kg/m .   GENERAL APPEARANCE:  Alert, in no distress, oriented  ENT:  Mouth and posterior oropharynx normal, moist mucous membranes, normal hearing acuity  EYES:  EOM, conjunctivae, lids, pupils and irises normal  RESP:  respiratory effort and palpation of chest normal, lungs clear to auscultation , no respiratory distress  CV:  Palpation and " auscultation of heart done , regular rate and rhythm, no murmur, rub, or gallop, no edema  ABDOMEN:  normal bowel sounds, soft, nontender, no hepatosplenomegaly or other masses  SKIN:  difficult to visualize scalp laceration due to hair and some dried blood, but no obvious evidence of poor healing  PSYCH:  oriented X 3, affect and mood normal      SNF labs: 4/9/21 K 4.1    DISCHARGE PLAN:    Follow up labs: Recommed K recheck at PCP f/u    Medical Follow Up:      Follow up with primary care provider in 1-2 weeks    MTM referral needed and placed by this provider: No    Discharge Services: Home Care:  Occupational Therapy, Physical Therapy, Registered Nurse and From:  [facility]    Discharge Instructions Verbalized to Patient at Discharge:     None      TOTAL DISCHARGE TIME:   Greater than 30 minutes  Electronically signed by:  ALLIE Ewing [facility]  Phone: [phone]    Home care Face to Face documentation done in EPIC attached to Home care orders for [facility].

## 2021-04-25 NOTE — PROGRESS NOTES
[facility]        Assessment & Plan     (I10) Essential hypertension, benign goal <150  (primary encounter diagnosis)  Comment: controlled  Plan: continue current medicaitons    (J45.40) Moderate persistent asthma without complication  Comment: controlled   Plan: continue advair    (R29.6) Falls frequently  Comment: no hurrying   Plan: Patient has had several falls in the last few months some of these is resulted in ER visits.  Patient has been sent for balance evaluation and treatment she is not interested.  Willing to do exercises at home per report plan is to hold onto furniture to be careful.      I spent a total of 33 minutes on the day of the visit.   Time spent doing chart review, history and exam, documentation and further activities per the note           Return in about 2 months (around 6/26/2021).    Martin Walton MD  [facility] TERRENCE Cox is a 88 year old who presents for the following health issues     HPI   April 6th fell and cut her head was taken to [facility], transferred to [facility] from ED, Falls were related to her balance problems. Now using a walker though can't use it at home. Hangs on to the furniture at home. Now denies lightheaded, Not dizzy.     Asthma Follow-Up    Was ACT completed today?    Yes    ACT Total Scores 4/26/2021   ACT TOTAL SCORE -   ASTHMA ER VISITS -   ASTHMA HOSPITALIZATIONS -   ACT TOTAL SCORE (Goal Greater than or Equal to 20) 25   In the past 12 months, how many times did you visit the emergency room for your asthma without being admitted to the hospital? 0   In the past 12 months, how many times were you hospitalized overnight because of your asthma? 0          How many days per week do you miss taking your asthma controller medication?  0    Please describe any recent triggers for your asthma: smoke and upper respiratory infections    Have you had any Emergency Room Visits, Urgent Care Visits, or Hospital Admissions  "since your last office visit?  No      How many servings of fruits and vegetables do you eat daily?  2-3    On average, how many sweetened beverages do you drink each day (Examples: soda, juice, sweet tea, etc.  Do NOT count diet or artificially sweetened beverages)?   1    How many days per week do you exercise enough to make your heart beat faster? 4    How many minutes a day do you exercise enough to make your heart beat faster? 9 or less    How many days per week do you miss taking your medication? 0        Review of Systems   Constitutional, HEENT, cardiovascular, pulmonary, gi and gu systems are negative, except as otherwise noted.      Objective    BP (!) 143/87   Pulse 104   Temp 98.8  F (37.1  C) (Oral)   Ht 1.499 m (4' 11\")   Wt 53.5 kg (118 lb)   SpO2 100%   BMI 23.83 kg/m    Body mass index is 23.83 kg/m .  Physical Exam  Vitals signs reviewed.   Constitutional:       General: She is not in acute distress.     Appearance: She is well-developed. She is not diaphoretic.   HENT:      Head: Normocephalic.   Eyes:      General: No scleral icterus.     Conjunctiva/sclera: Conjunctivae normal.   Neck:      Musculoskeletal: Normal range of motion.      Thyroid: No thyromegaly.   Cardiovascular:      Rate and Rhythm: Normal rate and regular rhythm.      Heart sounds: Normal heart sounds. No murmur.   Pulmonary:      Effort: Pulmonary effort is normal. No respiratory distress.      Breath sounds: Normal breath sounds. No wheezing.   Abdominal:      General: Bowel sounds are normal. There is no distension.      Palpations: Abdomen is soft. There is no hepatomegaly or splenomegaly.      Tenderness: There is no abdominal tenderness.   Lymphadenopathy:      Cervical: No cervical adenopathy.   Skin:     General: Skin is warm and dry.   Neurological:      Mental Status: She is alert and oriented to person, place, and time.   Psychiatric:         Behavior: Behavior normal.         Thought Content: Thought content " normal.         Judgment: Judgment normal.

## 2021-04-26 ENCOUNTER — OFFICE VISIT (OUTPATIENT)
Dept: FAMILY MEDICINE | Facility: CLINIC | Age: 86
End: 2021-04-26
Payer: COMMERCIAL

## 2021-04-26 VITALS
HEIGHT: 59 IN | SYSTOLIC BLOOD PRESSURE: 143 MMHG | BODY MASS INDEX: 23.79 KG/M2 | WEIGHT: 118 LBS | DIASTOLIC BLOOD PRESSURE: 87 MMHG | TEMPERATURE: 98.8 F | HEART RATE: 104 BPM | OXYGEN SATURATION: 100 %

## 2021-04-26 DIAGNOSIS — R29.6 FALLS FREQUENTLY: ICD-10-CM

## 2021-04-26 DIAGNOSIS — I10 ESSENTIAL HYPERTENSION, BENIGN: Primary | ICD-10-CM

## 2021-04-26 DIAGNOSIS — J45.40 MODERATE PERSISTENT ASTHMA WITHOUT COMPLICATION: ICD-10-CM

## 2021-04-26 PROCEDURE — 99214 OFFICE O/P EST MOD 30 MIN: CPT | Performed by: FAMILY MEDICINE

## 2021-04-26 ASSESSMENT — ASTHMA QUESTIONNAIRES
QUESTION_1 LAST FOUR WEEKS HOW MUCH OF THE TIME DID YOUR ASTHMA KEEP YOU FROM GETTING AS MUCH DONE AT WORK, SCHOOL OR AT HOME: NONE OF THE TIME
QUESTION_5 LAST FOUR WEEKS HOW WOULD YOU RATE YOUR ASTHMA CONTROL: COMPLETELY CONTROLLED
QUESTION_4 LAST FOUR WEEKS HOW OFTEN HAVE YOU USED YOUR RESCUE INHALER OR NEBULIZER MEDICATION (SUCH AS ALBUTEROL): NOT AT ALL
ACT_TOTALSCORE: 25
QUESTION_2 LAST FOUR WEEKS HOW OFTEN HAVE YOU HAD SHORTNESS OF BREATH: NOT AT ALL
ACUTE_EXACERBATION_TODAY: NO
QUESTION_3 LAST FOUR WEEKS HOW OFTEN DID YOUR ASTHMA SYMPTOMS (WHEEZING, COUGHING, SHORTNESS OF BREATH, CHEST TIGHTNESS OR PAIN) WAKE YOU UP AT NIGHT OR EARLIER THAN USUAL IN THE MORNING: NOT AT ALL

## 2021-04-26 ASSESSMENT — MIFFLIN-ST. JEOR: SCORE: 870.87

## 2021-04-26 NOTE — PATIENT INSTRUCTIONS
Here is the plan from today's visit    1. Essential hypertension, benign goal <150  controlled    2. Moderate persistent asthma without complication  controlled    3. Falls frequently  No picking up things on       Please call or return to clinic if your symptoms don't go away.    Follow up plan  Return in about 2 months (around 6/26/2021).     Thank you for coming to [facility] today.  Lab Testing:  **If you had lab testing today and your results are reassuring or normal they will be mailed to you or sent through [employer] within 7 days.   **If the lab tests need quick action we will call you with the results.  The phone number we will call with results is # [phone] (home) . If this is not the best number please call our clinic and change the number.  Medication Refills:  If you need any refills please call your pharmacy and they will contact us.   If you need to  your refill at a new pharmacy, please contact the new pharmacy directly. The new pharmacy will help you get your medications transferred faster.   Scheduling:  If you have any concerns about today's visit or wish to schedule another appointment please call our office during normal business hours [phone] (8-5:00 M-F)   eferrals to [facility] please call [phone].   Mammogram Scheduling [phone]     XRay/CT/Ultrasound/MRI Scheduling [phone]    Medical Concerns:  If you have urgent medical concerns please call [phone] at any time of the day.    Martin Walton MD

## 2021-04-27 ENCOUNTER — TELEPHONE (OUTPATIENT)
Dept: FAMILY MEDICINE | Facility: CLINIC | Age: 86
End: 2021-04-27

## 2021-04-27 ASSESSMENT — ASTHMA QUESTIONNAIRES: ACT_TOTALSCORE: 25

## 2021-04-27 NOTE — TELEPHONE ENCOUNTER
[facility] patient. Wanted to update you to let you know that patient has declined all home care services at this time. We will be canceling referral d/t patients request.  Thank you!  Tawny Terrazas Rn

## 2021-05-26 VITALS
SYSTOLIC BLOOD PRESSURE: 146 MMHG | RESPIRATION RATE: 18 BRPM | TEMPERATURE: 96.1 F | DIASTOLIC BLOOD PRESSURE: 78 MMHG | HEART RATE: 71 BPM | OXYGEN SATURATION: 96 %

## 2021-06-04 VITALS
WEIGHT: 125.8 LBS | HEIGHT: 59 IN | SYSTOLIC BLOOD PRESSURE: 139 MMHG | OXYGEN SATURATION: 94 % | RESPIRATION RATE: 18 BRPM | DIASTOLIC BLOOD PRESSURE: 74 MMHG | HEART RATE: 74 BPM | BODY MASS INDEX: 25.36 KG/M2 | TEMPERATURE: 97.3 F

## 2021-06-04 VITALS
WEIGHT: 125.8 LBS | SYSTOLIC BLOOD PRESSURE: 152 MMHG | TEMPERATURE: 96.2 F | OXYGEN SATURATION: 99 % | BODY MASS INDEX: 25.41 KG/M2 | RESPIRATION RATE: 18 BRPM | HEART RATE: 88 BPM | DIASTOLIC BLOOD PRESSURE: 94 MMHG

## 2021-06-04 VITALS
RESPIRATION RATE: 19 BRPM | BODY MASS INDEX: 25.36 KG/M2 | HEART RATE: 84 BPM | OXYGEN SATURATION: 98 % | TEMPERATURE: 95.1 F | HEIGHT: 59 IN | WEIGHT: 125.8 LBS | DIASTOLIC BLOOD PRESSURE: 70 MMHG | SYSTOLIC BLOOD PRESSURE: 116 MMHG

## 2021-06-04 VITALS
BODY MASS INDEX: 25.36 KG/M2 | HEIGHT: 59 IN | DIASTOLIC BLOOD PRESSURE: 99 MMHG | HEART RATE: 83 BPM | SYSTOLIC BLOOD PRESSURE: 134 MMHG | TEMPERATURE: 97.1 F | WEIGHT: 125.8 LBS | RESPIRATION RATE: 19 BRPM | OXYGEN SATURATION: 98 %

## 2021-06-04 VITALS
RESPIRATION RATE: 18 BRPM | HEART RATE: 80 BPM | WEIGHT: 125.8 LBS | BODY MASS INDEX: 25.36 KG/M2 | TEMPERATURE: 98.9 F | DIASTOLIC BLOOD PRESSURE: 60 MMHG | HEIGHT: 59 IN | OXYGEN SATURATION: 97 % | SYSTOLIC BLOOD PRESSURE: 102 MMHG

## 2021-06-05 NOTE — PROGRESS NOTES
[facility]      Facility:    [facility] [[id number]]    Code Status: DNR   [facility]  through 2020      Chief Complaint/Reason for Visit:  Chief Complaint   Patient presents with     H & P     WEAKNESS / uti       HPI:   Brooke is a 87 y.o. female with history of asthma, hypertension, hypothyroidism, peripheral neuropathy, hyperlipidemia.  She lives in with her  who is blind.  On the day of admission to [facility], she was in her kitchen when she suddenly became weak, and her legs came gave out.  They called a neighbor to assist her back onto the couch, but she was unable to get up due to leg weakness and was brought to the emergency department.    She was hypertensive = 182/101.  Potassium was a bit low at 3.3.  UA was suspicious for infection she was started on ceftriaxone.  Culture ultimately grew E. coli.    She was recommended to go to TCU and went to [facility].  She was discharged on cefdinir for an additional 3 days.    Past Medical History:  Past Medical History:   Diagnosis Date     Asthma      HLD (hyperlipidemia)      HTN (hypertension)      Hypokalemia      Hypothyroidism      UTI (urinary tract infection)      Weakness of both lower extremities            Surgical History:  Past Surgical History:   Procedure Laterality Date     TONSILLECTOMY         Family History:   Family History   Problem Relation Age of Onset     Melanoma Mother      Cancer Father         colorectal      Coronary artery disease Father      Breast cancer Sister        Social History:    Social History     Socioeconomic History     Marital status:  second time     Spouse name: Not on file     Number of children: 2, both  of SIDS     Years of education: Not on file     Highest education level: Not on file   Occupational History     Not on file   Social Needs     Financial resource strain: Not on file     Food insecurity:     Worry: Not on  file     Inability: Not on file     Transportation needs:     Medical: Not on file     Non-medical: Not on file   Tobacco Use     Smoking status: Never Smoker     Smokeless tobacco: Never Used   Substance and Sexual Activity     Alcohol use: Not Currently     Drug use: Not on file     Sexual activity: Not on file   Lifestyle     Physical activity:     Days per week: Not on file     Minutes per session: Not on file     Stress: Not on file   Relationships     Social connections:     Talks on phone: Not on file     Gets together: Not on file     Attends [employer] service: Not on file     Active member of club or organization: Not on file     Attends meetings of clubs or organizations: Not on file     Relationship status: Not on file       Review of Systems   She uses a tripod cane at home, but would like a walker for discharge at home for more stability  Has baseline urinary incontinence  Feeling much stronger  The remainder of the comprehensive review of systems is negative    Blood pressure (!) 152/94, pulse 88, temperature (!) 96.2  F (35.7  C), resp. rate 18, weight 125 lb 12.8 oz (57.1 kg), SpO2 99 %.        Physical Exam  Constitutional:       General: She is not in acute distress.     Appearance: She is normal weight.      Comments: Thin, elderly  female who acts more youthful than her stated age   HENT:      Nose: Nose normal.      Mouth/Throat:      Mouth: Mucous membranes are moist.   Eyes:      Extraocular Movements: Extraocular movements intact.      Conjunctiva/sclera: Conjunctivae normal.   Neck:      Musculoskeletal: No neck rigidity.   Cardiovascular:      Rate and Rhythm: Normal rate and regular rhythm.      Heart sounds: No murmur.   Pulmonary:      Breath sounds: Normal breath sounds. No wheezing or rales.   Abdominal:      General: Bowel sounds are normal.      Palpations: Abdomen is soft.      Tenderness: There is no abdominal tenderness.   Musculoskeletal:      Comments: Hand and foot  arthritic changes  Normal strength, age-appropriate range of motion   Skin:     General: Skin is warm and dry.      Comments: Plantar callus under the right fifth MTP  Thick callus under the left fifth plantar, third left toe tip.  Bruises on the left dorsal MTPs from her fall   Neurological:      General: No focal deficit present.      Mental Status: She is oriented to person, place, and time.      Motor: No weakness.      Gait: Gait normal.   Psychiatric:         Mood and Affect: Mood normal.         Thought Content: Thought content normal.         Judgment: Judgment normal.           Allergies   Allergen Reactions     Alendronate      Atorvastatin      Cetirizine-Pseudoephedrine      Niacin      Omega-3 Acid Ethyl Esters        Medication List:  Current Outpatient Medications   Medication Sig     albuterol (PROAIR HFA;PROVENTIL HFA;VENTOLIN HFA) 90 mcg/actuation inhaler Inhale 2 puffs 4 (four) times a day as needed.     aspirin 325 MG tablet Take 325 mg by mouth daily.     CALCIUM CITRATE ORAL Take 1,200 mg by mouth daily with supper.     cholecalciferol, vitamin D3, 1,000 unit (25 mcg) tablet Take 1,000 Units by mouth daily.     coenzyme Q10 200 mg capsule Take 200 mg by mouth daily.     fluticasone propion-salmeterol (ADVAIR) 100-50 mcg/dose DISKUS Inhale 1 puff 2 (two) times a day.     krtuqykl-kdkm-zkd5-C-dany-bosw (OSTEO BI-FLEX TRIPLE STRENGTH) 750 mg-644 mg- 30 mg-1 mg Tab Take 1 tablet by mouth daily.     hydroCHLOROthiazide (HYDRODIURIL) 25 MG tablet Take 25 mg by mouth daily.     levothyroxine (SYNTHROID, LEVOTHROID) 50 MCG tablet Take 50 mcg by mouth daily.     multivit-min-FA-lycopen-lutein (CENTRUM SILVER) 0.4-300-250 mg-mcg-mcg tablet Take 1 tablet by mouth daily.     potassium chloride (KLOR-CON) 10 MEQ CR tablet Take 10 mEq by mouth daily.     simvastatin (ZOCOR) 20 MG tablet Take 20 mg by mouth daily with supper.       Labs:   Ref Range & Units 1/31/20 1000     Sodium 136 - 145 mmol/L 140      Potassium 3.5 - 5.0 mmol/L 3.9     Chloride 98 - 107 mmol/L 103     CO2 22 - 31 mmol/L 26     Anion Gap, Calculation 5 - 18 mmol/L 11     Glucose 70 - 125 mg/dL 69Low      Calcium 8.5 - 10.5 mg/dL 9.7     BUN 8 - 28 mg/dL 26     Creatinine 0.60 - 1.10 mg/dL 1.09     GFR MDRD Non Af Amer >60 mL/min/1.73m2 47Low        Ref Range & Units 1/23/20 1813   ALBUMIN 3.2 - 4.6 g/dL 4.1    PROTEIN,TOTAL 6.0 - 8.0 g/dL 7.3    GLOBULIN                  2.0 - 3.7 g/dL 3.2    A/G RATIO 1.0 - 2.0  1.3    BILIRUBIN,TOTAL 0.2 - 1.2 mg/dL 1.1    BILIRUBIN,DIRECT 0.1 - 0.5 mg/dL 0.4    BILIRUBIN,INDIRECT        0.2 - 0.8 mg/dL 0.7    ALK PHOSPHATASE 50 - 136 IU/L 78    ALT (SGPT) 8 - 45 IU/L 18    AST (SGOT) 2 - 40 IU/L 28       Ref Range & Units 1/23/20 1813   WHITE BLOOD COUNT         4.5 - 11.0 thou/cu mm 6.0    HEMOGLOBIN                12.0 - 16.0 g/dL 13.9    RDW                       11.5 - 15.5 % 13.6    PLATELET COUNT            140 - 440 thou/cu mm 208          Assessment / Plan:    ICD-10-CM    1. Cystitis N30.90  completed Cefdinir   2. Weakness of both lower extremities R29.898  improving with therapy   3. CKD (chronic kidney disease) stage 3, GFR 30-59 ml/min (H) N18.3    4. Essential hypertension I10  was running quite high in the hospital, permissive hypertension due to age.  Only on hydrochlorothiazide, may need other agents added   5. Hypothyroidism, unspecified type E03.9  on replacement   6. Asthma, unspecified asthma severity, unspecified whether complicated, unspecified whether persistent J45.909  continue Advair, as needed albuterol. No exacerbation at present   7. Hereditary and idiopathic peripheral neuropathy G60.9 Walker would be helpful     On a multitude of supplements      Electronically signed by: Nancy Perez MD

## 2021-06-05 NOTE — PROGRESS NOTES
[facility]    Name:   Brooke Xie  : 1933  Facility:   [facility] [[id number]]   Room:   Code Status: DNR and POLST AVAILABLE -   Fac type:   SNF (Skilled Nursing Facility, TCU) -     PCP:  Martin Walton MD    CHIEF COMPLAINT / REASON FOR VISIT:  Chief Complaint   Patient presents with     Follow-up     TCU follow-up after hospitalization for lower extremity weakness and UTI.      [facility] from 2020 until 2020 (lower extremity weakness)      HPI: Brooke is a 87 y.o. female with a history of hypertension, hereditary idiopathic peripheral neuropathy, hypothyroidism, asthma, hiatal hernia, and hyperlipidemia, he was admitted to [facility] on 2020 after presenting to the ED for evaluation of leg weakness.  On the day of admission, she had been standing in the kitchen when her legs became suddenly weak, and she fell.  Her  was present but unable to catch her.    In the ED, she was hypertensive to 182/101.  Labs remarkable for potassium 3.3 and UA consistent with infection, with culture growing E. coli.  She had been treated empirically with ceftriaxone and later switched to cefdinir on discharge to complete a 7-day course.  She was seen by PT/OT with recommendations for TCU and transferred here.      TCU ISSUES    Today: She seems to be doing okay.  No pain to speak of except for some neuropathy in both legs from the knees to the toes (worse on the right).  Sometimes the legs bother her at night, and she kicks a lot.    She has a positive outlook towards therapy.    Urinary tract infection: Asymptomatic.  Antibiotics are completing.    ROS: No headaches or chest pains, coughing or congestion, nausea or vomiting, dizziness or dyspnea, dysuria, diplopia, constipation or diarrhea, difficulty chewing or swallowing, or any integumentary issues.  Appetite is good, and she is sleeping well.    Past Medical History:   Diagnosis  Date     Asthma      HLD (hyperlipidemia)      HTN (hypertension)      Hypokalemia      Hypothyroidism      UTI (urinary tract infection)      Weakness of both lower extremities               Family History   Problem Relation Age of Onset     Melanoma Mother      Cancer Father         colorectal      Coronary artery disease Father      Breast cancer Sister      Social History     Socioeconomic History     Marital status:      Spouse name: Not on file     Number of children: Not on file     Years of education: Not on file     Highest education level: Not on file   Occupational History     Not on file   Social Needs     Financial resource strain: Not on file     Food insecurity:     Worry: Not on file     Inability: Not on file     Transportation needs:     Medical: Not on file     Non-medical: Not on file   Tobacco Use     Smoking status: Never Smoker     Smokeless tobacco: Never Used   Substance and Sexual Activity     Alcohol use: Not Currently     Drug use: Not on file     Sexual activity: Not on file   Lifestyle     Physical activity:     Days per week: Not on file     Minutes per session: Not on file     Stress: Not on file   Relationships     Social connections:     Talks on phone: Not on file     Gets together: Not on file     Attends Orthodox service: Not on file     Active member of club or organization: Not on file     Attends meetings of clubs or organizations: Not on file     Relationship status: Not on file     Intimate partner violence:     Fear of current or ex partner: Not on file     Emotionally abused: Not on file     Physically abused: Not on file     Forced sexual activity: Not on file   Other Topics Concern     Not on file   Social History Narrative     Not on file       MEDICATIONS: Reviewed from the MAR, physician orders, and/or earlier progress notes.  Post Discharge Medication Reconciliation Status: discharge medications reconciled, continue medications without change.  Current  "Outpatient Medications   Medication Sig     albuterol (PROAIR HFA;PROVENTIL HFA;VENTOLIN HFA) 90 mcg/actuation inhaler Inhale 2 puffs 4 (four) times a day as needed.     aspirin 325 MG tablet Take 325 mg by mouth daily.     CALCIUM CITRATE ORAL Take 1,200 mg by mouth daily with supper.     cholecalciferol, vitamin D3, 1,000 unit (25 mcg) tablet Take 1,000 Units by mouth daily.     coenzyme Q10 200 mg capsule Take 200 mg by mouth daily.     fluticasone propion-salmeterol (ADVAIR) 100-50 mcg/dose DISKUS Inhale 1 puff 2 (two) times a day.     vdhseikf-yjvn-nxv1-C-dany-bosw (OSTEO BI-FLEX TRIPLE STRENGTH) 750 mg-644 mg- 30 mg-1 mg Tab Take 1 tablet by mouth daily.     hydroCHLOROthiazide (HYDRODIURIL) 25 MG tablet Take 25 mg by mouth daily.     levothyroxine (SYNTHROID, LEVOTHROID) 50 MCG tablet Take 50 mcg by mouth daily.     multivit-min-FA-lycopen-lutein (CENTRUM SILVER) 0.4-300-250 mg-mcg-mcg tablet Take 1 tablet by mouth daily.     potassium chloride (KLOR-CON) 10 MEQ CR tablet Take 10 mEq by mouth daily.     simvastatin (ZOCOR) 20 MG tablet Take 20 mg by mouth daily with supper.     ALLERGIES:   Allergies   Allergen Reactions     Alendronate      Atorvastatin      Cetirizine-Pseudoephedrine      Niacin      Omega-3 Acid Ethyl Esters      DIET: Regular, regular texture, thin liquids.    Vitals:    01/29/20 1409   BP: (!) 134/99   Pulse: 83   Resp: 19   Temp: 97.1  F (36.2  C)   SpO2: 98%   Weight: 125 lb 12.8 oz (57.1 kg)   Height: 4' 11\" (1.499 m)     Body mass index is 25.41 kg/m .    EXAMINATION:   General: Pleasant, alert, and conversant elderly female, lying in bed, in no apparent distress.  Head: Normocephalic and atraumatic.   Eyes: PERRLA, sclerae clear.   ENT: Moist oral mucosa.  She has her own teeth.  No rhinorrhea or nasal discharge.  Hearing seems unimpaired.  Cardiovascular: Regular rate and rhythm with a 3/6 DAISY at the LSB.   Respiratory: Lungs clear to auscultation bilaterally.   Abdomen: Soft and " nontender.   Musculoskeletal/Extremities: Age-related degenerative joint disease.  Trace lower extremity edema.  Neurologic: Weakness of bilateral lower extremities.  Integument: No rashes, clinically significant lesions, or skin breakdown.   Cognitive/Psychiatric: Alert and oriented x4.  Affect is euthymic.    DIAGNOSTICS:   No results found for this or any previous visit.  No results found for: WBC, HGB, HCT, MCV, PLT  CrCl cannot be calculated (No successful lab value found.).      ASSESSMENT/Plan:      ICD-10-CM    1. Weakness of both lower extremities R29.898    2. Essential hypertension I10    3. Hereditary and idiopathic peripheral neuropathy G60.9    4. Asthma, unspecified asthma severity, unspecified whether complicated, unspecified whether persistent J45.909    5. Diaphragmatic hernia without obstruction and without gangrene K44.9    6. Hypothyroidism, unspecified type E03.9    7. Allergic rhinitis, unspecified seasonality, unspecified trigger J30.9    8. Hyperlipidemia, unspecified hyperlipidemia type E78.5      CHANGES:    None.    CARE PLAN:    The care plan has been reviewed and all orders signed. Changes to care plan, if any, as noted. Otherwise, continue current plan of care.    The above has been created using voice recognition software. Please be aware that this may unintentionally  produce inaccuracies and/or nonsensical sentences.      Electronically signed by: Srinath Ballard CNP

## 2021-06-05 NOTE — PROGRESS NOTES
[facility]    Name:   Brooke Xie  : 1933  Facility:   [facility] [[id number]]   Room:   Code Status: DNR and POLST AVAILABLE -   Fac type:   SNF (Skilled Nursing Facility, TCU) -     PCP:  Martin Walton MD    CHIEF COMPLAINT / REASON FOR VISIT:  Chief Complaint   Patient presents with     Follow-up     TCU follow-up after hospitalization for lower extremity weakness and UTI.      [facility] from 2020 until 2020 (lower extremity weakness)    Patient was last seen by me on 2020 and subsequently seen by Dr. Perez on 2020.      HPI: Brooke is a 87 y.o. female with a history of hypertension, hereditary idiopathic peripheral neuropathy, hypothyroidism, asthma, hiatal hernia, and hyperlipidemia, he was admitted to [facility] on 2020 after presenting to the ED for evaluation of leg weakness.  On the day of admission, she had been standing in the kitchen when her legs became suddenly weak, and she fell.  Her  was present but unable to catch her.    In the ED, she was hypertensive to 182/101.  Labs remarkable for potassium 3.3 and UA consistent with infection, with culture growing E. coli.  She had been treated empirically with ceftriaxone and later switched to cefdinir on discharge to complete a 7-day course.  She was seen by PT/OT with recommendations for TCU and transferred here.      TCU ISSUES    Today: She seems to be doing okay.  She tells me her legs are doing fine.  No problems ambulating with the walker, and she will be having one at home when she discharges.  No pain to speak of except for some neuropathy in both legs from the knees to the toes (worse on the right), a chronic condition that she has lived with for years.  Sometimes the legs bother her at night, and she kicks a lot.    Urinary tract infection: Asymptomatic.  Antibiotics completed.    Discharge planning: She has a care conference on  Thursday.  I know that she is anxious to go home.    ROS: No headaches or chest pains, coughing or congestion, nausea or vomiting, dizziness or dyspnea, dysuria, diplopia, constipation or diarrhea, difficulty chewing or swallowing, or any integumentary issues.  Appetite is good, and she is sleeping well.    Past Medical History:   Diagnosis Date     Asthma      CRI (chronic renal insufficiency), stage 3 (moderate) (H) 2/12/2020     HLD (hyperlipidemia)      HTN (hypertension)      Hypokalemia      Hypothyroidism      UTI (urinary tract infection)      Weakness of both lower extremities               Family History   Problem Relation Age of Onset     Melanoma Mother      Cancer Father         colorectal      Coronary artery disease Father      Breast cancer Sister      Social History     Socioeconomic History     Marital status:      Spouse name: Not on file     Number of children: Not on file     Years of education: Not on file     Highest education level: Not on file   Occupational History     Not on file   Social Needs     Financial resource strain: Not on file     Food insecurity:     Worry: Not on file     Inability: Not on file     Transportation needs:     Medical: Not on file     Non-medical: Not on file   Tobacco Use     Smoking status: Never Smoker     Smokeless tobacco: Never Used   Substance and Sexual Activity     Alcohol use: Not Currently     Drug use: Not on file     Sexual activity: Not on file   Lifestyle     Physical activity:     Days per week: Not on file     Minutes per session: Not on file     Stress: Not on file   Relationships     Social connections:     Talks on phone: Not on file     Gets together: Not on file     Attends Druze service: Not on file     Active member of club or organization: Not on file     Attends meetings of clubs or organizations: Not on file     Relationship status: Not on file     Intimate partner violence:     Fear of current or ex partner: Not on file      "Emotionally abused: Not on file     Physically abused: Not on file     Forced sexual activity: Not on file   Other Topics Concern     Not on file   Social History Narrative     Not on file       MEDICATIONS: Reviewed from the MAR, physician orders, and/or earlier progress notes.  Post Discharge Medication Reconciliation Status: medication reconciliation previously completed during another office visit.  Current Outpatient Medications   Medication Sig     albuterol (PROAIR HFA;PROVENTIL HFA;VENTOLIN HFA) 90 mcg/actuation inhaler Inhale 2 puffs 4 (four) times a day as needed.     aspirin 325 MG tablet Take 325 mg by mouth daily.     CALCIUM CITRATE ORAL Take 1,200 mg by mouth daily with supper.     cholecalciferol, vitamin D3, 1,000 unit (25 mcg) tablet Take 1,000 Units by mouth daily.     coenzyme Q10 200 mg capsule Take 200 mg by mouth daily.     fluticasone propion-salmeterol (ADVAIR) 100-50 mcg/dose DISKUS Inhale 1 puff 2 (two) times a day.     skszwmxu-ussx-nef7-C-dany-bosw (OSTEO BI-FLEX TRIPLE STRENGTH) 750 mg-644 mg- 30 mg-1 mg Tab Take 1 tablet by mouth daily.     hydroCHLOROthiazide (HYDRODIURIL) 25 MG tablet Take 25 mg by mouth daily.     levothyroxine (SYNTHROID, LEVOTHROID) 50 MCG tablet Take 50 mcg by mouth daily.     multivit-min-FA-lycopen-lutein (CENTRUM SILVER) 0.4-300-250 mg-mcg-mcg tablet Take 1 tablet by mouth daily.     potassium chloride (KLOR-CON) 10 MEQ CR tablet Take 10 mEq by mouth daily.     simvastatin (ZOCOR) 20 MG tablet Take 20 mg by mouth daily with supper.     ALLERGIES:   Allergies   Allergen Reactions     Alendronate      Atorvastatin      Cetirizine-Pseudoephedrine      Niacin      Omega-3 Acid Ethyl Esters      DIET: Regular, regular texture, thin liquids.    Vitals:    02/03/20 1459   BP: 139/74   Pulse: 74   Resp: 18   Temp: 97.3  F (36.3  C)   SpO2: 94%   Weight: 125 lb 12.8 oz (57.1 kg)   Height: 4' 11\" (1.499 m)     Body mass index is 25.41 kg/m .    EXAMINATION:   General: " Pleasant, alert, and conversant elderly female, lying in bed, in no apparent distress.  Head: Normocephalic and atraumatic.   Eyes: PERRLA, sclerae clear.   ENT: Moist oral mucosa.  She has her own teeth.  No rhinorrhea or nasal discharge.  Hearing seems unimpaired.  Cardiovascular: Regular rate and rhythm.  I could not appreciate any murmur today.   Respiratory: Lungs clear to auscultation bilaterally.   Abdomen: Soft and nontender.   Musculoskeletal/Extremities: Age-related degenerative joint disease.  Trace lower extremity edema.  Neurologic: Weakness of bilateral lower extremities.  Integument: No rashes, clinically significant lesions, or skin breakdown.   Cognitive/Psychiatric: Alert and oriented x4.  Affect is euthymic.    DIAGNOSTICS:   Results for orders placed or performed in visit on 01/31/20   Basic Metabolic Panel   Result Value Ref Range    Sodium 140 136 - 145 mmol/L    Potassium 3.9 3.5 - 5.0 mmol/L    Chloride 103 98 - 107 mmol/L    CO2 26 22 - 31 mmol/L    Anion Gap, Calculation 11 5 - 18 mmol/L    Glucose 69 (L) 70 - 125 mg/dL    Calcium 9.7 8.5 - 10.5 mg/dL    BUN 26 8 - 28 mg/dL    Creatinine 1.09 0.60 - 1.10 mg/dL    GFR MDRD Af Amer 58 (L) >60 mL/min/1.73m2    GFR MDRD Non Af Amer 47 (L) >60 mL/min/1.73m2     No results found for: WBC, HGB, HCT, MCV, PLT  CrCl cannot be calculated (Patient's most recent lab result is older than the maximum 5 days allowed.).      ASSESSMENT/Plan:      ICD-10-CM    1. Weakness of both lower extremities R29.898    2. Essential hypertension I10    3. Hereditary and idiopathic peripheral neuropathy G60.9    4. CRI (chronic renal insufficiency), stage 3 (moderate) (H) N18.3    5. Asthma, unspecified asthma severity, unspecified whether complicated, unspecified whether persistent J45.909    6. Diaphragmatic hernia without obstruction and without gangrene K44.9    7. Hypothyroidism, unspecified type E03.9    8. Allergic rhinitis, unspecified seasonality, unspecified  trigger J30.9    9. Hyperlipidemia, unspecified hyperlipidemia type E78.5      CHANGES:    None.    CARE PLAN:    The care plan has been reviewed and all orders signed. Changes to care plan, if any, as noted. Otherwise, continue current plan of care.    The above has been created using voice recognition software. Please be aware that this may unintentionally  produce inaccuracies and/or nonsensical sentences.      Electronically signed by: Srinath Ballard CNP

## 2021-06-06 NOTE — PROGRESS NOTES
"[facility]    Name:   Brooke Xie  : 1933  Facility:   [facility] [[id number]]   Room:   Code Status: DNR and POLST AVAILABLE -   Fac type:   SNF (Skilled Nursing Facility, TCU) -     PCP:  Martin Walton MD    CHIEF COMPLAINT / REASON FOR VISIT:  Chief Complaint   Patient presents with     Discharge Summary     TCU discharge after hospitalization for lower extremity weakness and UTI.      [facility] from 2020 until 2020 (lower extremity weakness)  [facility] from 2020 until 2020 (expected discharge date)      HPI: Brooke is a 87 y.o. female with a history of hypertension, hereditary idiopathic peripheral neuropathy, hypothyroidism, asthma, hiatal hernia, and hyperlipidemia, he was admitted to [facility] on 2020 after presenting to the ED for evaluation of leg weakness.  On the day of admission, she had been standing in the kitchen when her legs became suddenly weak, and she fell.  Her  was present but unable to catch her.    In the ED, she was hypertensive to 182/101.  Labs remarkable for potassium 3.3 and UA consistent with infection, with culture growing E. coli.  She had been treated empirically with ceftriaxone and later switched to cefdinir on discharge to complete a 7-day course.  She was seen by PT/OT with recommendations for TCU and transferred here.      TCU ISSUES    Summary: She has done well.  No pain to speak of except for some neuropathy that she describes as \"buzzing\" in both her legs at night, with some nights worse than others.  She tells me that \"the neuropathy is always the same.\"  Nonetheless, she does sleep well.    Discharge planning: She did well with therapy and recently got a new 2 wheeled walker.  She will discharge to home on 2020.  She declined any home PT.    Urinary tract infection: Asymptomatic.  Antibiotics completed.    Chronic renal insufficiency: " Stage III, based on labs from 01/31/2020.  Not previously listed in her history.    ROS: No headaches or chest pains, coughing or congestion, nausea or vomiting, dizziness or dyspnea, dysuria, diplopia, constipation or diarrhea, difficulty chewing or swallowing, or any integumentary issues.  Appetite is good, and she is sleeping well.    Past Medical History:   Diagnosis Date     Asthma      CRI (chronic renal insufficiency), stage 3 (moderate) (H) 2/12/2020     HLD (hyperlipidemia)      HTN (hypertension)      Hypokalemia      Hypothyroidism      UTI (urinary tract infection)      Weakness of both lower extremities               Family History   Problem Relation Age of Onset     Melanoma Mother      Cancer Father         colorectal      Coronary artery disease Father      Breast cancer Sister      Social History     Socioeconomic History     Marital status:      Spouse name: Not on file     Number of children: Not on file     Years of education: Not on file     Highest education level: Not on file   Occupational History     Not on file   Social Needs     Financial resource strain: Not on file     Food insecurity:     Worry: Not on file     Inability: Not on file     Transportation needs:     Medical: Not on file     Non-medical: Not on file   Tobacco Use     Smoking status: Never Smoker     Smokeless tobacco: Never Used   Substance and Sexual Activity     Alcohol use: Not Currently     Drug use: Not on file     Sexual activity: Not on file   Lifestyle     Physical activity:     Days per week: Not on file     Minutes per session: Not on file     Stress: Not on file   Relationships     Social connections:     Talks on phone: Not on file     Gets together: Not on file     Attends Synagogue service: Not on file     Active member of club or organization: Not on file     Attends meetings of clubs or organizations: Not on file     Relationship status: Not on file     Intimate partner violence:     Fear of current  "or ex partner: Not on file     Emotionally abused: Not on file     Physically abused: Not on file     Forced sexual activity: Not on file   Other Topics Concern     Not on file   Social History Narrative     Not on file       MEDICATIONS: Reviewed from the MAR, physician orders, and/or earlier progress notes.  Post Discharge Medication Reconciliation Status: medication reconciliation previously completed during another office visit.  Current Outpatient Medications   Medication Sig     albuterol (PROAIR HFA;PROVENTIL HFA;VENTOLIN HFA) 90 mcg/actuation inhaler Inhale 2 puffs 4 (four) times a day as needed.     aspirin 325 MG tablet Take 325 mg by mouth daily.     CALCIUM CITRATE ORAL Take 1,200 mg by mouth daily with supper.     cholecalciferol, vitamin D3, 1,000 unit (25 mcg) tablet Take 1,000 Units by mouth daily.     coenzyme Q10 200 mg capsule Take 200 mg by mouth daily.     fluticasone propion-salmeterol (ADVAIR) 100-50 mcg/dose DISKUS Inhale 1 puff 2 (two) times a day.     wxaixjtr-ebkx-mtm2-C-dany-bosw (OSTEO BI-FLEX TRIPLE STRENGTH) 750 mg-644 mg- 30 mg-1 mg Tab Take 1 tablet by mouth daily.     hydroCHLOROthiazide (HYDRODIURIL) 25 MG tablet Take 25 mg by mouth daily.     levothyroxine (SYNTHROID, LEVOTHROID) 50 MCG tablet Take 50 mcg by mouth daily.     multivit-min-FA-lycopen-lutein (CENTRUM SILVER) 0.4-300-250 mg-mcg-mcg tablet Take 1 tablet by mouth daily.     potassium chloride (KLOR-CON) 10 MEQ CR tablet Take 10 mEq by mouth daily.     simvastatin (ZOCOR) 20 MG tablet Take 20 mg by mouth daily with supper.     ALLERGIES:   Allergies   Allergen Reactions     Alendronate      Atorvastatin      Cetirizine-Pseudoephedrine      Niacin      Omega-3 Acid Ethyl Esters      DIET: Regular, regular texture, thin liquids.    Vitals:    02/12/20 1455   BP: 116/70   Pulse: 84   Resp: 19   Temp: (!) 95.1  F (35.1  C)   SpO2: 98%   Weight: 125 lb 12.8 oz (57.1 kg)   Height: 4' 11\" (1.499 m)     Body mass index is 25.41 " kg/m .    EXAMINATION:   General: Pleasant, alert, and conversant elderly female, lying in bed, in no apparent distress.  Head: Normocephalic and atraumatic.   Eyes: PERRLA, sclerae clear.   ENT: Moist oral mucosa.  She has her own teeth.  No rhinorrhea or nasal discharge.  Hearing seems unimpaired.  Cardiovascular: Regular rate and rhythm with a 3/6 DAISY at the LSB.   Respiratory: Lungs clear to auscultation bilaterally.   Abdomen: Soft and nontender.   Musculoskeletal/Extremities: Age-related degenerative joint disease.  Trace lower extremity edema.  Neurologic: Weakness of bilateral lower extremities.  Integument: No rashes, clinically significant lesions, or skin breakdown.   Cognitive/Psychiatric: Alert and oriented x4.  Affect is euthymic.    DIAGNOSTICS:   Results for orders placed or performed in visit on 01/31/20   Basic Metabolic Panel   Result Value Ref Range    Sodium 140 136 - 145 mmol/L    Potassium 3.9 3.5 - 5.0 mmol/L    Chloride 103 98 - 107 mmol/L    CO2 26 22 - 31 mmol/L    Anion Gap, Calculation 11 5 - 18 mmol/L    Glucose 69 (L) 70 - 125 mg/dL    Calcium 9.7 8.5 - 10.5 mg/dL    BUN 26 8 - 28 mg/dL    Creatinine 1.09 0.60 - 1.10 mg/dL    GFR MDRD Af Amer 58 (L) >60 mL/min/1.73m2    GFR MDRD Non Af Amer 47 (L) >60 mL/min/1.73m2     No results found for: WBC, HGB, HCT, MCV, PLT  CrCl cannot be calculated (Patient's most recent lab result is older than the maximum 5 days allowed.).      ASSESSMENT/Plan:      ICD-10-CM    1. Weakness of both lower extremities R29.898    2. Essential hypertension I10    3. Hereditary and idiopathic peripheral neuropathy G60.9    4. CRI (chronic renal insufficiency), stage 3 (moderate) (H) N18.3    5. Asthma, unspecified asthma severity, unspecified whether complicated, unspecified whether persistent J45.909    6. Diaphragmatic hernia without obstruction and without gangrene K44.9    7. Hypothyroidism, unspecified type E03.9    8. Allergic rhinitis, unspecified  seasonality, unspecified trigger J30.9    9. Hyperlipidemia, unspecified hyperlipidemia type E78.5      The patient is, or has been, under my care and I have initiated the establishment of the plan of care. This patient will be followed by a physician who will periodically review the plan of care.  Initial follow-up should be within 7-10 days.    Approximate time spent with this patient was greater than 30 minutes with greater than 50% spent in discharge planning.      The above has been created using voice recognition software. Please be aware that this may unintentionally  produce inaccuracies and/or nonsensical sentences.      Electronically signed by: Srinath Ballard CNP

## 2021-06-06 NOTE — PROGRESS NOTES
[facility]    Name:   Brooke Xie  : 1933  Facility:   [facility] [[id number]]   Room:   Code Status: DNR and POLST AVAILABLE -   Fac type:   SNF (Skilled Nursing Facility, TCU) -     PCP:  Martin Walton MD    CHIEF COMPLAINT / REASON FOR VISIT:  Chief Complaint   Patient presents with     Follow-up     TCU follow-up after hospitalization for lower extremity weakness and UTI.      [facility] from 2020 until 2020 (lower extremity weakness)    Patient was last seen by me on 2020 and subsequently seen by Dr. Perez on 2020.      HPI: Brooke is a 87 y.o. female with a history of hypertension, hereditary idiopathic peripheral neuropathy, hypothyroidism, asthma, hiatal hernia, and hyperlipidemia, he was admitted to [facility] on 2020 after presenting to the ED for evaluation of leg weakness.  On the day of admission, she had been standing in the kitchen when her legs became suddenly weak, and she fell.  Her  was present but unable to catch her.    In the ED, she was hypertensive to 182/101.  Labs remarkable for potassium 3.3 and UA consistent with infection, with culture growing E. coli.  She had been treated empirically with ceftriaxone and later switched to cefdinir on discharge to complete a 7-day course.  She was seen by PT/OT with recommendations for TCU and transferred here.      TCU ISSUES    Today: She seems to be doing okay, and there have been no changes since my last visit.  No problems ambulating with the walker, and she will be having one at home when she discharges.  No pain to speak of except for some neuropathy in both legs from the knees to the toes (worse on the right), a chronic condition that she has lived with for years.  Sometimes the legs bother her at night, and she kicks a lot.    Urinary tract infection: Asymptomatic.  Antibiotics completed.    Discharge planning: She had a care  conference last Thursday.  Planned discharge date is 02/13/2020.  She will require no in-home services.  2 wheeled walker has already been ordered and delivered.  She will utilize this and a reacher.    ROS: No headaches or chest pains, coughing or congestion, nausea or vomiting, dizziness or dyspnea, dysuria, diplopia, constipation or diarrhea, difficulty chewing or swallowing, or any integumentary issues.  Appetite is good, and she is sleeping well.    Past Medical History:   Diagnosis Date     Asthma      CRI (chronic renal insufficiency), stage 3 (moderate) (H) 2/12/2020     HLD (hyperlipidemia)      HTN (hypertension)      Hypokalemia      Hypothyroidism      UTI (urinary tract infection)      Weakness of both lower extremities               Family History   Problem Relation Age of Onset     Melanoma Mother      Cancer Father         colorectal      Coronary artery disease Father      Breast cancer Sister      Social History     Socioeconomic History     Marital status:      Spouse name: Not on file     Number of children: Not on file     Years of education: Not on file     Highest education level: Not on file   Occupational History     Not on file   Social Needs     Financial resource strain: Not on file     Food insecurity:     Worry: Not on file     Inability: Not on file     Transportation needs:     Medical: Not on file     Non-medical: Not on file   Tobacco Use     Smoking status: Never Smoker     Smokeless tobacco: Never Used   Substance and Sexual Activity     Alcohol use: Not Currently     Drug use: Not on file     Sexual activity: Not on file   Lifestyle     Physical activity:     Days per week: Not on file     Minutes per session: Not on file     Stress: Not on file   Relationships     Social connections:     Talks on phone: Not on file     Gets together: Not on file     Attends Anabaptism service: Not on file     Active member of club or organization: Not on file     Attends meetings of clubs  or organizations: Not on file     Relationship status: Not on file     Intimate partner violence:     Fear of current or ex partner: Not on file     Emotionally abused: Not on file     Physically abused: Not on file     Forced sexual activity: Not on file   Other Topics Concern     Not on file   Social History Narrative     Not on file       MEDICATIONS: Reviewed from the MAR, physician orders, and/or earlier progress notes.  Post Discharge Medication Reconciliation Status: medication reconciliation previously completed during another office visit.  Current Outpatient Medications   Medication Sig     albuterol (PROAIR HFA;PROVENTIL HFA;VENTOLIN HFA) 90 mcg/actuation inhaler Inhale 2 puffs 4 (four) times a day as needed.     aspirin 325 MG tablet Take 325 mg by mouth daily.     CALCIUM CITRATE ORAL Take 1,200 mg by mouth daily with supper.     cholecalciferol, vitamin D3, 1,000 unit (25 mcg) tablet Take 1,000 Units by mouth daily.     coenzyme Q10 200 mg capsule Take 200 mg by mouth daily.     fluticasone propion-salmeterol (ADVAIR) 100-50 mcg/dose DISKUS Inhale 1 puff 2 (two) times a day.     rukoaizw-gtcw-yvj8-C-dany-bosw (OSTEO BI-FLEX TRIPLE STRENGTH) 750 mg-644 mg- 30 mg-1 mg Tab Take 1 tablet by mouth daily.     hydroCHLOROthiazide (HYDRODIURIL) 25 MG tablet Take 25 mg by mouth daily.     levothyroxine (SYNTHROID, LEVOTHROID) 50 MCG tablet Take 50 mcg by mouth daily.     multivit-min-FA-lycopen-lutein (CENTRUM SILVER) 0.4-300-250 mg-mcg-mcg tablet Take 1 tablet by mouth daily.     potassium chloride (KLOR-CON) 10 MEQ CR tablet Take 10 mEq by mouth daily.     simvastatin (ZOCOR) 20 MG tablet Take 20 mg by mouth daily with supper.     ALLERGIES:   Allergies   Allergen Reactions     Alendronate      Atorvastatin      Cetirizine-Pseudoephedrine      Niacin      Omega-3 Acid Ethyl Esters      DIET: Regular, regular texture, thin liquids.    Vitals:    02/10/20 1738   BP: 102/60   Pulse: 80   Resp: 18   Temp: 98.9  F  "(37.2  C)   SpO2: 97%   Weight: 125 lb 12.8 oz (57.1 kg)   Height: 4' 11\" (1.499 m)     Body mass index is 25.41 kg/m .    EXAMINATION:   General: Pleasant, alert, and conversant elderly female, lying in bed, in no apparent distress.  Head: Normocephalic and atraumatic.   Eyes: PERRLA, sclerae clear.   ENT: Moist oral mucosa.  She has her own teeth.  No rhinorrhea or nasal discharge.  Hearing seems unimpaired.  Cardiovascular: Regular rate and rhythm with a 2/6 DAISY at the LSB.  Respiratory: Lungs clear to auscultation bilaterally.   Abdomen: Soft and nontender.   Musculoskeletal/Extremities: Age-related degenerative joint disease.  Trace lower extremity edema.  Neurologic: Weakness of bilateral lower extremities.  Integument: No rashes, clinically significant lesions, or skin breakdown.   Cognitive/Psychiatric: Alert and oriented x4.  Affect is euthymic.    DIAGNOSTICS:   Results for orders placed or performed in visit on 01/31/20   Basic Metabolic Panel   Result Value Ref Range    Sodium 140 136 - 145 mmol/L    Potassium 3.9 3.5 - 5.0 mmol/L    Chloride 103 98 - 107 mmol/L    CO2 26 22 - 31 mmol/L    Anion Gap, Calculation 11 5 - 18 mmol/L    Glucose 69 (L) 70 - 125 mg/dL    Calcium 9.7 8.5 - 10.5 mg/dL    BUN 26 8 - 28 mg/dL    Creatinine 1.09 0.60 - 1.10 mg/dL    GFR MDRD Af Amer 58 (L) >60 mL/min/1.73m2    GFR MDRD Non Af Amer 47 (L) >60 mL/min/1.73m2     No results found for: WBC, HGB, HCT, MCV, PLT  CrCl cannot be calculated (Patient's most recent lab result is older than the maximum 5 days allowed.).      ASSESSMENT/Plan:      ICD-10-CM    1. Weakness of both lower extremities R29.898    2. Essential hypertension I10    3. Hereditary and idiopathic peripheral neuropathy G60.9    4. CRI (chronic renal insufficiency), stage 3 (moderate) (H) N18.3    5. Asthma, unspecified asthma severity, unspecified whether complicated, unspecified whether persistent J45.909    6. Diaphragmatic hernia without obstruction and " without gangrene K44.9    7. Hypothyroidism, unspecified type E03.9    8. Allergic rhinitis, unspecified seasonality, unspecified trigger J30.9    9. Hyperlipidemia, unspecified hyperlipidemia type E78.5      CHANGES:    None.    CARE PLAN:    The care plan has been reviewed and all orders signed. Changes to care plan, if any, as noted. Otherwise, continue current plan of care.    The above has been created using voice recognition software. Please be aware that this may unintentionally  produce inaccuracies and/or nonsensical sentences.      Electronically signed by: Srinath Ballard, CNP

## 2021-06-06 NOTE — PROGRESS NOTES
[facility]      Facility:    [facility] [[id number]]    Code Status: DNR   [facility] 1/23 through 1/28/2020      Chief Complaint/Reason for Visit:  Chief Complaint   Patient presents with     Review Of Multiple Medical Conditions     weakness after E coli infection       HPI:   Brooke is a 87 y.o. female with history of asthma, hypertension, hypothyroidism, peripheral neuropathy, hyperlipidemia.  She lives in with her  who is blind.  On the day of admission to [facility], she was in her kitchen when she suddenly became weak, and her legs came gave out.  They called a neighbor to assist her back onto the couch, but she was unable to get up due to leg weakness and was brought to the emergency department.    She was hypertensive = 182/101.  Potassium was a bit low at 3.3.  UA was suspicious for infection she was started on ceftriaxone.  Culture ultimately grew E. coli.    She was recommended to go to TCU and went to [facility].  She was discharged on cefdinir for an additional 3 days.    UPDATE:  Therapy reports she is independent in her room. Walked 300 feet with a walker  She will need a walker for home use    Past Medical History:  Past Medical History:   Diagnosis Date     Asthma      CRI (chronic renal insufficiency), stage 3 (moderate) (H) 2/12/2020     HLD (hyperlipidemia)      HTN (hypertension)      Hypokalemia      Hypothyroidism      UTI (urinary tract infection)      Weakness of both lower extremities          Review of Systems     Feeling much better      Blood pressure 146/78, pulse 71, temperature (!) 96.1  F (35.6  C), resp. rate 18, SpO2 96 %.        Physical Exam    Constitutional:      Thin, elderly , energetic  female   Cardiovascular:       Normal rate and regular rhythm.  No murmur.   Pulmonary:      Clear breath sounds.   Abdominal:  Soft, non tender   Musculoskeletal:  age-appropriate range of motion   Skin:      No rash  Neurological:      No focal deficit present. No weakness.   Psychiatric:          Mood and thinking normal.         .           Allergies   Allergen Reactions     Alendronate      Atorvastatin      Cetirizine-Pseudoephedrine      Niacin      Omega-3 Acid Ethyl Esters        Medication List:  Current Outpatient Medications   Medication Sig     albuterol (PROAIR HFA;PROVENTIL HFA;VENTOLIN HFA) 90 mcg/actuation inhaler Inhale 2 puffs 4 (four) times a day as needed.     aspirin 325 MG tablet Take 325 mg by mouth daily.     CALCIUM CITRATE ORAL Take 1,200 mg by mouth daily with supper.     cholecalciferol, vitamin D3, 1,000 unit (25 mcg) tablet Take 1,000 Units by mouth daily.     coenzyme Q10 200 mg capsule Take 200 mg by mouth daily.     fluticasone propion-salmeterol (ADVAIR) 100-50 mcg/dose DISKUS Inhale 1 puff 2 (two) times a day.     kopspbrc-xlah-hvh1-C-dany-bosw (OSTEO BI-FLEX TRIPLE STRENGTH) 750 mg-644 mg- 30 mg-1 mg Tab Take 1 tablet by mouth daily.     hydroCHLOROthiazide (HYDRODIURIL) 25 MG tablet Take 25 mg by mouth daily.     levothyroxine (SYNTHROID, LEVOTHROID) 50 MCG tablet Take 50 mcg by mouth daily.     multivit-min-FA-lycopen-lutein (CENTRUM SILVER) 0.4-300-250 mg-mcg-mcg tablet Take 1 tablet by mouth daily.     potassium chloride (KLOR-CON) 10 MEQ CR tablet Take 10 mEq by mouth daily.     simvastatin (ZOCOR) 20 MG tablet Take 20 mg by mouth daily with supper.       Labs:   Ref Range & Units 1/31/20 1000     Sodium 136 - 145 mmol/L 140     Potassium 3.5 - 5.0 mmol/L 3.9     Chloride 98 - 107 mmol/L 103     CO2 22 - 31 mmol/L 26     Anion Gap, Calculation 5 - 18 mmol/L 11     Glucose 70 - 125 mg/dL 69Low      Calcium 8.5 - 10.5 mg/dL 9.7     BUN 8 - 28 mg/dL 26     Creatinine 0.60 - 1.10 mg/dL 1.09     GFR MDRD Non Af Amer >60 mL/min/1.73m2 47Low        Ref Range & Units 1/23/20 1813   ALBUMIN 3.2 - 4.6 g/dL 4.1    PROTEIN,TOTAL 6.0 - 8.0 g/dL 7.3    GLOBULIN                  2.0 - 3.7 g/dL  3.2    A/G RATIO 1.0 - 2.0  1.3    BILIRUBIN,TOTAL 0.2 - 1.2 mg/dL 1.1    BILIRUBIN,DIRECT 0.1 - 0.5 mg/dL 0.4    BILIRUBIN,INDIRECT        0.2 - 0.8 mg/dL 0.7    ALK PHOSPHATASE 50 - 136 IU/L 78    ALT (SGPT) 8 - 45 IU/L 18    AST (SGOT) 2 - 40 IU/L 28       Ref Range & Units 1/23/20 1813   WHITE BLOOD COUNT         4.5 - 11.0 thou/cu mm 6.0    HEMOGLOBIN                12.0 - 16.0 g/dL 13.9    RDW                       11.5 - 15.5 % 13.6    PLATELET COUNT            140 - 440 thou/cu mm 208          Assessment / Plan:    ICD-10-CM    1. Weakness of extremity R29.898 Close to baseline. Discharge soon   2. CRI (chronic renal insufficiency), stage 3 (moderate) (H) N18.3    3. Essential hypertension I10 Acceptable range for 87 yr old   4. Hereditary and idiopathic peripheral neuropathy G60.9 Walker at discharge would offer more stability that her tripod cane.             Electronically signed by: Nancy Perez MD

## 2021-06-15 ENCOUNTER — TELEPHONE (OUTPATIENT)
Dept: FAMILY MEDICINE | Facility: CLINIC | Age: 86
End: 2021-06-15

## 2021-06-15 NOTE — TELEPHONE ENCOUNTER
[facility] phone call message- general phone call:    Reason for call: Patient states she usually has a full blood work lab in June. Author advised there were no orders. Patient would like a call from RN to let her know if she has to do any blood work. Says she doesn't want to come in if she doesn't have to.     Action Desired: Call back    Return call needed: Yes    OK to leave a message on voice mail? Yes    Advised patient response may take up to 2 business days: Yes    Primary language: English      needed? No    Call taken on Humera 15, 2021 at 9:12 AM by April Thaddeus    Route to [facility] TRIAGE

## 2021-06-20 NOTE — LETTER
Letter by Srinath Ballard CNP at      Author: Srinath Ballard CNP Service: -- Author Type: --    Filed:  Encounter Date: 2/10/2020 Status: (Other)         Patient: Brooke Xie   MR Number: [id number]   YOB: 1933   Date of Visit: 2/10/2020     [facility]    Name:   Brooke Xie  : 1933  Facility:   [facility] [[id number]]   Room:   Code Status: DNR and POLST AVAILABLE -   Fac type:   SNF (Skilled Nursing Facility, TCU) -     PCP:  Martin Walton MD    CHIEF COMPLAINT / REASON FOR VISIT:  Chief Complaint   Patient presents with   ? Follow-up     TCU follow-up after hospitalization for lower extremity weakness and UTI.      [facility] from 2020 until 2020 (lower extremity weakness)    Patient was last seen by me on 2020 and subsequently seen by Dr. Perez on 2020.      HPI: Brooke is a 87 y.o. female with a history of hypertension, hereditary idiopathic peripheral neuropathy, hypothyroidism, asthma, hiatal hernia, and hyperlipidemia, he was admitted to [facility] on 2020 after presenting to the ED for evaluation of leg weakness.  On the day of admission, she had been standing in the kitchen when her legs became suddenly weak, and she fell.  Her  was present but unable to catch her.    In the ED, she was hypertensive to 182/101.  Labs remarkable for potassium 3.3 and UA consistent with infection, with culture growing E. coli.  She had been treated empirically with ceftriaxone and later switched to cefdinir on discharge to complete a 7-day course.  She was seen by PT/OT with recommendations for TCU and transferred here.      TCU ISSUES    Today: She seems to be doing okay, and there have been no changes since my last visit.  No problems ambulating with the walker, and she will be having one at home when she discharges.  No pain to speak of except for some neuropathy in  both legs from the knees to the toes (worse on the right), a chronic condition that she has lived with for years.  Sometimes the legs bother her at night, and she kicks a lot.    Urinary tract infection: Asymptomatic.  Antibiotics completed.    Discharge planning: She had a care conference last Thursday.  Planned discharge date is 02/13/2020.  She will require no in-home services.  2 wheeled walker has already been ordered and delivered.  She will utilize this and a reacher.    ROS: No headaches or chest pains, coughing or congestion, nausea or vomiting, dizziness or dyspnea, dysuria, diplopia, constipation or diarrhea, difficulty chewing or swallowing, or any integumentary issues.  Appetite is good, and she is sleeping well.    Past Medical History:   Diagnosis Date   ? Asthma    ? CRI (chronic renal insufficiency), stage 3 (moderate) (H) 2/12/2020   ? HLD (hyperlipidemia)    ? HTN (hypertension)    ? Hypokalemia    ? Hypothyroidism    ? UTI (urinary tract infection)    ? Weakness of both lower extremities               Family History   Problem Relation Age of Onset   ? Melanoma Mother    ? Cancer Father         colorectal    ? Coronary artery disease Father    ? Breast cancer Sister      Social History     Socioeconomic History   ? Marital status:      Spouse name: Not on file   ? Number of children: Not on file   ? Years of education: Not on file   ? Highest education level: Not on file   Occupational History   ? Not on file   Social Needs   ? Financial resource strain: Not on file   ? Food insecurity:     Worry: Not on file     Inability: Not on file   ? Transportation needs:     Medical: Not on file     Non-medical: Not on file   Tobacco Use   ? Smoking status: Never Smoker   ? Smokeless tobacco: Never Used   Substance and Sexual Activity   ? Alcohol use: Not Currently   ? Drug use: Not on file   ? Sexual activity: Not on file   Lifestyle   ? Physical activity:     Days per week: Not on file     Minutes  per session: Not on file   ? Stress: Not on file   Relationships   ? Social connections:     Talks on phone: Not on file     Gets together: Not on file     Attends Sabianist service: Not on file     Active member of club or organization: Not on file     Attends meetings of clubs or organizations: Not on file     Relationship status: Not on file   ? Intimate partner violence:     Fear of current or ex partner: Not on file     Emotionally abused: Not on file     Physically abused: Not on file     Forced sexual activity: Not on file   Other Topics Concern   ? Not on file   Social History Narrative   ? Not on file       MEDICATIONS: Reviewed from the MAR, physician orders, and/or earlier progress notes.  Post Discharge Medication Reconciliation Status: medication reconciliation previously completed during another office visit.  Current Outpatient Medications   Medication Sig   ? albuterol (PROAIR HFA;PROVENTIL HFA;VENTOLIN HFA) 90 mcg/actuation inhaler Inhale 2 puffs 4 (four) times a day as needed.   ? aspirin 325 MG tablet Take 325 mg by mouth daily.   ? CALCIUM CITRATE ORAL Take 1,200 mg by mouth daily with supper.   ? cholecalciferol, vitamin D3, 1,000 unit (25 mcg) tablet Take 1,000 Units by mouth daily.   ? coenzyme Q10 200 mg capsule Take 200 mg by mouth daily.   ? fluticasone propion-salmeterol (ADVAIR) 100-50 mcg/dose DISKUS Inhale 1 puff 2 (two) times a day.   ? zcgyhqfw-qvjb-srt0-C-dany-bosw (OSTEO BI-FLEX TRIPLE STRENGTH) 750 mg-644 mg- 30 mg-1 mg Tab Take 1 tablet by mouth daily.   ? hydroCHLOROthiazide (HYDRODIURIL) 25 MG tablet Take 25 mg by mouth daily.   ? levothyroxine (SYNTHROID, LEVOTHROID) 50 MCG tablet Take 50 mcg by mouth daily.   ? multivit-min-FA-lycopen-lutein (CENTRUM SILVER) 0.4-300-250 mg-mcg-mcg tablet Take 1 tablet by mouth daily.   ? potassium chloride (KLOR-CON) 10 MEQ CR tablet Take 10 mEq by mouth daily.   ? simvastatin (ZOCOR) 20 MG tablet Take 20 mg by mouth daily with supper.  "    ALLERGIES:   Allergies   Allergen Reactions   ? Alendronate    ? Atorvastatin    ? Cetirizine-Pseudoephedrine    ? Niacin    ? Omega-3 Acid Ethyl Esters      DIET: Regular, regular texture, thin liquids.    Vitals:    02/10/20 1738   BP: 102/60   Pulse: 80   Resp: 18   Temp: 98.9  F (37.2  C)   SpO2: 97%   Weight: 125 lb 12.8 oz (57.1 kg)   Height: 4' 11\" (1.499 m)     Body mass index is 25.41 kg/m .    EXAMINATION:   General: Pleasant, alert, and conversant elderly female, lying in bed, in no apparent distress.  Head: Normocephalic and atraumatic.   Eyes: PERRLA, sclerae clear.   ENT: Moist oral mucosa.  She has her own teeth.  No rhinorrhea or nasal discharge.  Hearing seems unimpaired.  Cardiovascular: Regular rate and rhythm with a 2/6 DAISY at the LSB.  Respiratory: Lungs clear to auscultation bilaterally.   Abdomen: Soft and nontender.   Musculoskeletal/Extremities: Age-related degenerative joint disease.  Trace lower extremity edema.  Neurologic: Weakness of bilateral lower extremities.  Integument: No rashes, clinically significant lesions, or skin breakdown.   Cognitive/Psychiatric: Alert and oriented x4.  Affect is euthymic.    DIAGNOSTICS:   Results for orders placed or performed in visit on 01/31/20   Basic Metabolic Panel   Result Value Ref Range    Sodium 140 136 - 145 mmol/L    Potassium 3.9 3.5 - 5.0 mmol/L    Chloride 103 98 - 107 mmol/L    CO2 26 22 - 31 mmol/L    Anion Gap, Calculation 11 5 - 18 mmol/L    Glucose 69 (L) 70 - 125 mg/dL    Calcium 9.7 8.5 - 10.5 mg/dL    BUN 26 8 - 28 mg/dL    Creatinine 1.09 0.60 - 1.10 mg/dL    GFR MDRD Af Amer 58 (L) >60 mL/min/1.73m2    GFR MDRD Non Af Amer 47 (L) >60 mL/min/1.73m2     No results found for: WBC, HGB, HCT, MCV, PLT  CrCl cannot be calculated (Patient's most recent lab result is older than the maximum 5 days allowed.).      ASSESSMENT/Plan:      ICD-10-CM    1. Weakness of both lower extremities R29.898    2. Essential hypertension I10    3. " Hereditary and idiopathic peripheral neuropathy G60.9    4. CRI (chronic renal insufficiency), stage 3 (moderate) (H) N18.3    5. Asthma, unspecified asthma severity, unspecified whether complicated, unspecified whether persistent J45.909    6. Diaphragmatic hernia without obstruction and without gangrene K44.9    7. Hypothyroidism, unspecified type E03.9    8. Allergic rhinitis, unspecified seasonality, unspecified trigger J30.9    9. Hyperlipidemia, unspecified hyperlipidemia type E78.5      CHANGES:    None.    CARE PLAN:    The care plan has been reviewed and all orders signed. Changes to care plan, if any, as noted. Otherwise, continue current plan of care.    The above has been created using voice recognition software. Please be aware that this may unintentionally  produce inaccuracies and/or nonsensical sentences.      Electronically signed by: Srinath Ballard CNP

## 2021-06-20 NOTE — LETTER
Letter by Nancy Perez MD at      Author: Nancy Perez MD Service: -- Author Type: --    Filed:  Encounter Date: 2/7/2020 Status: (Other)         Patient: Brooke Xie   MR Number: [id number]   YOB: 1933   Date of Visit: 2/7/2020     [facility]      Facility:    [facility] [[id number]]    Code Status: DNR   [facility] 1/23 through 1/28/2020      Chief Complaint/Reason for Visit:  Chief Complaint   Patient presents with   ? Review Of Multiple Medical Conditions     weakness after E coli infection       HPI:   Brooke is a 87 y.o. female with history of asthma, hypertension, hypothyroidism, peripheral neuropathy, hyperlipidemia.  She lives in with her  who is blind.  On the day of admission to [place], she was in her kitchen when she suddenly became weak, and her legs came gave out.  They called a neighbor to assist her back onto the couch, but she was unable to get up due to leg weakness and was brought to the emergency department.    She was hypertensive = 182/101.  Potassium was a bit low at 3.3.  UA was suspicious for infection she was started on ceftriaxone.  Culture ultimately grew E. coli.    She was recommended to go to TCU and went to [facility].  She was discharged on cefdinir for an additional 3 days.    UPDATE:  Therapy reports she is independent in her room. Walked 300 feet with a walker  She will need a walker for home use    Past Medical History:  Past Medical History:   Diagnosis Date   ? Asthma    ? CRI (chronic renal insufficiency), stage 3 (moderate) (H) 2/12/2020   ? HLD (hyperlipidemia)    ? HTN (hypertension)    ? Hypokalemia    ? Hypothyroidism    ? UTI (urinary tract infection)    ? Weakness of both lower extremities          Review of Systems     Feeling much better      Blood pressure 146/78, pulse 71, temperature (!) 96.1  F (35.6  C), resp. rate 18, SpO2 96 %.        Physical  Exam    Constitutional:      Thin, elderly , energetic  female   Cardiovascular:       Normal rate and regular rhythm.  No murmur.   Pulmonary:      Clear breath sounds.   Abdominal:  Soft, non tender   Musculoskeletal:  age-appropriate range of motion   Skin:     No rash  Neurological:      No focal deficit present. No weakness.   Psychiatric:          Mood and thinking normal.         .           Allergies   Allergen Reactions   ? Alendronate    ? Atorvastatin    ? Cetirizine-Pseudoephedrine    ? Niacin    ? Omega-3 Acid Ethyl Esters        Medication List:  Current Outpatient Medications   Medication Sig   ? albuterol (PROAIR HFA;PROVENTIL HFA;VENTOLIN HFA) 90 mcg/actuation inhaler Inhale 2 puffs 4 (four) times a day as needed.   ? aspirin 325 MG tablet Take 325 mg by mouth daily.   ? CALCIUM CITRATE ORAL Take 1,200 mg by mouth daily with supper.   ? cholecalciferol, vitamin D3, 1,000 unit (25 mcg) tablet Take 1,000 Units by mouth daily.   ? coenzyme Q10 200 mg capsule Take 200 mg by mouth daily.   ? fluticasone propion-salmeterol (ADVAIR) 100-50 mcg/dose DISKUS Inhale 1 puff 2 (two) times a day.   ? upsmwsqg-spyg-tir3-C-dany-bosw (OSTEO BI-FLEX TRIPLE STRENGTH) 750 mg-644 mg- 30 mg-1 mg Tab Take 1 tablet by mouth daily.   ? hydroCHLOROthiazide (HYDRODIURIL) 25 MG tablet Take 25 mg by mouth daily.   ? levothyroxine (SYNTHROID, LEVOTHROID) 50 MCG tablet Take 50 mcg by mouth daily.   ? multivit-min-FA-lycopen-lutein (CENTRUM SILVER) 0.4-300-250 mg-mcg-mcg tablet Take 1 tablet by mouth daily.   ? potassium chloride (KLOR-CON) 10 MEQ CR tablet Take 10 mEq by mouth daily.   ? simvastatin (ZOCOR) 20 MG tablet Take 20 mg by mouth daily with supper.       Labs:   Ref Range & Units 1/31/20 1000     Sodium 136 - 145 mmol/L 140     Potassium 3.5 - 5.0 mmol/L 3.9     Chloride 98 - 107 mmol/L 103     CO2 22 - 31 mmol/L 26     Anion Gap, Calculation 5 - 18 mmol/L 11     Glucose 70 - 125 mg/dL 69Low      Calcium 8.5 -  10.5 mg/dL 9.7     BUN 8 - 28 mg/dL 26     Creatinine 0.60 - 1.10 mg/dL 1.09     GFR MDRD Non Af Amer >60 mL/min/1.73m2 47Low        Ref Range & Units 1/23/20 1813   ALBUMIN 3.2 - 4.6 g/dL 4.1    PROTEIN,TOTAL 6.0 - 8.0 g/dL 7.3    GLOBULIN                  2.0 - 3.7 g/dL 3.2    A/G RATIO 1.0 - 2.0  1.3    BILIRUBIN,TOTAL 0.2 - 1.2 mg/dL 1.1    BILIRUBIN,DIRECT 0.1 - 0.5 mg/dL 0.4    BILIRUBIN,INDIRECT        0.2 - 0.8 mg/dL 0.7    ALK PHOSPHATASE 50 - 136 IU/L 78    ALT (SGPT) 8 - 45 IU/L 18    AST (SGOT) 2 - 40 IU/L 28       Ref Range & Units 1/23/20 1813   WHITE BLOOD COUNT         4.5 - 11.0 thou/cu mm 6.0    HEMOGLOBIN                12.0 - 16.0 g/dL 13.9    RDW                       11.5 - 15.5 % 13.6    PLATELET COUNT            140 - 440 thou/cu mm 208          Assessment / Plan:    ICD-10-CM    1. Weakness of extremity R29.898 Close to baseline. Discharge soon   2. CRI (chronic renal insufficiency), stage 3 (moderate) (H) N18.3    3. Essential hypertension I10 Acceptable range for 87 yr old   4. Hereditary and idiopathic peripheral neuropathy G60.9 Walker at discharge would offer more stability that her tripod cane.             Electronically signed by: Nancy Perez MD

## 2021-06-20 NOTE — LETTER
Letter by Nancy Perez MD at      Author: Nancy Perez MD Service: -- Author Type: --    Filed:  Encounter Date: 1/31/2020 Status: (Other)         Patient: Brooke Xie   MR Number: [id number]   YOB: 1933   Date of Visit: 1/31/2020     [facility]      Facility:    [facility] [[id number]]    Code Status: DNR   [facility] 1/23 through 1/28/2020      Chief Complaint/Reason for Visit:  Chief Complaint   Patient presents with   ? H & P     WEAKNESS / uti       HPI:   Brooke is a 87 y.o. female with history of asthma, hypertension, hypothyroidism, peripheral neuropathy, hyperlipidemia.  She lives in with her  who is blind.  On the day of admission to [place], she was in her kitchen when she suddenly became weak, and her legs came gave out.  They called a neighbor to assist her back onto the couch, but she was unable to get up due to leg weakness and was brought to the emergency department.    She was hypertensive = 182/101.  Potassium was a bit low at 3.3.  UA was suspicious for infection she was started on ceftriaxone.  Culture ultimately grew E. coli.    She was recommended to go to TCU and went to [facility].  She was discharged on cefdinir for an additional 3 days.    Past Medical History:  Past Medical History:   Diagnosis Date   ? Asthma    ? HLD (hyperlipidemia)    ? HTN (hypertension)    ? Hypokalemia    ? Hypothyroidism    ? UTI (urinary tract infection)    ? Weakness of both lower extremities            Surgical History:  Past Surgical History:   Procedure Laterality Date   ? TONSILLECTOMY         Family History:   Family History   Problem Relation Age of Onset   ? Melanoma Mother    ? Cancer Father         colorectal    ? Coronary artery disease Father    ? Breast cancer Sister        Social History:    Social History     Socioeconomic History   ? Marital status:  second time     Spouse name:  Not on file   ? Number of children: 2, both  of SIDS   ? Years of education: Not on file   ? Highest education level: Not on file   Occupational History   ? Not on file   Social Needs   ? Financial resource strain: Not on file   ? Food insecurity:     Worry: Not on file     Inability: Not on file   ? Transportation needs:     Medical: Not on file     Non-medical: Not on file   Tobacco Use   ? Smoking status: Never Smoker   ? Smokeless tobacco: Never Used   Substance and Sexual Activity   ? Alcohol use: Not Currently   ? Drug use: Not on file   ? Sexual activity: Not on file   Lifestyle   ? Physical activity:     Days per week: Not on file     Minutes per session: Not on file   ? Stress: Not on file   Relationships   ? Social connections:     Talks on phone: Not on file     Gets together: Not on file     Attends Worship service: Not on file     Active member of club or organization: Not on file     Attends meetings of clubs or organizations: Not on file     Relationship status: Not on file       Review of Systems   She uses a tripod cane at home, but would like a walker for discharge at home for more stability  Has baseline urinary incontinence  Feeling much stronger  The remainder of the comprehensive review of systems is negative    Blood pressure (!) 152/94, pulse 88, temperature (!) 96.2  F (35.7  C), resp. rate 18, weight 125 lb 12.8 oz (57.1 kg), SpO2 99 %.        Physical Exam  Constitutional:       General: She is not in acute distress.     Appearance: She is normal weight.      Comments: Thin, elderly  female who acts more youthful than her stated age   HENT:      Nose: Nose normal.      Mouth/Throat:      Mouth: Mucous membranes are moist.   Eyes:      Extraocular Movements: Extraocular movements intact.      Conjunctiva/sclera: Conjunctivae normal.   Neck:      Musculoskeletal: No neck rigidity.   Cardiovascular:      Rate and Rhythm: Normal rate and regular rhythm.      Heart sounds: No  murmur.   Pulmonary:      Breath sounds: Normal breath sounds. No wheezing or rales.   Abdominal:      General: Bowel sounds are normal.      Palpations: Abdomen is soft.      Tenderness: There is no abdominal tenderness.   Musculoskeletal:      Comments: Hand and foot arthritic changes  Normal strength, age-appropriate range of motion   Skin:     General: Skin is warm and dry.      Comments: Plantar callus under the right fifth MTP  Thick callus under the left fifth plantar, third left toe tip.  Bruises on the left dorsal MTPs from her fall   Neurological:      General: No focal deficit present.      Mental Status: She is oriented to person, place, and time.      Motor: No weakness.      Gait: Gait normal.   Psychiatric:         Mood and Affect: Mood normal.         Thought Content: Thought content normal.         Judgment: Judgment normal.           Allergies   Allergen Reactions   ? Alendronate    ? Atorvastatin    ? Cetirizine-Pseudoephedrine    ? Niacin    ? Omega-3 Acid Ethyl Esters        Medication List:  Current Outpatient Medications   Medication Sig   ? albuterol (PROAIR HFA;PROVENTIL HFA;VENTOLIN HFA) 90 mcg/actuation inhaler Inhale 2 puffs 4 (four) times a day as needed.   ? aspirin 325 MG tablet Take 325 mg by mouth daily.   ? CALCIUM CITRATE ORAL Take 1,200 mg by mouth daily with supper.   ? cholecalciferol, vitamin D3, 1,000 unit (25 mcg) tablet Take 1,000 Units by mouth daily.   ? coenzyme Q10 200 mg capsule Take 200 mg by mouth daily.   ? fluticasone propion-salmeterol (ADVAIR) 100-50 mcg/dose DISKUS Inhale 1 puff 2 (two) times a day.   ? svfqhlpx-ubbe-pws1-C-dany-bosw (OSTEO BI-FLEX TRIPLE STRENGTH) 750 mg-644 mg- 30 mg-1 mg Tab Take 1 tablet by mouth daily.   ? hydroCHLOROthiazide (HYDRODIURIL) 25 MG tablet Take 25 mg by mouth daily.   ? levothyroxine (SYNTHROID, LEVOTHROID) 50 MCG tablet Take 50 mcg by mouth daily.   ? multivit-min-FA-lycopen-lutein (CENTRUM SILVER) 0.4-300-250 mg-mcg-mcg tablet  Take 1 tablet by mouth daily.   ? potassium chloride (KLOR-CON) 10 MEQ CR tablet Take 10 mEq by mouth daily.   ? simvastatin (ZOCOR) 20 MG tablet Take 20 mg by mouth daily with supper.       Labs:   Ref Range & Units 1/31/20 1000     Sodium 136 - 145 mmol/L 140     Potassium 3.5 - 5.0 mmol/L 3.9     Chloride 98 - 107 mmol/L 103     CO2 22 - 31 mmol/L 26     Anion Gap, Calculation 5 - 18 mmol/L 11     Glucose 70 - 125 mg/dL 69Low      Calcium 8.5 - 10.5 mg/dL 9.7     BUN 8 - 28 mg/dL 26     Creatinine 0.60 - 1.10 mg/dL 1.09     GFR MDRD Non Af Amer >60 mL/min/1.73m2 47Low        Ref Range & Units 1/23/20 1813   ALBUMIN 3.2 - 4.6 g/dL 4.1    PROTEIN,TOTAL 6.0 - 8.0 g/dL 7.3    GLOBULIN                  2.0 - 3.7 g/dL 3.2    A/G RATIO 1.0 - 2.0  1.3    BILIRUBIN,TOTAL 0.2 - 1.2 mg/dL 1.1    BILIRUBIN,DIRECT 0.1 - 0.5 mg/dL 0.4    BILIRUBIN,INDIRECT        0.2 - 0.8 mg/dL 0.7    ALK PHOSPHATASE 50 - 136 IU/L 78    ALT (SGPT) 8 - 45 IU/L 18    AST (SGOT) 2 - 40 IU/L 28       Ref Range & Units 1/23/20 1813   WHITE BLOOD COUNT         4.5 - 11.0 thou/cu mm 6.0    HEMOGLOBIN                12.0 - 16.0 g/dL 13.9    RDW                       11.5 - 15.5 % 13.6    PLATELET COUNT            140 - 440 thou/cu mm 208          Assessment / Plan:    ICD-10-CM    1. Cystitis N30.90  completed Cefdinir   2. Weakness of both lower extremities R29.898  improving with therapy   3. CKD (chronic kidney disease) stage 3, GFR 30-59 ml/min (H) N18.3    4. Essential hypertension I10  was running quite high in the hospital, permissive hypertension due to age.  Only on hydrochlorothiazide, may need other agents added   5. Hypothyroidism, unspecified type E03.9  on replacement   6. Asthma, unspecified asthma severity, unspecified whether complicated, unspecified whether persistent J45.909  continue Advair, as needed albuterol. No exacerbation at present   7. Hereditary and idiopathic peripheral neuropathy G60.9 Walker would be helpful     On a  multitude of supplements      Electronically signed by: Nancy Perez MD

## 2021-06-20 NOTE — LETTER
Letter by Srinath Ballard CNP at      Author: Srinath Ballard CNP Service: -- Author Type: --    Filed:  Encounter Date: 2/3/2020 Status: (Other)         Patient: Brooke Xie   MR Number: [id number]   YOB: 1933   Date of Visit: 2/3/2020     [facility]    Name:   Brooke Xie  : 1933  Facility:   [facility] [[id number]]   Room:   Code Status: DNR and POLST AVAILABLE -   Fac type:   SNF (Skilled Nursing Facility, TCU) -     PCP:  Martin Walton MD    CHIEF COMPLAINT / REASON FOR VISIT:  Chief Complaint   Patient presents with   ? Follow-up     TCU follow-up after hospitalization for lower extremity weakness and UTI.      [facility] from 2020 until 2020 (lower extremity weakness)    Patient was last seen by me on 2020 and subsequently seen by Dr. Perez on 2020.      HPI: Brooke is a 87 y.o. female with a history of hypertension, hereditary idiopathic peripheral neuropathy, hypothyroidism, asthma, hiatal hernia, and hyperlipidemia, he was admitted to [facility] on 2020 after presenting to the ED for evaluation of leg weakness.  On the day of admission, she had been standing in the kitchen when her legs became suddenly weak, and she fell.  Her  was present but unable to catch her.    In the ED, she was hypertensive to 182/101.  Labs remarkable for potassium 3.3 and UA consistent with infection, with culture growing E. coli.  She had been treated empirically with ceftriaxone and later switched to cefdinir on discharge to complete a 7-day course.  She was seen by PT/OT with recommendations for TCU and transferred here.      TCU ISSUES    Today: She seems to be doing okay.  She tells me her legs are doing fine.  No problems ambulating with the walker, and she will be having one at home when she discharges.  No pain to speak of except for some neuropathy in both legs from the  knees to the toes (worse on the right), a chronic condition that she has lived with for years.  Sometimes the legs bother her at night, and she kicks a lot.    Urinary tract infection: Asymptomatic.  Antibiotics completed.    Discharge planning: She has a care conference on Thursday.  I know that she is anxious to go home.    ROS: No headaches or chest pains, coughing or congestion, nausea or vomiting, dizziness or dyspnea, dysuria, diplopia, constipation or diarrhea, difficulty chewing or swallowing, or any integumentary issues.  Appetite is good, and she is sleeping well.    Past Medical History:   Diagnosis Date   ? Asthma    ? CRI (chronic renal insufficiency), stage 3 (moderate) (H) 2/12/2020   ? HLD (hyperlipidemia)    ? HTN (hypertension)    ? Hypokalemia    ? Hypothyroidism    ? UTI (urinary tract infection)    ? Weakness of both lower extremities               Family History   Problem Relation Age of Onset   ? Melanoma Mother    ? Cancer Father         colorectal    ? Coronary artery disease Father    ? Breast cancer Sister      Social History     Socioeconomic History   ? Marital status:      Spouse name: Not on file   ? Number of children: Not on file   ? Years of education: Not on file   ? Highest education level: Not on file   Occupational History   ? Not on file   Social Needs   ? Financial resource strain: Not on file   ? Food insecurity:     Worry: Not on file     Inability: Not on file   ? Transportation needs:     Medical: Not on file     Non-medical: Not on file   Tobacco Use   ? Smoking status: Never Smoker   ? Smokeless tobacco: Never Used   Substance and Sexual Activity   ? Alcohol use: Not Currently   ? Drug use: Not on file   ? Sexual activity: Not on file   Lifestyle   ? Physical activity:     Days per week: Not on file     Minutes per session: Not on file   ? Stress: Not on file   Relationships   ? Social connections:     Talks on phone: Not on file     Gets together: Not on file      Attends Jewish service: Not on file     Active member of club or organization: Not on file     Attends meetings of clubs or organizations: Not on file     Relationship status: Not on file   ? Intimate partner violence:     Fear of current or ex partner: Not on file     Emotionally abused: Not on file     Physically abused: Not on file     Forced sexual activity: Not on file   Other Topics Concern   ? Not on file   Social History Narrative   ? Not on file       MEDICATIONS: Reviewed from the MAR, physician orders, and/or earlier progress notes.  Post Discharge Medication Reconciliation Status: medication reconciliation previously completed during another office visit.  Current Outpatient Medications   Medication Sig   ? albuterol (PROAIR HFA;PROVENTIL HFA;VENTOLIN HFA) 90 mcg/actuation inhaler Inhale 2 puffs 4 (four) times a day as needed.   ? aspirin 325 MG tablet Take 325 mg by mouth daily.   ? CALCIUM CITRATE ORAL Take 1,200 mg by mouth daily with supper.   ? cholecalciferol, vitamin D3, 1,000 unit (25 mcg) tablet Take 1,000 Units by mouth daily.   ? coenzyme Q10 200 mg capsule Take 200 mg by mouth daily.   ? fluticasone propion-salmeterol (ADVAIR) 100-50 mcg/dose DISKUS Inhale 1 puff 2 (two) times a day.   ? esapsdbo-chbv-kka8-C-dany-bosw (OSTEO BI-FLEX TRIPLE STRENGTH) 750 mg-644 mg- 30 mg-1 mg Tab Take 1 tablet by mouth daily.   ? hydroCHLOROthiazide (HYDRODIURIL) 25 MG tablet Take 25 mg by mouth daily.   ? levothyroxine (SYNTHROID, LEVOTHROID) 50 MCG tablet Take 50 mcg by mouth daily.   ? multivit-min-FA-lycopen-lutein (CENTRUM SILVER) 0.4-300-250 mg-mcg-mcg tablet Take 1 tablet by mouth daily.   ? potassium chloride (KLOR-CON) 10 MEQ CR tablet Take 10 mEq by mouth daily.   ? simvastatin (ZOCOR) 20 MG tablet Take 20 mg by mouth daily with supper.     ALLERGIES:   Allergies   Allergen Reactions   ? Alendronate    ? Atorvastatin    ? Cetirizine-Pseudoephedrine    ? Niacin    ? Omega-3 Acid Ethyl Esters   "    DIET: Regular, regular texture, thin liquids.    Vitals:    02/03/20 1459   BP: 139/74   Pulse: 74   Resp: 18   Temp: 97.3  F (36.3  C)   SpO2: 94%   Weight: 125 lb 12.8 oz (57.1 kg)   Height: 4' 11\" (1.499 m)     Body mass index is 25.41 kg/m .    EXAMINATION:   General: Pleasant, alert, and conversant elderly female, lying in bed, in no apparent distress.  Head: Normocephalic and atraumatic.   Eyes: PERRLA, sclerae clear.   ENT: Moist oral mucosa.  She has her own teeth.  No rhinorrhea or nasal discharge.  Hearing seems unimpaired.  Cardiovascular: Regular rate and rhythm.  I could not appreciate any murmur today.   Respiratory: Lungs clear to auscultation bilaterally.   Abdomen: Soft and nontender.   Musculoskeletal/Extremities: Age-related degenerative joint disease.  Trace lower extremity edema.  Neurologic: Weakness of bilateral lower extremities.  Integument: No rashes, clinically significant lesions, or skin breakdown.   Cognitive/Psychiatric: Alert and oriented x4.  Affect is euthymic.    DIAGNOSTICS:   Results for orders placed or performed in visit on 01/31/20   Basic Metabolic Panel   Result Value Ref Range    Sodium 140 136 - 145 mmol/L    Potassium 3.9 3.5 - 5.0 mmol/L    Chloride 103 98 - 107 mmol/L    CO2 26 22 - 31 mmol/L    Anion Gap, Calculation 11 5 - 18 mmol/L    Glucose 69 (L) 70 - 125 mg/dL    Calcium 9.7 8.5 - 10.5 mg/dL    BUN 26 8 - 28 mg/dL    Creatinine 1.09 0.60 - 1.10 mg/dL    GFR MDRD Af Amer 58 (L) >60 mL/min/1.73m2    GFR MDRD Non Af Amer 47 (L) >60 mL/min/1.73m2     No results found for: WBC, HGB, HCT, MCV, PLT  CrCl cannot be calculated (Patient's most recent lab result is older than the maximum 5 days allowed.).      ASSESSMENT/Plan:      ICD-10-CM    1. Weakness of both lower extremities R29.898    2. Essential hypertension I10    3. Hereditary and idiopathic peripheral neuropathy G60.9    4. CRI (chronic renal insufficiency), stage 3 (moderate) (H) N18.3    5. Asthma, " unspecified asthma severity, unspecified whether complicated, unspecified whether persistent J45.909    6. Diaphragmatic hernia without obstruction and without gangrene K44.9    7. Hypothyroidism, unspecified type E03.9    8. Allergic rhinitis, unspecified seasonality, unspecified trigger J30.9    9. Hyperlipidemia, unspecified hyperlipidemia type E78.5      CHANGES:    None.    CARE PLAN:    The care plan has been reviewed and all orders signed. Changes to care plan, if any, as noted. Otherwise, continue current plan of care.    The above has been created using voice recognition software. Please be aware that this may unintentionally  produce inaccuracies and/or nonsensical sentences.      Electronically signed by: Srinath Ballard CNP

## 2021-06-20 NOTE — LETTER
Letter by Srinath Ballard CNP at      Author: Srinath Ballard CNP Service: -- Author Type: --    Filed:  Encounter Date: 2020 Status: (Other)         Patient: Brooke Xie   MR Number: [id number]   YOB: 1933   Date of Visit: 2020     [facility]    Name:   Brooke Xie  : 1933  Facility:   [facility] [[id number]]   Room:   Code Status: DNR and POLST AVAILABLE -   Fac type:   SNF (Skilled Nursing Facility, TCU) -     PCP:  Martin Walton MD    CHIEF COMPLAINT / REASON FOR VISIT:  Chief Complaint   Patient presents with   ? Follow-up     TCU follow-up after hospitalization for lower extremity weakness and UTI.      [facility] from 2020 until 2020 (lower extremity weakness)      HPI: Brooke is a 87 y.o. female with a history of hypertension, hereditary idiopathic peripheral neuropathy, hypothyroidism, asthma, hiatal hernia, and hyperlipidemia, he was admitted to [facility] on 2020 after presenting to the ED for evaluation of leg weakness.  On the day of admission, she had been standing in the kitchen when her legs became suddenly weak, and she fell.  Her  was present but unable to catch her.    In the ED, she was hypertensive to 182/101.  Labs remarkable for potassium 3.3 and UA consistent with infection, with culture growing E. coli.  She had been treated empirically with ceftriaxone and later switched to cefdinir on discharge to complete a 7-day course.  She was seen by PT/OT with recommendations for TCU and transferred here.      TCU ISSUES    Today: She seems to be doing okay.  No pain to speak of except for some neuropathy in both legs from the knees to the toes (worse on the right).  Sometimes the legs bother her at night, and she kicks a lot.    She has a positive outlook towards therapy.    Urinary tract infection: Asymptomatic.  Antibiotics are completing.    ROS: No  headaches or chest pains, coughing or congestion, nausea or vomiting, dizziness or dyspnea, dysuria, diplopia, constipation or diarrhea, difficulty chewing or swallowing, or any integumentary issues.  Appetite is good, and she is sleeping well.    Past Medical History:   Diagnosis Date   ? Asthma    ? HLD (hyperlipidemia)    ? HTN (hypertension)    ? Hypokalemia    ? Hypothyroidism    ? UTI (urinary tract infection)    ? Weakness of both lower extremities               Family History   Problem Relation Age of Onset   ? Melanoma Mother    ? Cancer Father         colorectal    ? Coronary artery disease Father    ? Breast cancer Sister      Social History     Socioeconomic History   ? Marital status:      Spouse name: Not on file   ? Number of children: Not on file   ? Years of education: Not on file   ? Highest education level: Not on file   Occupational History   ? Not on file   Social Needs   ? Financial resource strain: Not on file   ? Food insecurity:     Worry: Not on file     Inability: Not on file   ? Transportation needs:     Medical: Not on file     Non-medical: Not on file   Tobacco Use   ? Smoking status: Never Smoker   ? Smokeless tobacco: Never Used   Substance and Sexual Activity   ? Alcohol use: Not Currently   ? Drug use: Not on file   ? Sexual activity: Not on file   Lifestyle   ? Physical activity:     Days per week: Not on file     Minutes per session: Not on file   ? Stress: Not on file   Relationships   ? Social connections:     Talks on phone: Not on file     Gets together: Not on file     Attends Sabianist service: Not on file     Active member of club or organization: Not on file     Attends meetings of clubs or organizations: Not on file     Relationship status: Not on file   ? Intimate partner violence:     Fear of current or ex partner: Not on file     Emotionally abused: Not on file     Physically abused: Not on file     Forced sexual activity: Not on file   Other Topics Concern   ?  "Not on file   Social History Narrative   ? Not on file       MEDICATIONS: Reviewed from the MAR, physician orders, and/or earlier progress notes.  Post Discharge Medication Reconciliation Status: discharge medications reconciled, continue medications without change.  Current Outpatient Medications   Medication Sig   ? albuterol (PROAIR HFA;PROVENTIL HFA;VENTOLIN HFA) 90 mcg/actuation inhaler Inhale 2 puffs 4 (four) times a day as needed.   ? aspirin 325 MG tablet Take 325 mg by mouth daily.   ? CALCIUM CITRATE ORAL Take 1,200 mg by mouth daily with supper.   ? cholecalciferol, vitamin D3, 1,000 unit (25 mcg) tablet Take 1,000 Units by mouth daily.   ? coenzyme Q10 200 mg capsule Take 200 mg by mouth daily.   ? fluticasone propion-salmeterol (ADVAIR) 100-50 mcg/dose DISKUS Inhale 1 puff 2 (two) times a day.   ? wojlxesv-mfym-psz8-C-dany-bosw (OSTEO BI-FLEX TRIPLE STRENGTH) 750 mg-644 mg- 30 mg-1 mg Tab Take 1 tablet by mouth daily.   ? hydroCHLOROthiazide (HYDRODIURIL) 25 MG tablet Take 25 mg by mouth daily.   ? levothyroxine (SYNTHROID, LEVOTHROID) 50 MCG tablet Take 50 mcg by mouth daily.   ? multivit-min-FA-lycopen-lutein (CENTRUM SILVER) 0.4-300-250 mg-mcg-mcg tablet Take 1 tablet by mouth daily.   ? potassium chloride (KLOR-CON) 10 MEQ CR tablet Take 10 mEq by mouth daily.   ? simvastatin (ZOCOR) 20 MG tablet Take 20 mg by mouth daily with supper.     ALLERGIES:   Allergies   Allergen Reactions   ? Alendronate    ? Atorvastatin    ? Cetirizine-Pseudoephedrine    ? Niacin    ? Omega-3 Acid Ethyl Esters      DIET: Regular, regular texture, thin liquids.    Vitals:    01/29/20 1409   BP: (!) 134/99   Pulse: 83   Resp: 19   Temp: 97.1  F (36.2  C)   SpO2: 98%   Weight: 125 lb 12.8 oz (57.1 kg)   Height: 4' 11\" (1.499 m)     Body mass index is 25.41 kg/m .    EXAMINATION:   General: Pleasant, alert, and conversant elderly female, lying in bed, in no apparent distress.  Head: Normocephalic and atraumatic.   Eyes: " PERRLA, sclerae clear.   ENT: Moist oral mucosa.  She has her own teeth.  No rhinorrhea or nasal discharge.  Hearing seems unimpaired.  Cardiovascular: Regular rate and rhythm with a 3/6 DAISY at the LSB.   Respiratory: Lungs clear to auscultation bilaterally.   Abdomen: Soft and nontender.   Musculoskeletal/Extremities: Age-related degenerative joint disease.  Trace lower extremity edema.  Neurologic: Weakness of bilateral lower extremities.  Integument: No rashes, clinically significant lesions, or skin breakdown.   Cognitive/Psychiatric: Alert and oriented x4.  Affect is euthymic.    DIAGNOSTICS:   No results found for this or any previous visit.  No results found for: WBC, HGB, HCT, MCV, PLT  CrCl cannot be calculated (No successful lab value found.).      ASSESSMENT/Plan:      ICD-10-CM    1. Weakness of both lower extremities R29.898    2. Essential hypertension I10    3. Hereditary and idiopathic peripheral neuropathy G60.9    4. Asthma, unspecified asthma severity, unspecified whether complicated, unspecified whether persistent J45.909    5. Diaphragmatic hernia without obstruction and without gangrene K44.9    6. Hypothyroidism, unspecified type E03.9    7. Allergic rhinitis, unspecified seasonality, unspecified trigger J30.9    8. Hyperlipidemia, unspecified hyperlipidemia type E78.5      CHANGES:    None.    CARE PLAN:    The care plan has been reviewed and all orders signed. Changes to care plan, if any, as noted. Otherwise, continue current plan of care.    The above has been created using voice recognition software. Please be aware that this may unintentionally  produce inaccuracies and/or nonsensical sentences.      Electronically signed by: Srinath Ballard CNP

## 2021-06-20 NOTE — LETTER
"Letter by Srinath Ballard CNP at      Author: Srinath Ballard CNP Service: -- Author Type: --    Filed:  Encounter Date: 2020 Status: (Other)         Patient: Brooke Xie   MR Number: [id number]   YOB: 1933   Date of Visit: 2020     [facility]    Name:   Brooke Xie  : 1933  Facility:   [facility] [[id number]]   Room:   Code Status: DNR and POLST AVAILABLE -   Fac type:   SNF (Skilled Nursing Facility, TCU) -     PCP:  Martin Walton MD    CHIEF COMPLAINT / REASON FOR VISIT:  Chief Complaint   Patient presents with   ? Discharge Summary     TCU discharge after hospitalization for lower extremity weakness and UTI.      [facility] from 2020 until 2020 (lower extremity weakness)  [facility] from 2020 until 2020 (expected discharge date)      HPI: Brooke is a 87 y.o. female with a history of hypertension, hereditary idiopathic peripheral neuropathy, hypothyroidism, asthma, hiatal hernia, and hyperlipidemia, he was admitted to [facility] on 2020 after presenting to the ED for evaluation of leg weakness.  On the day of admission, she had been standing in the kitchen when her legs became suddenly weak, and she fell.  Her  was present but unable to catch her.    In the ED, she was hypertensive to 182/101.  Labs remarkable for potassium 3.3 and UA consistent with infection, with culture growing E. coli.  She had been treated empirically with ceftriaxone and later switched to cefdinir on discharge to complete a 7-day course.  She was seen by PT/OT with recommendations for TCU and transferred here.      TCU ISSUES    Summary: She has done well.  No pain to speak of except for some neuropathy that she describes as \"buzzing\" in both her legs at night, with some nights worse than others.  She tells me that \"the neuropathy is always the same.\"  Nonetheless, " she does sleep well.    Discharge planning: She did well with therapy and recently got a new 2 wheeled walker.  She will discharge to home on 02/13/2020.  She declined any home PT.    Urinary tract infection: Asymptomatic.  Antibiotics completed.    Chronic renal insufficiency: Stage III, based on labs from 01/31/2020.  Not previously listed in her history.    ROS: No headaches or chest pains, coughing or congestion, nausea or vomiting, dizziness or dyspnea, dysuria, diplopia, constipation or diarrhea, difficulty chewing or swallowing, or any integumentary issues.  Appetite is good, and she is sleeping well.    Past Medical History:   Diagnosis Date   ? Asthma    ? CRI (chronic renal insufficiency), stage 3 (moderate) (H) 2/12/2020   ? HLD (hyperlipidemia)    ? HTN (hypertension)    ? Hypokalemia    ? Hypothyroidism    ? UTI (urinary tract infection)    ? Weakness of both lower extremities               Family History   Problem Relation Age of Onset   ? Melanoma Mother    ? Cancer Father         colorectal    ? Coronary artery disease Father    ? Breast cancer Sister      Social History     Socioeconomic History   ? Marital status:      Spouse name: Not on file   ? Number of children: Not on file   ? Years of education: Not on file   ? Highest education level: Not on file   Occupational History   ? Not on file   Social Needs   ? Financial resource strain: Not on file   ? Food insecurity:     Worry: Not on file     Inability: Not on file   ? Transportation needs:     Medical: Not on file     Non-medical: Not on file   Tobacco Use   ? Smoking status: Never Smoker   ? Smokeless tobacco: Never Used   Substance and Sexual Activity   ? Alcohol use: Not Currently   ? Drug use: Not on file   ? Sexual activity: Not on file   Lifestyle   ? Physical activity:     Days per week: Not on file     Minutes per session: Not on file   ? Stress: Not on file   Relationships   ? Social connections:     Talks on phone: Not on file      Gets together: Not on file     Attends Holiness service: Not on file     Active member of club or organization: Not on file     Attends meetings of clubs or organizations: Not on file     Relationship status: Not on file   ? Intimate partner violence:     Fear of current or ex partner: Not on file     Emotionally abused: Not on file     Physically abused: Not on file     Forced sexual activity: Not on file   Other Topics Concern   ? Not on file   Social History Narrative   ? Not on file       MEDICATIONS: Reviewed from the MAR, physician orders, and/or earlier progress notes.  Post Discharge Medication Reconciliation Status: medication reconciliation previously completed during another office visit.  Current Outpatient Medications   Medication Sig   ? albuterol (PROAIR HFA;PROVENTIL HFA;VENTOLIN HFA) 90 mcg/actuation inhaler Inhale 2 puffs 4 (four) times a day as needed.   ? aspirin 325 MG tablet Take 325 mg by mouth daily.   ? CALCIUM CITRATE ORAL Take 1,200 mg by mouth daily with supper.   ? cholecalciferol, vitamin D3, 1,000 unit (25 mcg) tablet Take 1,000 Units by mouth daily.   ? coenzyme Q10 200 mg capsule Take 200 mg by mouth daily.   ? fluticasone propion-salmeterol (ADVAIR) 100-50 mcg/dose DISKUS Inhale 1 puff 2 (two) times a day.   ? mwvvfjnj-klwb-aud4-C-dany-bosw (OSTEO BI-FLEX TRIPLE STRENGTH) 750 mg-644 mg- 30 mg-1 mg Tab Take 1 tablet by mouth daily.   ? hydroCHLOROthiazide (HYDRODIURIL) 25 MG tablet Take 25 mg by mouth daily.   ? levothyroxine (SYNTHROID, LEVOTHROID) 50 MCG tablet Take 50 mcg by mouth daily.   ? multivit-min-FA-lycopen-lutein (CENTRUM SILVER) 0.4-300-250 mg-mcg-mcg tablet Take 1 tablet by mouth daily.   ? potassium chloride (KLOR-CON) 10 MEQ CR tablet Take 10 mEq by mouth daily.   ? simvastatin (ZOCOR) 20 MG tablet Take 20 mg by mouth daily with supper.     ALLERGIES:   Allergies   Allergen Reactions   ? Alendronate    ? Atorvastatin    ? Cetirizine-Pseudoephedrine    ? Niacin   "  ? Omega-3 Acid Ethyl Esters      DIET: Regular, regular texture, thin liquids.    Vitals:    02/12/20 1455   BP: 116/70   Pulse: 84   Resp: 19   Temp: (!) 95.1  F (35.1  C)   SpO2: 98%   Weight: 125 lb 12.8 oz (57.1 kg)   Height: 4' 11\" (1.499 m)     Body mass index is 25.41 kg/m .    EXAMINATION:   General: Pleasant, alert, and conversant elderly female, lying in bed, in no apparent distress.  Head: Normocephalic and atraumatic.   Eyes: PERRLA, sclerae clear.   ENT: Moist oral mucosa.  She has her own teeth.  No rhinorrhea or nasal discharge.  Hearing seems unimpaired.  Cardiovascular: Regular rate and rhythm with a 3/6 DAISY at the LSB.   Respiratory: Lungs clear to auscultation bilaterally.   Abdomen: Soft and nontender.   Musculoskeletal/Extremities: Age-related degenerative joint disease.  Trace lower extremity edema.  Neurologic: Weakness of bilateral lower extremities.  Integument: No rashes, clinically significant lesions, or skin breakdown.   Cognitive/Psychiatric: Alert and oriented x4.  Affect is euthymic.    DIAGNOSTICS:   Results for orders placed or performed in visit on 01/31/20   Basic Metabolic Panel   Result Value Ref Range    Sodium 140 136 - 145 mmol/L    Potassium 3.9 3.5 - 5.0 mmol/L    Chloride 103 98 - 107 mmol/L    CO2 26 22 - 31 mmol/L    Anion Gap, Calculation 11 5 - 18 mmol/L    Glucose 69 (L) 70 - 125 mg/dL    Calcium 9.7 8.5 - 10.5 mg/dL    BUN 26 8 - 28 mg/dL    Creatinine 1.09 0.60 - 1.10 mg/dL    GFR MDRD Af Amer 58 (L) >60 mL/min/1.73m2    GFR MDRD Non Af Amer 47 (L) >60 mL/min/1.73m2     No results found for: WBC, HGB, HCT, MCV, PLT  CrCl cannot be calculated (Patient's most recent lab result is older than the maximum 5 days allowed.).      ASSESSMENT/Plan:      ICD-10-CM    1. Weakness of both lower extremities R29.898    2. Essential hypertension I10    3. Hereditary and idiopathic peripheral neuropathy G60.9    4. CRI (chronic renal insufficiency), stage 3 (moderate) (H) N18.3  "   5. Asthma, unspecified asthma severity, unspecified whether complicated, unspecified whether persistent J45.909    6. Diaphragmatic hernia without obstruction and without gangrene K44.9    7. Hypothyroidism, unspecified type E03.9    8. Allergic rhinitis, unspecified seasonality, unspecified trigger J30.9    9. Hyperlipidemia, unspecified hyperlipidemia type E78.5      The patient is, or has been, under my care and I have initiated the establishment of the plan of care. This patient will be followed by a physician who will periodically review the plan of care.  Initial follow-up should be within 7-10 days.    Approximate time spent with this patient was greater than 30 minutes with greater than 50% spent in discharge planning.      The above has been created using voice recognition software. Please be aware that this may unintentionally  produce inaccuracies and/or nonsensical sentences.      Electronically signed by: Srinath Ballard CNP

## 2021-06-24 NOTE — PROGRESS NOTES
Assessment & Plan     Rib pain  89 yo female with mechanical falls onto left upper body x2 in the last 3 weeks; last fall 6/15/21. Pt notes improving left anterior and axillary muscle tenderness between ribs 4-6 on left when changing positions. Has not had SOB, tachypnea, nor chest pain. Exam with stable vitals, no tachypnea/hypoxia. Scab at crown of skull, TTP under mammary flap on left extending superiorly to mid axillary line at approx rib 5. Lung exam unremarkable. Skin exam without e/o bruising nor trauma overlying area of concern. Initial review of CXR in clinic without e/o occult fracture nor evidence of a post fracture complication. Likely musculoskeletal pain, considered anginal like pain though provocation with LUE activity in the setting of fall makes more suspicious for msk cause. Discussed home health care and  home RN evaluation to prevent falls/support in the home, patient not interested. Discussed ice, heat, tylenol for symptomatic support, patient not interested. Discussed return to care in 7-10 days if symptoms haven't resolved. Would obtain XR ribs and chest left 3 view films at that time to re-screen for fracture with consideration of anginal work-up if symptoms persistent.  - XR CHEST 2 VW      Return in about 10 days (around 7/5/2021) for repeat imaging/evaluation if symptoms not resolved.    Baylee White DO  [facility] TERRENCE Cox is a 88 year old who presents for the following health issues  HPI     Fell last Tuesday 6/15/21 and fell the week prior. Called 911 and EMS came with equipment both times to help her up. States original fall after stubbed toe on bookshelf and fell forward onto knees and arms on carpet. EMS came only as should couldn't get back up after fall. No evaluation after. On 6/15/21 states was gathering bearings after standing from the toilet in bathroom and fell backward onto ceramic linoleum. States got a lump on head and felt tender  "under left breast and armpit. EMS evaluation to help up no other evaluation. She states EMS recommended she come see PCP to screen for rib fracture after two falls.     Current symptoms of left side pain on left breast and left underarmpt only when moving positions. Hasn't been breathing fast, no chest pain, no SOB, no cough. No ibuprofen, no tylenol. Compared to last Tuesday to what is today she feels like she might as well never have come. Pain most notable when presses on it (points to upper left lateral breast) or when rolling around on the couch. Pt reiterates a lot less pain than Tuesday. Feels like is getting better as she's now able to get up off the couch herself where as a week a go couldn't on her own.    Review of Systems   No fever, chills, cough, fast breathing.      Objective    BP 90/60   Pulse 95   Temp 98.2  F (36.8  C) (Oral)   Resp 16   Ht 1.499 m (4' 11.02\")   Wt 51.3 kg (113 lb)   SpO2 96%   BMI 22.81 kg/m    Body mass index is 22.81 kg/m .  Physical Exam  Vitals signs and nursing note reviewed.   Constitutional:       General: She is not in acute distress.     Appearance: Normal appearance. She is not diaphoretic.      Comments: Seated in wheelchair, pleasant, goal directed as has a bus to catch at 0930.   HENT:      Head: Normocephalic.      Comments: No palpable bony abnormality, no TTP, see image of crown of skull below where healing scab present, so surrounding erythema nor discharge from healing scab     Right Ear: External ear normal.      Left Ear: External ear normal.   Eyes:      General: No scleral icterus.     Extraocular Movements: Extraocular movements intact.   Neck:      Musculoskeletal: Normal range of motion.   Cardiovascular:      Rate and Rhythm: Normal rate and regular rhythm.      Pulses: Normal pulses.      Heart sounds: Normal heart sounds. No murmur.   Pulmonary:      Effort: Pulmonary effort is normal. No respiratory distress.      Breath sounds: Normal breath " sounds. No wheezing, rhonchi or rales.      Comments: TTP under mammary flap on left extending superiorly to mid axillary line between ribs 4-6.  Skin exam without e/o bruising nor trauma/excoriation overlying area of concern.   Chest:      Chest wall: Tenderness present.   Neurological:      General: No focal deficit present.      Mental Status: She is alert.   Psychiatric:         Mood and Affect: Mood normal.         Behavior: Behavior normal.         Thought Content: Thought content normal.

## 2021-06-25 ENCOUNTER — ANCILLARY PROCEDURE (OUTPATIENT)
Dept: GENERAL RADIOLOGY | Facility: CLINIC | Age: 86
End: 2021-06-25
Attending: FAMILY MEDICINE
Payer: COMMERCIAL

## 2021-06-25 ENCOUNTER — OFFICE VISIT (OUTPATIENT)
Dept: FAMILY MEDICINE | Facility: CLINIC | Age: 86
End: 2021-06-25
Payer: COMMERCIAL

## 2021-06-25 VITALS
SYSTOLIC BLOOD PRESSURE: 90 MMHG | TEMPERATURE: 98.2 F | DIASTOLIC BLOOD PRESSURE: 60 MMHG | HEIGHT: 59 IN | BODY MASS INDEX: 22.78 KG/M2 | OXYGEN SATURATION: 96 % | HEART RATE: 95 BPM | WEIGHT: 113 LBS | RESPIRATION RATE: 16 BRPM

## 2021-06-25 DIAGNOSIS — R07.81 RIB PAIN: Primary | ICD-10-CM

## 2021-06-25 PROCEDURE — 99213 OFFICE O/P EST LOW 20 MIN: CPT | Mod: GC | Performed by: STUDENT IN AN ORGANIZED HEALTH CARE EDUCATION/TRAINING PROGRAM

## 2021-06-25 PROCEDURE — 71046 X-RAY EXAM CHEST 2 VIEWS: CPT | Mod: FY | Performed by: RADIOLOGY

## 2021-06-25 ASSESSMENT — MIFFLIN-ST. JEOR: SCORE: 848.44

## 2021-06-25 NOTE — PROGRESS NOTES
Preceptor Attestation:   Patient seen, evaluated and discussed with the resident. I have verified the content of the note, which accurately reflects my assessment of the patient and the plan of care.   Supervising Physician:  Lance Farfan MD

## 2021-07-12 DIAGNOSIS — E78.2 MIXED HYPERLIPIDEMIA: Primary | ICD-10-CM

## 2021-07-12 RX ORDER — SIMVASTATIN 20 MG
20 TABLET ORAL DAILY
Qty: 90 TABLET | Refills: 0 | Status: SHIPPED | OUTPATIENT
Start: 2021-07-12

## 2021-07-12 NOTE — TELEPHONE ENCOUNTER
"Patient requesting refill of simvastatin. Last office visit 6/25/21 with Dr. White. Appears medication was prescribed and last refilled by other provider. Routing to PCP to reorder if appropriate.     Request for medication refill:    Providers if patient needs an appointment and you are willing to give a one month supply please refill for one month and  send a letter/MyChart using \".SMILLIMITEDREFILL\" .smillimited and route chart to \"P SMI \" (Giving one month refill in non controlled medications is strongly recommended before denial)    If refill has been denied, meaning absolutely no refills without visit, please complete the smart phrase \".smirxrefuse\" and route it to the \"P SMI MED REFILLS\"  pool to inform the patient and the pharmacy.    Robyn Elmore RN              "

## 2021-07-13 ENCOUNTER — TELEPHONE (OUTPATIENT)
Dept: FAMILY MEDICINE | Facility: CLINIC | Age: 86
End: 2021-07-13

## 2021-07-13 NOTE — TELEPHONE ENCOUNTER
Medication sent in yesterday by  to [facility]- see telephone encounter 7/12/21.     RN contacted patient and let her know- she verbalized understanding and had no further questions at this time.   Tata Bell RN

## 2021-07-13 NOTE — TELEPHONE ENCOUNTER
[facility] phone call message- patient requesting a refill:    Full Medication Name: simvastatin (ZOCOR) 20 MG tablet    Dose: Route: Take 1 tablet (20 mg) by mouth daily - Oral    Pharmacy confirmed as   [facility] - [place], [place] - [address] [place] [id number]  Phone: [phone] Fax: [phone]  : Yes    Additional Comments:  Out of med. Patient says would like them mailed. If unable to mail, please call to advise.    OK to leave a message on voice mail? Yes    Primary language: English      needed? No    Call taken on July 13, 2021 at 8:04 AM by Robert Navarro

## 2021-08-12 ENCOUNTER — TELEPHONE (OUTPATIENT)
Dept: FAMILY MEDICINE | Facility: CLINIC | Age: 86
End: 2021-08-12

## 2021-08-12 NOTE — TELEPHONE ENCOUNTER
LOTUS called back and let Brooke know that they phone number she was given by LOTUS yesterday is an agency that helps find people local assisted living locations that have openings. Brooke thanked LOTUS and stated she would call them.    SALVADOR Connolly  Pronouns: She/Her/Hers  , Care Coordination  [facility]  [phone]

## 2021-08-12 NOTE — TELEPHONE ENCOUNTER
[facility] phone call message- general phone call:    Reason for call: Patient would like help locating Senior Living Facility on [address]. States the place she looked up was a hardware store.     Action Desired: Call Back    Return call needed: Yes    OK to leave a message on voice mail? Yes    Advised patient response may take up to 2 business days: Yes    Primary language: English      needed? No    Call taken on August 12, 2021 at 12:50 PM by April Bivens    Route to [facility] TRIAGE

## 2021-09-02 DIAGNOSIS — T50.2X5A DIURETIC-INDUCED HYPOKALEMIA: ICD-10-CM

## 2021-09-02 DIAGNOSIS — E87.6 DIURETIC-INDUCED HYPOKALEMIA: ICD-10-CM

## 2021-09-02 RX ORDER — POTASSIUM CHLORIDE 750 MG/1
10 TABLET, EXTENDED RELEASE ORAL 2 TIMES DAILY
Qty: 180 TABLET | Refills: 0 | Status: SHIPPED | OUTPATIENT
Start: 2021-09-02 | End: 2022-03-18

## 2021-09-02 NOTE — TELEPHONE ENCOUNTER
"Request for medication refill:  potassium chloride ER (K-TAB/KLOR-CON) 10 MEQ CR tablet  Providers if patient needs an appointment and you are willing to give a one month supply please refill for one month and  send a letter/MyChart using \".SMILLIMITEDREFILL\" .smillimited and route chart to \"P SMI \" (Giving one month refill in non controlled medications is strongly recommended before denial)    If refill has been denied, meaning absolutely no refills without visit, please complete the smart phrase \".smirxrefuse\" and route it to the \"P SMI MED REFILLS\"  pool to inform the patient and the pharmacy.    Shasta Dougherty      "

## 2021-10-07 ENCOUNTER — LAB REQUISITION (OUTPATIENT)
Dept: LAB | Facility: CLINIC | Age: 86
End: 2021-10-07
Payer: COMMERCIAL

## 2021-10-07 DIAGNOSIS — R79.9 ABNORMAL FINDING OF BLOOD CHEMISTRY, UNSPECIFIED: ICD-10-CM

## 2021-10-09 LAB
ANION GAP SERPL CALCULATED.3IONS-SCNC: 10 MMOL/L (ref 5–18)
BUN SERPL-MCNC: 33 MG/DL (ref 8–28)
CALCIUM SERPL-MCNC: 9.3 MG/DL (ref 8.5–10.5)
CHLORIDE BLD-SCNC: 104 MMOL/L (ref 98–107)
CO2 SERPL-SCNC: 24 MMOL/L (ref 22–31)
CREAT SERPL-MCNC: 0.81 MG/DL (ref 0.6–1.1)
GFR SERPL CREATININE-BSD FRML MDRD: 65 ML/MIN/1.73M2
GLUCOSE BLD-MCNC: 94 MG/DL (ref 70–125)
POTASSIUM BLD-SCNC: 4 MMOL/L (ref 3.5–5)
SODIUM SERPL-SCNC: 138 MMOL/L (ref 136–145)

## 2021-10-09 PROCEDURE — 80048 BASIC METABOLIC PNL TOTAL CA: CPT | Performed by: INTERNAL MEDICINE

## 2021-10-09 PROCEDURE — 36415 COLL VENOUS BLD VENIPUNCTURE: CPT | Performed by: INTERNAL MEDICINE

## 2021-10-09 PROCEDURE — P9603 ONE-WAY ALLOW PRORATED MILES: HCPCS | Performed by: INTERNAL MEDICINE

## 2021-10-13 ENCOUNTER — TRANSITIONAL CARE UNIT VISIT (OUTPATIENT)
Dept: GERIATRICS | Facility: CLINIC | Age: 86
End: 2021-10-13
Payer: COMMERCIAL

## 2021-10-13 VITALS
RESPIRATION RATE: 16 BRPM | HEIGHT: 59 IN | SYSTOLIC BLOOD PRESSURE: 130 MMHG | OXYGEN SATURATION: 92 % | BODY MASS INDEX: 24.39 KG/M2 | WEIGHT: 121 LBS | DIASTOLIC BLOOD PRESSURE: 82 MMHG | TEMPERATURE: 96.2 F | HEART RATE: 80 BPM

## 2021-10-13 DIAGNOSIS — K44.9 DIAPHRAGMATIC HERNIA WITHOUT OBSTRUCTION AND WITHOUT GANGRENE: ICD-10-CM

## 2021-10-13 DIAGNOSIS — R29.898 WEAKNESS OF BOTH LOWER EXTREMITIES: ICD-10-CM
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
## 2021-10-13 DIAGNOSIS — G60.9 HEREDITARY AND IDIOPATHIC PERIPHERAL NEUROPATHY: ICD-10-CM

## 2021-10-13 DIAGNOSIS — E03.9 ACQUIRED HYPOTHYROIDISM: ICD-10-CM

## 2021-10-13 DIAGNOSIS — I10 ESSENTIAL HYPERTENSION, BENIGN: ICD-10-CM

## 2021-10-13 DIAGNOSIS — E78.5 DYSLIPIDEMIA: ICD-10-CM

## 2021-10-13 DIAGNOSIS — J45.40 MODERATE PERSISTENT ASTHMA, UNSPECIFIED WHETHER COMPLICATED: ICD-10-CM

## 2021-10-13 DIAGNOSIS — S22.41XD MULTIPLE FRACTURES OF RIBS OF RIGHT SIDE WITH ROUTINE HEALING: ICD-10-CM

## 2021-10-13 PROCEDURE — 99305 1ST NF CARE MODERATE MDM 35: CPT | Performed by: NURSE PRACTITIONER

## 2021-10-13 RX ORDER — OXYCODONE HYDROCHLORIDE 5 MG/1
2.5 TABLET ORAL EVERY 4 HOURS PRN
COMMUNITY

## 2021-10-13 RX ORDER — IBUPROFEN 400 MG/1
400 TABLET, FILM COATED ORAL EVERY 6 HOURS PRN
COMMUNITY

## 2021-10-13 RX ORDER — LIDOCAINE 4 G/G
1 PATCH TOPICAL EVERY 24 HOURS
COMMUNITY

## 2021-10-13 RX ORDER — AMLODIPINE BESYLATE 5 MG/1
5 TABLET ORAL DAILY
COMMUNITY

## 2021-10-13 RX ORDER — PSEUDOEPHEDRINE HCL 30 MG
1 TABLET ORAL DAILY
COMMUNITY

## 2021-10-13 RX ORDER — UBIDECARENONE 200 MG
1 CAPSULE ORAL DAILY
COMMUNITY

## 2021-10-13 ASSESSMENT — MIFFLIN-ST. JEOR: SCORE: 884.79

## 2021-10-13 NOTE — LETTER
10/13/2021        RE: Brooke Xie  [address]  [address]  [facility] [address]        [facility]    Name:   Brooke Xie  :   1933  MRN:    [id number]     Facility:   [facility] (SNF) [[id number]]   Room: U 217  Code Status: DNR and POLST AVAILABLE -     DOS:  10/13/2021  Previous visit:  N/A    PCP:  Martin Walton    CHIEF COMPLAINT / REASON FOR VISIT:  Chief Complaint   Patient presents with     Hospital F/U     ADMISSION: Fall and multiple rib fractures      [facility] from 10/05/2021 until 10/06/2021 (lower extremity weakness, multiple fractures of right ribs)      HPI: Brooke is a 88 year old female with history of essential hypertension, dyslipidemia, hypothyroidism, and asthma who was admitted on 10/05/2021 following presentation to [facility] ED for evaluation of fall at home.     She awoke on the morning of admission to use the bathroom when her walker tipped ove, r causing her to fall backwards, hitting her right mid-back on some furniture on the way down. Endorsed pain at the site, denied pain elsewhere. No head injury or LOC.  He did report that she had had several falls in week prior.      In the ED, the sheath was hypertensive to 200/116, otherwise vitally stable. Labs notable for K 2.6, PCO2 52. Negative troponin and creatinine WNL. EKG sinus rhythm with premature atrial complexes; no acute changes. XR of right ribs demonstrated multiple fractures. CT chest with displaced fractures involving the right 7th, 8th, and 9th posterior ribs, undisplaced fracture of right 6th posterior rib. Cervical spine and Head CT with no acute changes. Patient given KCl and Tylenol and admitted for further management.     On admission, purewick placed due to incontinence (only utilized in the hospital).  Will surgery consulted and will did not see indication for further intervention. Given lidocaine patch for upper right back. 1 dose labetalol  "given for elevated blood pressure with improvement to 178/84.  Hydrochlorothiazide stopped and amlodipine added at the time of discharge which should help hypokalemia.     The patient symptomatically improved and was subsequently discharged to to the TCU in stable condition on 10/06/2021. Medication changes listed below. Patient will follow up with her PCP in 3-5 days.         CURRENT/RECENT TCU ISSUES    Disposition: I did receive a request for SLP to evaluate for cognition.  My time is spent with her seem to indicate that she was alert and oriented.  Lives alone in an apartment with mostly elderly people.  At this juncture, she states that she is doing well and that pain is \"good.\"  She is not asking for any as needed pain medications.    ROS:   No headaches, nausea or vomiting, dizziness or dyspnea, dysuria, constipation or diarrhea, difficulty chewing or swallowing, integumentary issues, or problems with appetite or sleep.    Past Medical History:   Diagnosis Date     Asthma      CRI (chronic renal insufficiency), stage 3 (moderate) (H) 2020     HLD (hyperlipidemia)      HTN (hypertension)      Hypokalemia      Hypothyroidism      Multiple fractures of ribs of right side (6 through 9) with routine healing 10/14/2021     UTI (urinary tract infection)      Weakness of both lower extremities               Family History   Problem Relation Age of Onset     Cancer - colorectal Father          age 94     C.A.D. Father          age 94     Melanoma Mother      Breast Cancer Sister          age 54     Cancer Father         colorectal      Coronary Artery Disease Father      Breast Cancer Sister      Social History     Socioeconomic History     Marital status:      Spouse name: None     Number of children: None     Years of education: None     Highest education level: None   Occupational History     None   Tobacco Use     Smoking status: Never Smoker     Smokeless tobacco: Never Used   Substance " and Sexual Activity     Alcohol use: No     Drug use: No     Sexual activity: None   Other Topics Concern     Parent/sibling w/ CABG, MI or angioplasty before 65F 55M? Not Asked   Social History Narrative     None     Social Determinants of Health     Financial Resource Strain:      Difficulty of Paying Living Expenses:    Food Insecurity:      Worried About Running Out of Food in the Last Year:      Ran Out of Food in the Last Year:    Transportation Needs:      Lack of Transportation (Medical):      Lack of Transportation (Non-Medical):    Physical Activity:      Days of Exercise per Week:      Minutes of Exercise per Session:    Stress:      Feeling of Stress :    Social Connections:      Frequency of Communication with Friends and Family:      Frequency of Social Gatherings with Friends and Family:      Attends Caodaism Services:      Active Member of Clubs or Organizations:      Attends Club or Organization Meetings:      Marital Status:    Intimate Partner Violence:      Fear of Current or Ex-Partner:      Emotionally Abused:      Physically Abused:      Sexually Abused:        MEDICATIONS: Reviewed from the MAR, physician orders, and/or earlier progress notes.  Post Discharge Medication Reconciliation Status: discharge medications reconciled, continue medications without change.    Current Outpatient Medications   Medication Sig     acetaminophen (TYLENOL) 500 MG tablet Take 1,000 mg by mouth every 6 hours as needed     albuterol (VENTOLIN HFA) 108 (90 Base) MCG/ACT inhaler Inhale 2 puffs into the lungs 4 times daily as needed     amLODIPine (NORVASC) 5 MG tablet Take 5 mg by mouth daily     calcium citrate 250 MG TABS Take 1 tablet by mouth daily     coenzyme Q-10 200 MG CAPS Take 1 tablet by mouth daily     fluticasone-salmeterol (ADVAIR DISKUS) 100-50 MCG/DOSE inhaler Inhale 1 puff into the lungs every 12 hours     ibuprofen (ADVIL/MOTRIN) 400 MG tablet Take 400 mg by mouth every 6 hours as needed for  "moderate pain     levothyroxine (SYNTHROID/LEVOTHROID) 50 MCG tablet Take 1 tablet (50 mcg) by mouth daily     Lidocaine (LIDOCARE) 4 % Patch Place 1 patch onto the skin every 24 hours To prevent lidocaine toxicity, patient should be patch free for 12 hrs daily.     multivitamin (CENTRUM SILVER) tablet Take 1 tablet by mouth daily     ORDER FOR DME Equipment being ordered: Cane     oxyCODONE (ROXICODONE) 5 MG tablet Take 2.5 mg by mouth every 4 hours as needed for severe pain     potassium chloride ER (K-TAB/KLOR-CON) 10 MEQ CR tablet Take 1 tablet (10 mEq) by mouth 2 times daily     simvastatin (ZOCOR) 20 MG tablet Take 1 tablet (20 mg) by mouth daily     Calcium-Vitamin D (CALTRATE 600 PLUS-VIT D OR) Take 1 tablet by mouth daily. (Patient not taking: Reported on 10/13/2021)     cholecalciferol 25 MCG (1000 UT) TABS Take 1 tablet by mouth daily (Patient not taking: Reported on 10/13/2021)     co-enzyme Q-10 30 MG CAPS capsule Take 1 capsule (30 mg) by mouth daily (Patient not taking: Reported on 10/13/2021)     hydrochlorothiazide (HYDRODIURIL) 12.5 MG tablet Take 2 tablets (25 mg) by mouth daily (Patient not taking: Reported on 10/13/2021)     No current facility-administered medications for this visit.     ALLERGIES:   Allergies   Allergen Reactions     Atorvastatin      Fosamax      Niaspan [Niacin]      Omega-3-Acid Ethyl Esters      Zyrtec-D      DIET: Regular, regular texture, thin liquids.  Mighty Shake nutritional supplement.    Vitals:    10/13/21 1446   BP: 130/82   Pulse: 80   Resp: 16   Temp: (!) 96.2  F (35.7  C)   SpO2: 92%   Weight: 54.9 kg (121 lb)   Height: 1.499 m (4' 11.02\")     Body mass index is 24.42 kg/m .    EXAMINATION:   General: Pleasant, alert, and conversant generally female, lying in bed, in no apparent distress.  Head: Normocephalic and atraumatic.   Eyes: PERRLA, sclerae clear.   ENT: Moist oral mucosa, with the lips are somewhat dry.  She has her own teeth.  No rhinorrhea or nasal " discharge.  Hearing is adequate for conversation in a quiet room.  Cardiovascular: Regular rate and rhythm without appreciable murmur.   Respiratory: Lungs clear to auscultation bilaterally.   Abdomen: Nondistended.   Musculoskeletal/Extremities: Age-related degenerative joint disease.  Bilateral hallux valgus deformities, right greater than left with great toe crossover.  No peripheral edema.  Integument: No rashes, clinically significant lesions, or skin breakdown.   Cognitive/Psychiatric: Appears alert and oriented, although cognitive evaluation is pending.  Affect is euthymic.    DIAGNOSTICS:   Recent Results (from the past 240 hour(s))   COVID-19 VIRUS (CORONAVIRUS) BY PCR (EXTERNAL RESULT)    Collection Time: 10/05/21  7:45 AM   Result Value Ref Range    COVID-19 Virus by PCR (External Result) Negative Negative   Basic metabolic panel    Collection Time: 10/09/21  5:18 AM   Result Value Ref Range    Sodium 138 136 - 145 mmol/L    Potassium 4.0 3.5 - 5.0 mmol/L    Chloride 104 98 - 107 mmol/L    Carbon Dioxide (CO2) 24 22 - 31 mmol/L    Anion Gap 10 5 - 18 mmol/L    Urea Nitrogen 33 (H) 8 - 28 mg/dL    Creatinine 0.81 0.60 - 1.10 mg/dL    Calcium 9.3 8.5 - 10.5 mg/dL    Glucose 94 70 - 125 mg/dL    GFR Estimate 65 >60 mL/min/1.73m2       ASSESSMENT/Plan:      ICD-10-CM    1. Fall at home, subsequent encounter  W19.XXXD     Y92.009    2. Multiple fractures of ribs of right side (6 through 9) with routine healing  S22.41XD    3. Weakness of both lower extremities  R29.898    4. Essential hypertension, benign goal <150  I10    5. Dyslipidemia  E78.5    6. Hereditary and idiopathic peripheral neuropathy  G60.9    7. Moderate persistent asthma, unspecified whether complicated  J45.40    8. Diaphragmatic hernia without obstruction and without gangrene  K44.9    9. Acquired hypothyroidism  E03.9        CHANGES:    None.    CARE PLAN:    The care plan, medications, vital signs, orders, and nursing notes have been  reviewed, and all orders signed. Changes to care plan, if any, as noted. Otherwise, continue current plan of care.    The above has been created using voice recognition software. Please be aware that this may unintentionally  produce inaccuracies and/or nonsensical sentences.      Electronically signed by: ALLIE Freeman CNP        Sincerely,        ALLIE Freeman CNP

## 2021-10-14 PROBLEM — G62.9 PERIPHERAL POLYNEUROPATHY: Status: ACTIVE | Noted: 2021-10-14
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
## 2021-10-14 PROBLEM — R29.898 WEAKNESS OF BOTH LOWER EXTREMITIES: Status: ACTIVE | Noted: 2021-10-14

## 2021-10-14 PROBLEM — S22.41XD MULTIPLE FRACTURES OF RIBS OF RIGHT SIDE WITH ROUTINE HEALING: Status: ACTIVE | Noted: 2021-10-14

## 2021-10-14 PROBLEM — E78.5 DYSLIPIDEMIA: Status: ACTIVE | Noted: 2021-10-14

## 2021-10-14 NOTE — PROGRESS NOTES
[facility]    Name:   Brooke Xie  :   1933  MRN:    [id number]     Facility:   [facility] ([facility]) [[id number]]   Room:   Code Status: DNR and POLST AVAILABLE -     DOS:  10/13/2021  Previous visit:  N/A    PCP:  Martin Walton    CHIEF COMPLAINT / REASON FOR VISIT:  Chief Complaint   Patient presents with     Hospital F/U     ADMISSION: Fall and multiple rib fractures      [facility] from 10/05/2021 until 10/06/2021 (lower extremity weakness, multiple fractures of right ribs)      HPI: Brooke is a 88 year old female with history of essential hypertension, dyslipidemia, hypothyroidism, and asthma who was admitted on 10/05/2021 following presentation to [facility] ED for evaluation of fall at home.     She awoke on the morning of admission to use the bathroom when her walker tipped ove, r causing her to fall backwards, hitting her right mid-back on some furniture on the way down. Endorsed pain at the site, denied pain elsewhere. No head injury or LOC.  He did report that she had had several falls in week prior.      In the ED, the sheath was hypertensive to 200/116, otherwise vitally stable. Labs notable for K 2.6, PCO2 52. Negative troponin and creatinine WNL. EKG sinus rhythm with premature atrial complexes; no acute changes. XR of right ribs demonstrated multiple fractures. CT chest with displaced fractures involving the right 7th, 8th, and 9th posterior ribs, undisplaced fracture of right 6th posterior rib. Cervical spine and Head CT with no acute changes. Patient given KCl and Tylenol and admitted for further management.     On admission, purewick placed due to incontinence (only utilized in the hospital).  Will surgery consulted and will did not see indication for further intervention. Given lidocaine patch for upper right back. 1 dose labetalol given for elevated blood pressure with improvement to 178/84.  Hydrochlorothiazide stopped and amlodipine added  "at the time of discharge which should help hypokalemia.     The patient symptomatically improved and was subsequently discharged to to the TCU in stable condition on 10/06/2021. Medication changes listed below. Patient will follow up with her PCP in 3-5 days.         CURRENT/RECENT TCU ISSUES    Disposition: I did receive a request for SLP to evaluate for cognition.  My time is spent with her seem to indicate that she was alert and oriented.  Lives alone in an apartment with mostly elderly people.  At this juncture, she states that she is doing well and that pain is \"good.\"  She is not asking for any as needed pain medications.    ROS:   No headaches, nausea or vomiting, dizziness or dyspnea, dysuria, constipation or diarrhea, difficulty chewing or swallowing, integumentary issues, or problems with appetite or sleep.    Past Medical History:   Diagnosis Date     Asthma      CRI (chronic renal insufficiency), stage 3 (moderate) (H) 2020     HLD (hyperlipidemia)      HTN (hypertension)      Hypokalemia      Hypothyroidism      Multiple fractures of ribs of right side (6 through 9) with routine healing 10/14/2021     UTI (urinary tract infection)      Weakness of both lower extremities               Family History   Problem Relation Age of Onset     Cancer - colorectal Father          age 94     C.A.D. Father          age 94     Melanoma Mother      Breast Cancer Sister          age 54     Cancer Father         colorectal      Coronary Artery Disease Father      Breast Cancer Sister      Social History     Socioeconomic History     Marital status:      Spouse name: None     Number of children: None     Years of education: None     Highest education level: None   Occupational History     None   Tobacco Use     Smoking status: Never Smoker     Smokeless tobacco: Never Used   Substance and Sexual Activity     Alcohol use: No     Drug use: No     Sexual activity: None   Other Topics Concern     " Parent/sibling w/ CABG, MI or angioplasty before 65F 55M? Not Asked   Social History Narrative     None     Social Determinants of Health     Financial Resource Strain:      Difficulty of Paying Living Expenses:    Food Insecurity:      Worried About Running Out of Food in the Last Year:      Ran Out of Food in the Last Year:    Transportation Needs:      Lack of Transportation (Medical):      Lack of Transportation (Non-Medical):    Physical Activity:      Days of Exercise per Week:      Minutes of Exercise per Session:    Stress:      Feeling of Stress :    Social Connections:      Frequency of Communication with Friends and Family:      Frequency of Social Gatherings with Friends and Family:      Attends Quaker Services:      Active Member of Clubs or Organizations:      Attends Club or Organization Meetings:      Marital Status:    Intimate Partner Violence:      Fear of Current or Ex-Partner:      Emotionally Abused:      Physically Abused:      Sexually Abused:        MEDICATIONS: Reviewed from the MAR, physician orders, and/or earlier progress notes.  Post Discharge Medication Reconciliation Status: discharge medications reconciled, continue medications without change.    Current Outpatient Medications   Medication Sig     acetaminophen (TYLENOL) 500 MG tablet Take 1,000 mg by mouth every 6 hours as needed     albuterol (VENTOLIN HFA) 108 (90 Base) MCG/ACT inhaler Inhale 2 puffs into the lungs 4 times daily as needed     amLODIPine (NORVASC) 5 MG tablet Take 5 mg by mouth daily     calcium citrate 250 MG TABS Take 1 tablet by mouth daily     coenzyme Q-10 200 MG CAPS Take 1 tablet by mouth daily     fluticasone-salmeterol (ADVAIR DISKUS) 100-50 MCG/DOSE inhaler Inhale 1 puff into the lungs every 12 hours     ibuprofen (ADVIL/MOTRIN) 400 MG tablet Take 400 mg by mouth every 6 hours as needed for moderate pain     levothyroxine (SYNTHROID/LEVOTHROID) 50 MCG tablet Take 1 tablet (50 mcg) by mouth daily      "Lidocaine (LIDOCARE) 4 % Patch Place 1 patch onto the skin every 24 hours To prevent lidocaine toxicity, patient should be patch free for 12 hrs daily.     multivitamin (CENTRUM SILVER) tablet Take 1 tablet by mouth daily     ORDER FOR DME Equipment being ordered: Cane     oxyCODONE (ROXICODONE) 5 MG tablet Take 2.5 mg by mouth every 4 hours as needed for severe pain     potassium chloride ER (K-TAB/KLOR-CON) 10 MEQ CR tablet Take 1 tablet (10 mEq) by mouth 2 times daily     simvastatin (ZOCOR) 20 MG tablet Take 1 tablet (20 mg) by mouth daily     Calcium-Vitamin D (CALTRATE 600 PLUS-VIT D OR) Take 1 tablet by mouth daily. (Patient not taking: Reported on 10/13/2021)     cholecalciferol 25 MCG (1000 UT) TABS Take 1 tablet by mouth daily (Patient not taking: Reported on 10/13/2021)     co-enzyme Q-10 30 MG CAPS capsule Take 1 capsule (30 mg) by mouth daily (Patient not taking: Reported on 10/13/2021)     hydrochlorothiazide (HYDRODIURIL) 12.5 MG tablet Take 2 tablets (25 mg) by mouth daily (Patient not taking: Reported on 10/13/2021)     No current facility-administered medications for this visit.     ALLERGIES:   Allergies   Allergen Reactions     Atorvastatin      Fosamax      Niaspan [Niacin]      Omega-3-Acid Ethyl Esters      Zyrtec-D      DIET: Regular, regular texture, thin liquids.  Mighty Shake nutritional supplement.    Vitals:    10/13/21 1446   BP: 130/82   Pulse: 80   Resp: 16   Temp: (!) 96.2  F (35.7  C)   SpO2: 92%   Weight: 54.9 kg (121 lb)   Height: 1.499 m (4' 11.02\")     Body mass index is 24.42 kg/m .    EXAMINATION:   General: Pleasant, alert, and conversant generally female, lying in bed, in no apparent distress.  Head: Normocephalic and atraumatic.   Eyes: PERRLA, sclerae clear.   ENT: Moist oral mucosa, with the lips are somewhat dry.  She has her own teeth.  No rhinorrhea or nasal discharge.  Hearing is adequate for conversation in a quiet room.  Cardiovascular: Regular rate and rhythm " without appreciable murmur.   Respiratory: Lungs clear to auscultation bilaterally.   Abdomen: Nondistended.   Musculoskeletal/Extremities: Age-related degenerative joint disease.  Bilateral hallux valgus deformities, right greater than left with great toe crossover.  No peripheral edema.  Integument: No rashes, clinically significant lesions, or skin breakdown.   Cognitive/Psychiatric: Appears alert and oriented, although cognitive evaluation is pending.  Affect is euthymic.    DIAGNOSTICS:   Recent Results (from the past 240 hour(s))   COVID-19 VIRUS (CORONAVIRUS) BY PCR (EXTERNAL RESULT)    Collection Time: 10/05/21  7:45 AM   Result Value Ref Range    COVID-19 Virus by PCR (External Result) Negative Negative   Basic metabolic panel    Collection Time: 10/09/21  5:18 AM   Result Value Ref Range    Sodium 138 136 - 145 mmol/L    Potassium 4.0 3.5 - 5.0 mmol/L    Chloride 104 98 - 107 mmol/L    Carbon Dioxide (CO2) 24 22 - 31 mmol/L    Anion Gap 10 5 - 18 mmol/L    Urea Nitrogen 33 (H) 8 - 28 mg/dL    Creatinine 0.81 0.60 - 1.10 mg/dL    Calcium 9.3 8.5 - 10.5 mg/dL    Glucose 94 70 - 125 mg/dL    GFR Estimate 65 >60 mL/min/1.73m2       ASSESSMENT/Plan:      ICD-10-CM    1. Fall at home, subsequent encounter  W19.XXXD     Y92.009    2. Multiple fractures of ribs of right side (6 through 9) with routine healing  S22.41XD    3. Weakness of both lower extremities  R29.898    4. Essential hypertension, benign goal <150  I10    5. Dyslipidemia  E78.5    6. Hereditary and idiopathic peripheral neuropathy  G60.9    7. Moderate persistent asthma, unspecified whether complicated  J45.40    8. Diaphragmatic hernia without obstruction and without gangrene  K44.9    9. Acquired hypothyroidism  E03.9        CHANGES:    None.    CARE PLAN:    The care plan, medications, vital signs, orders, and nursing notes have been reviewed, and all orders signed. Changes to care plan, if any, as noted. Otherwise, continue current plan of  care.    The above has been created using voice recognition software. Please be aware that this may unintentionally  produce inaccuracies and/or nonsensical sentences.      Electronically signed by: ALLIE Freeman CNP

## 2021-10-18 ENCOUNTER — TRANSITIONAL CARE UNIT VISIT (OUTPATIENT)
Dept: GERIATRICS | Facility: CLINIC | Age: 86
End: 2021-10-18
Payer: COMMERCIAL

## 2021-10-18 VITALS
WEIGHT: 121 LBS | TEMPERATURE: 97.5 F | SYSTOLIC BLOOD PRESSURE: 126 MMHG | HEART RATE: 86 BPM | RESPIRATION RATE: 16 BRPM | DIASTOLIC BLOOD PRESSURE: 60 MMHG | HEIGHT: 59 IN | OXYGEN SATURATION: 96 % | BODY MASS INDEX: 24.39 KG/M2
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
## 2021-10-18 DIAGNOSIS — G60.9 HEREDITARY AND IDIOPATHIC PERIPHERAL NEUROPATHY: ICD-10-CM

## 2021-10-18 DIAGNOSIS — E78.5 DYSLIPIDEMIA: ICD-10-CM

## 2021-10-18 DIAGNOSIS — R29.898 WEAKNESS OF BOTH LOWER EXTREMITIES: ICD-10-CM

## 2021-10-18 DIAGNOSIS — G31.84 MILD COGNITIVE IMPAIRMENT: ICD-10-CM

## 2021-10-18 DIAGNOSIS — S22.41XD MULTIPLE FRACTURES OF RIBS OF RIGHT SIDE WITH ROUTINE HEALING: ICD-10-CM

## 2021-10-18 DIAGNOSIS — I10 ESSENTIAL HYPERTENSION, BENIGN: ICD-10-CM
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
## 2021-10-18 DIAGNOSIS — E03.9 ACQUIRED HYPOTHYROIDISM: ICD-10-CM

## 2021-10-18 DIAGNOSIS — K44.9 DIAPHRAGMATIC HERNIA WITHOUT OBSTRUCTION AND WITHOUT GANGRENE: ICD-10-CM

## 2021-10-18 DIAGNOSIS — J45.40 MODERATE PERSISTENT ASTHMA, UNSPECIFIED WHETHER COMPLICATED: ICD-10-CM

## 2021-10-18 PROCEDURE — 99309 SBSQ NF CARE MODERATE MDM 30: CPT | Performed by: NURSE PRACTITIONER

## 2021-10-18 ASSESSMENT — MIFFLIN-ST. JEOR: SCORE: 884.48

## 2021-10-18 NOTE — LETTER
10/18/2021        RE: Brooke Xie  [address]  [address]  [facility] [address]        [facility]    Name:   Brooke Xie  :   1933  MRN:    [id number]     Facility:   [facility] (SNF) [[id number]]   Room: U 217  Code Status: DNR and POLST AVAILABLE -     DOS:  10/18/2021  Previous visit: 10/13/2021    PCP:  Martin Walton    CHIEF COMPLAINT / REASON FOR VISIT:  Chief Complaint   Patient presents with     Clinic Care Coordination - Follow-up     Fall and multiple rib fractures      [facility] from 10/05/2021 until 10/06/2021 (lower extremity weakness, multiple fractures of right ribs)      HPI: Brooke is a 88 year old female with history of essential hypertension, dyslipidemia, hypothyroidism, and asthma who was admitted on 10/05/2021 following presentation to [facility] ED for evaluation of fall at home.     She awoke on the morning of admission to use the bathroom when her walker tipped ove, r causing her to fall backwards, hitting her right mid-back on some furniture on the way down. Endorsed pain at the site, denied pain elsewhere. No head injury or LOC.  He did report that she had had several falls in week prior.      In the ED, the sheath was hypertensive to 200/116, otherwise vitally stable. Labs notable for K 2.6, PCO2 52. Negative troponin and creatinine WNL. EKG sinus rhythm with premature atrial complexes; no acute changes. XR of right ribs demonstrated multiple fractures. CT chest with displaced fractures involving the right 7th, 8th, and 9th posterior ribs, undisplaced fracture of right 6th posterior rib. Cervical spine and Head CT with no acute changes. Patient given KCl and Tylenol and admitted for further management.     On admission, purewick placed due to incontinence (only utilized in the hospital).  Will surgery consulted and will did not see indication for further intervention. Given lidocaine patch for upper right back.  "1 dose labetalol given for elevated blood pressure with improvement to 178/84.  Hydrochlorothiazide stopped and amlodipine added at the time of discharge which should help hypokalemia.     The patient symptomatically improved and was subsequently discharged to to the TCU in stable condition on 10/06/2021. Medication changes listed below. Patient will follow up with her PCP in 3-5 days.       CURRENT/RECENT TCU ISSUES    Disposition: I did receive a request for SLP to evaluate for cognition.  My initial time spent with her seemed to indicate that she was alert and oriented.  However, the perception is different today.  She asks, \"I knew where you?\"  She also did not adjust the bed before sitting on the edge to have breakfast, and she was tilting precariously while eating breakfast.      Lives alone in an apartment with mostly elderly people.  At this juncture, she states that she is doing well and that pain is \"good.\"  She is not asking for any as needed pain medications.      ROS:   No headaches, nausea or vomiting, dizziness or dyspnea, dysuria, constipation or diarrhea, difficulty chewing or swallowing, integumentary issues, or problems with appetite or sleep.    Past Medical History:   Diagnosis Date     Asthma      CRI (chronic renal insufficiency), stage 3 (moderate) (H) 2020     HLD (hyperlipidemia)      HTN (hypertension)      Hypokalemia      Hypothyroidism      Multiple fractures of ribs of right side (6 through 9) with routine healing 10/14/2021     UTI (urinary tract infection)      Weakness of both lower extremities               Family History   Problem Relation Age of Onset     Cancer - colorectal Father          age 94     C.A.D. Father          age 94     Melanoma Mother      Breast Cancer Sister          age 54     Cancer Father         colorectal      Coronary Artery Disease Father      Breast Cancer Sister      Social History     Socioeconomic History     Marital status:  "     Spouse name: None     Number of children: None     Years of education: None     Highest education level: None   Occupational History     None   Tobacco Use     Smoking status: Never Smoker     Smokeless tobacco: Never Used   Substance and Sexual Activity     Alcohol use: No     Drug use: No     Sexual activity: None   Other Topics Concern     Parent/sibling w/ CABG, MI or angioplasty before 65F 55M? Not Asked   Social History Narrative     None     Social Determinants of Health     Financial Resource Strain:      Difficulty of Paying Living Expenses:    Food Insecurity:      Worried About Running Out of Food in the Last Year:      Ran Out of Food in the Last Year:    Transportation Needs:      Lack of Transportation (Medical):      Lack of Transportation (Non-Medical):    Physical Activity:      Days of Exercise per Week:      Minutes of Exercise per Session:    Stress:      Feeling of Stress :    Social Connections:      Frequency of Communication with Friends and Family:      Frequency of Social Gatherings with Friends and Family:      Attends Alevism Services:      Active Member of Clubs or Organizations:      Attends Club or Organization Meetings:      Marital Status:    Intimate Partner Violence:      Fear of Current or Ex-Partner:      Emotionally Abused:      Physically Abused:      Sexually Abused:        MEDICATIONS: Reviewed from the MAR, physician orders, and/or earlier progress notes.  Post Discharge Medication Reconciliation Status: medication reconcilation previously completed during another office visit.    Current Outpatient Medications   Medication Sig     acetaminophen (TYLENOL) 500 MG tablet Take 1,000 mg by mouth every 6 hours as needed     albuterol (VENTOLIN HFA) 108 (90 Base) MCG/ACT inhaler Inhale 2 puffs into the lungs 4 times daily as needed     amLODIPine (NORVASC) 5 MG tablet Take 5 mg by mouth daily     calcium citrate 250 MG TABS Take 1 tablet by mouth daily     Calcium-Vitamin D  "(CALTRATE 600 PLUS-VIT D OR) Take 1 tablet by mouth daily. (Patient not taking: Reported on 10/13/2021)     cholecalciferol 25 MCG (1000 UT) TABS Take 1 tablet by mouth daily (Patient not taking: Reported on 10/13/2021)     co-enzyme Q-10 30 MG CAPS capsule Take 1 capsule (30 mg) by mouth daily (Patient not taking: Reported on 10/13/2021)     coenzyme Q-10 200 MG CAPS Take 1 tablet by mouth daily     fluticasone-salmeterol (ADVAIR DISKUS) 100-50 MCG/DOSE inhaler Inhale 1 puff into the lungs every 12 hours     hydrochlorothiazide (HYDRODIURIL) 12.5 MG tablet Take 2 tablets (25 mg) by mouth daily (Patient not taking: Reported on 10/13/2021)     ibuprofen (ADVIL/MOTRIN) 400 MG tablet Take 400 mg by mouth every 6 hours as needed for moderate pain     levothyroxine (SYNTHROID/LEVOTHROID) 50 MCG tablet Take 1 tablet (50 mcg) by mouth daily     Lidocaine (LIDOCARE) 4 % Patch Place 1 patch onto the skin every 24 hours To prevent lidocaine toxicity, patient should be patch free for 12 hrs daily.     multivitamin (CENTRUM SILVER) tablet Take 1 tablet by mouth daily     ORDER FOR DME Equipment being ordered: Cane     oxyCODONE (ROXICODONE) 5 MG tablet Take 2.5 mg by mouth every 4 hours as needed for severe pain     potassium chloride ER (K-TAB/KLOR-CON) 10 MEQ CR tablet Take 1 tablet (10 mEq) by mouth 2 times daily     simvastatin (ZOCOR) 20 MG tablet Take 1 tablet (20 mg) by mouth daily     No current facility-administered medications for this visit.     ALLERGIES:   Allergies   Allergen Reactions     Atorvastatin      Fosamax      Niaspan [Niacin]      Omega-3-Acid Ethyl Esters      Zyrtec-D      DIET: Regular, regular texture, thin liquids.  Mighty Shake nutritional supplement.    Vitals:    10/18/21 1344   BP: 126/60   Pulse: 86   Resp: 16   Temp: 97.5  F (36.4  C)   SpO2: 96%   Weight: 54.9 kg (121 lb)   Height: 1.499 m (4' 11\")     Body mass index is 24.44 kg/m .    EXAMINATION:   General: Pleasant, alert, and conversant " generally female, lying in bed, in no apparent distress.  Head: Normocephalic and atraumatic.   Eyes: PERRLA, sclerae clear.   ENT: Moist oral mucosa, with the lips are somewhat dry.  She has her own teeth.  No rhinorrhea or nasal discharge.  Hearing is adequate for conversation in a quiet room.  Cardiovascular: Regular rate and rhythm without appreciable murmur.   Respiratory: Lungs clear to auscultation bilaterally.   Abdomen: Nondistended.   Musculoskeletal/Extremities: Age-related degenerative joint disease.  Bilateral hallux valgus deformities, right greater than left with great toe crossover.  No peripheral edema.  Integument: No rashes, clinically significant lesions, or skin breakdown.  There are, however, fungal toenails.  Cognitive/Psychiatric: Appears alert and oriented, although cognitive evaluation is pending.  Affect is euthymic.    DIAGNOSTICS:   No results found for this or any previous visit (from the past 240 hour(s)).   Last Comprehensive Metabolic Panel:  Sodium   Date Value Ref Range Status   10/09/2021 138 136 - 145 mmol/L Final   12/14/2020 132.2 (L) 132.6 - 141.4 mmol/L Final     Potassium   Date Value Ref Range Status   10/09/2021 4.0 3.5 - 5.0 mmol/L Final   12/14/2020 3.2 (L) 3.3 - 4.5 mmol/L Final     Chloride   Date Value Ref Range Status   10/09/2021 104 98 - 107 mmol/L Final   12/14/2020 97.4 (L) 98.0 - 110.0 mmol/L Final     Carbon Dioxide   Date Value Ref Range Status   12/14/2020 29.8 20.0 - 32.0 mmol/L Final     Carbon Dioxide (CO2)   Date Value Ref Range Status   10/09/2021 24 22 - 31 mmol/L Final     Anion Gap   Date Value Ref Range Status   10/09/2021 10 5 - 18 mmol/L Final   12/15/2014 4 3 - 14 mmol/L Final     Glucose   Date Value Ref Range Status   10/09/2021 94 70 - 125 mg/dL Final   12/14/2020 92.4 70.0 - 99.0 mg'dL Final     Urea Nitrogen   Date Value Ref Range Status   10/09/2021 33 (H) 8 - 28 mg/dL Final   12/14/2020 16.4 7.0 - 19.0 mg/dL Final     Creatinine   Date  Value Ref Range Status   10/09/2021 0.81 0.60 - 1.10 mg/dL Final   12/14/2020 0.9 0.5 - 1.0 mg/dL Final     GFR Estimate   Date Value Ref Range Status   10/09/2021 65 >60 mL/min/1.73m2 Final     Comment:     As of July 11, 2021, eGFR is calculated by the CKD-EPI creatinine equation, without race adjustment. eGFR can be influenced by muscle mass, exercise, and diet. The reported eGFR is an estimation only and is only applicable if the renal function is stable.   12/14/2020 67.0 >60.0 mL/min/1.7 m2 Final     Calcium   Date Value Ref Range Status   10/09/2021 9.3 8.5 - 10.5 mg/dL Final   12/14/2020 9.7 8.5 - 10.1 mg/dL Final       ASSESSMENT/Plan:      ICD-10-CM    1. Fall at home, subsequent encounter  W19.XXXD     Y92.009    2. Multiple fractures of ribs of right side (6 through 9) with routine healing  S22.41XD    3. Weakness of both lower extremities  R29.898    4. Mild cognitive impairment  G31.84    5. Essential hypertension, benign goal <150  I10    6. Dyslipidemia  E78.5    7. Hereditary and idiopathic peripheral neuropathy  G60.9    8. Moderate persistent asthma, unspecified whether complicated  J45.40    9. Diaphragmatic hernia without obstruction and without gangrene  K44.9    10. Acquired hypothyroidism  E03.9        CHANGES:    None.    CARE PLAN:    The care plan, medications, vital signs, orders, and nursing notes have been reviewed, and all orders signed. Changes to care plan, if any, as noted. Otherwise, continue current plan of care.    The above has been created using voice recognition software. Please be aware that this may unintentionally  produce inaccuracies and/or nonsensical sentences.      Electronically signed by: ALLIE Freeman CNP        Sincerely,        ALLIE Freeman CNP

## 2021-10-21 ENCOUNTER — TRANSITIONAL CARE UNIT VISIT (OUTPATIENT)
Dept: GERIATRICS | Facility: CLINIC | Age: 86
End: 2021-10-21
Payer: COMMERCIAL

## 2021-10-21 DIAGNOSIS — S22.41XD MULTIPLE FRACTURES OF RIBS OF RIGHT SIDE WITH ROUTINE HEALING: ICD-10-CM
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
## 2021-10-21 DIAGNOSIS — K44.9 DIAPHRAGMATIC HERNIA WITHOUT OBSTRUCTION AND WITHOUT GANGRENE: ICD-10-CM
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
## 2021-10-21 DIAGNOSIS — G60.9 HEREDITARY AND IDIOPATHIC PERIPHERAL NEUROPATHY: ICD-10-CM

## 2021-10-21 DIAGNOSIS — J45.40 MODERATE PERSISTENT ASTHMA, UNSPECIFIED WHETHER COMPLICATED: ICD-10-CM

## 2021-10-21 DIAGNOSIS — E03.9 ACQUIRED HYPOTHYROIDISM: ICD-10-CM

## 2021-10-21 DIAGNOSIS — I10 ESSENTIAL HYPERTENSION, BENIGN: ICD-10-CM

## 2021-10-21 DIAGNOSIS — R29.898 WEAKNESS OF BOTH LOWER EXTREMITIES: ICD-10-CM

## 2021-10-21 DIAGNOSIS — E78.5 DYSLIPIDEMIA: ICD-10-CM

## 2021-10-21 PROCEDURE — 99309 SBSQ NF CARE MODERATE MDM 30: CPT | Performed by: NURSE PRACTITIONER

## 2021-10-21 ASSESSMENT — MIFFLIN-ST. JEOR: SCORE: 884.48

## 2021-10-21 NOTE — LETTER
10/21/2021        RE: Brooke Xie  [address]  [address]  [facility] [address]        [facility]    Name:   Brooke Xie  :   1933  MRN:    [id number]     Facility:   [facility] (SNF) [[id number]]   Room: U 217  Code Status: DNR and POLST AVAILABLE -     DOS:  10/21/2021  Previous visit: 10/18/2021    PCP:  Martin Walton    CHIEF COMPLAINT / REASON FOR VISIT:  Chief Complaint   Patient presents with     Clinic Care Coordination - Follow-up     fall and multiple rib fractures      [facility] from 10/05/2021 until 10/06/2021 (lower extremity weakness, multiple fractures of right ribs)      HPI: Brooke is a 88 year old female with history of essential hypertension, dyslipidemia, hypothyroidism, and asthma who was admitted on 10/05/2021 following presentation to [facility] ED for evaluation of fall at home.     She awoke on the morning of admission to use the bathroom when her walker tipped ove, r causing her to fall backwards, hitting her right mid-back on some furniture on the way down. Endorsed pain at the site, denied pain elsewhere. No head injury or LOC.  He did report that she had had several falls in week prior.      In the ED, the sheath was hypertensive to 200/116, otherwise vitally stable. Labs notable for K 2.6, PCO2 52. Negative troponin and creatinine WNL. EKG sinus rhythm with premature atrial complexes; no acute changes. XR of right ribs demonstrated multiple fractures. CT chest with displaced fractures involving the right 7th, 8th, and 9th posterior ribs, undisplaced fracture of right 6th posterior rib. Cervical spine and Head CT with no acute changes. Patient given KCl and Tylenol and admitted for further management.     On admission, purewick placed due to incontinence (only utilized in the hospital).  Will surgery consulted and will did not see indication for further intervention. Given lidocaine patch for upper right back.  "1 dose labetalol given for elevated blood pressure with improvement to 178/84.  Hydrochlorothiazide stopped and amlodipine added at the time of discharge which should help hypokalemia.     The patient symptomatically improved and was subsequently discharged to to the TCU in stable condition on 10/06/2021. Medication changes listed below. Patient will follow up with her PCP in 3-5 days.       CURRENT/RECENT TCU ISSUES    Disposition: I did receive a request for SLP to evaluate for cognition.  My initial time spent with her seemed to indicate that she was alert and oriented.  However, the perception was different again today.  At my last visit, she asked, \"I knew where you?\"  She also did not adjust the bed before sitting on the edge to have breakfast, and she was tilting precariously while eating breakfast.  TODAY, the SLP was in the room, and we discussed this further, confirming what are now clearer deficits.    Lives alone in an apartment with mostly elderly people.  Today, she she indicates she would like to go home to her .  Her  is 72 years old and blind.  She decided to call her  while I was still in the middle of my visit with her.    ROS:   No headaches, nausea or vomiting, dizziness or dyspnea, dysuria, constipation or diarrhea, difficulty chewing or swallowing, integumentary issues, or problems with appetite or sleep.    Past Medical History:   Diagnosis Date     Asthma      CRI (chronic renal insufficiency), stage 3 (moderate) (H) 2020     HLD (hyperlipidemia)      HTN (hypertension)      Hypokalemia      Hypothyroidism      Multiple fractures of ribs of right side (6 through 9) with routine healing 10/14/2021     UTI (urinary tract infection)      Weakness of both lower extremities               Family History   Problem Relation Age of Onset     Cancer - colorectal Father          age 94     C.A.D. Father          age 94     Melanoma Mother      Breast Cancer Sister      "     age 54     Cancer Father         colorectal      Coronary Artery Disease Father      Breast Cancer Sister      Social History     Socioeconomic History     Marital status:      Spouse name: None     Number of children: None     Years of education: None     Highest education level: None   Occupational History     None   Tobacco Use     Smoking status: Never Smoker     Smokeless tobacco: Never Used   Substance and Sexual Activity     Alcohol use: No     Drug use: No     Sexual activity: None   Other Topics Concern     Parent/sibling w/ CABG, MI or angioplasty before 65F 55M? Not Asked   Social History Narrative     None     Social Determinants of Health     Financial Resource Strain:      Difficulty of Paying Living Expenses:    Food Insecurity:      Worried About Running Out of Food in the Last Year:      Ran Out of Food in the Last Year:    Transportation Needs:      Lack of Transportation (Medical):      Lack of Transportation (Non-Medical):    Physical Activity:      Days of Exercise per Week:      Minutes of Exercise per Session:    Stress:      Feeling of Stress :    Social Connections:      Frequency of Communication with Friends and Family:      Frequency of Social Gatherings with Friends and Family:      Attends Worship Services:      Active Member of Clubs or Organizations:      Attends Club or Organization Meetings:      Marital Status:    Intimate Partner Violence:      Fear of Current or Ex-Partner:      Emotionally Abused:      Physically Abused:      Sexually Abused:        MEDICATIONS: Reviewed from the MAR, physician orders, and/or earlier progress notes.  Post Discharge Medication Reconciliation Status: medication reconcilation previously completed during another office visit.    Current Outpatient Medications   Medication Sig     acetaminophen (TYLENOL) 500 MG tablet Take 1,000 mg by mouth every 6 hours as needed     albuterol (VENTOLIN HFA) 108 (90 Base) MCG/ACT inhaler Inhale 2  puffs into the lungs 4 times daily as needed     amLODIPine (NORVASC) 5 MG tablet Take 5 mg by mouth daily     calcium citrate 250 MG TABS Take 1 tablet by mouth daily     Calcium-Vitamin D (CALTRATE 600 PLUS-VIT D OR) Take 1 tablet by mouth daily. (Patient not taking: Reported on 10/13/2021)     cholecalciferol 25 MCG (1000 UT) TABS Take 1 tablet by mouth daily (Patient not taking: Reported on 10/13/2021)     co-enzyme Q-10 30 MG CAPS capsule Take 1 capsule (30 mg) by mouth daily (Patient not taking: Reported on 10/13/2021)     coenzyme Q-10 200 MG CAPS Take 1 tablet by mouth daily     fluticasone-salmeterol (ADVAIR DISKUS) 100-50 MCG/DOSE inhaler Inhale 1 puff into the lungs every 12 hours     hydrochlorothiazide (HYDRODIURIL) 12.5 MG tablet Take 2 tablets (25 mg) by mouth daily (Patient not taking: Reported on 10/13/2021)     ibuprofen (ADVIL/MOTRIN) 400 MG tablet Take 400 mg by mouth every 6 hours as needed for moderate pain     levothyroxine (SYNTHROID/LEVOTHROID) 50 MCG tablet Take 1 tablet (50 mcg) by mouth daily     Lidocaine (LIDOCARE) 4 % Patch Place 1 patch onto the skin every 24 hours To prevent lidocaine toxicity, patient should be patch free for 12 hrs daily.     multivitamin (CENTRUM SILVER) tablet Take 1 tablet by mouth daily     ORDER FOR DME Equipment being ordered: Cane     oxyCODONE (ROXICODONE) 5 MG tablet Take 2.5 mg by mouth every 4 hours as needed for severe pain     potassium chloride ER (K-TAB/KLOR-CON) 10 MEQ CR tablet Take 1 tablet (10 mEq) by mouth 2 times daily     simvastatin (ZOCOR) 20 MG tablet Take 1 tablet (20 mg) by mouth daily     No current facility-administered medications for this visit.     ALLERGIES:   Allergies   Allergen Reactions     Atorvastatin      Fosamax      Niaspan [Niacin]      Omega-3-Acid Ethyl Esters      Zyrtec-D      DIET: Regular, regular texture, thin liquids.  Mighty Shake nutritional supplement.    Vitals:    10/21/21 1144   BP: 125/64   Pulse: 88   Resp:  "18   Temp: 97.6  F (36.4  C)   SpO2: 95%   Weight: 54.9 kg (121 lb)   Height: 1.499 m (4' 11\")     Body mass index is 24.44 kg/m .    EXAMINATION:   General: Pleasant, alert, and conversant generally female, lying in bed, in no apparent distress.  Head: Normocephalic and atraumatic.   Eyes: PERRLA, sclerae clear.   ENT: Moist oral mucosa, with the lips are somewhat dry.  She has her own teeth.  No rhinorrhea or nasal discharge.  Hearing is adequate for conversation in a quiet room.  Cardiovascular: Regular rate and rhythm without appreciable murmur.   Respiratory: Lungs clear to auscultation bilaterally.   Abdomen: Nondistended.   Musculoskeletal/Extremities: Age-related degenerative joint disease.  Bilateral hallux valgus deformities, right greater than left with great toe crossover.  No peripheral edema.  Integument: No rashes, clinically significant lesions, or skin breakdown.  There are, however, fungal toenails.  Cognitive/Psychiatric: Appears alert and oriented, although cognitive evaluation is pending.  Affect is euthymic.    DIAGNOSTICS:   No results found for this or any previous visit (from the past 240 hour(s)).   Last Comprehensive Metabolic Panel:  Sodium   Date Value Ref Range Status   10/09/2021 138 136 - 145 mmol/L Final   12/14/2020 132.2 (L) 132.6 - 141.4 mmol/L Final     Potassium   Date Value Ref Range Status   10/09/2021 4.0 3.5 - 5.0 mmol/L Final   12/14/2020 3.2 (L) 3.3 - 4.5 mmol/L Final     Chloride   Date Value Ref Range Status   10/09/2021 104 98 - 107 mmol/L Final   12/14/2020 97.4 (L) 98.0 - 110.0 mmol/L Final     Carbon Dioxide   Date Value Ref Range Status   12/14/2020 29.8 20.0 - 32.0 mmol/L Final     Carbon Dioxide (CO2)   Date Value Ref Range Status   10/09/2021 24 22 - 31 mmol/L Final     Anion Gap   Date Value Ref Range Status   10/09/2021 10 5 - 18 mmol/L Final   12/15/2014 4 3 - 14 mmol/L Final     Glucose   Date Value Ref Range Status   10/09/2021 94 70 - 125 mg/dL Final "   12/14/2020 92.4 70.0 - 99.0 mg'dL Final     Urea Nitrogen   Date Value Ref Range Status   10/09/2021 33 (H) 8 - 28 mg/dL Final   12/14/2020 16.4 7.0 - 19.0 mg/dL Final     Creatinine   Date Value Ref Range Status   10/09/2021 0.81 0.60 - 1.10 mg/dL Final   12/14/2020 0.9 0.5 - 1.0 mg/dL Final     GFR Estimate   Date Value Ref Range Status   10/09/2021 65 >60 mL/min/1.73m2 Final     Comment:     As of July 11, 2021, eGFR is calculated by the CKD-EPI creatinine equation, without race adjustment. eGFR can be influenced by muscle mass, exercise, and diet. The reported eGFR is an estimation only and is only applicable if the renal function is stable.   12/14/2020 67.0 >60.0 mL/min/1.7 m2 Final     Calcium   Date Value Ref Range Status   10/09/2021 9.3 8.5 - 10.5 mg/dL Final   12/14/2020 9.7 8.5 - 10.1 mg/dL Final       ASSESSMENT/Plan:      ICD-10-CM    1. Fall at home, subsequent encounter  W19.XXXD     Y92.009    2. Multiple fractures of ribs of right side (6 through 9) with routine healing  S22.41XD    3. Weakness of both lower extremities  R29.898    4. Mild cognitive impairment  G31.84    5. Essential hypertension, benign goal <150  I10    6. Dyslipidemia  E78.5    7. Hereditary and idiopathic peripheral neuropathy  G60.9    8. Moderate persistent asthma, unspecified whether complicated  J45.40    9. Diaphragmatic hernia without obstruction and without gangrene  K44.9    10. Acquired hypothyroidism  E03.9        CHANGES:    None.    CARE PLAN:    The care plan, medications, vital signs, orders, and nursing notes have been reviewed, and all orders signed. Changes to care plan, if any, as noted. Otherwise, continue current plan of care.    The above has been created using voice recognition software. Please be aware that this may unintentionally  produce inaccuracies and/or nonsensical sentences.      Electronically signed by: ALLIE Freeman CNP        Sincerely,        ALLIE Freeman  CNP

## 2021-10-23 VITALS
RESPIRATION RATE: 18 BRPM | TEMPERATURE: 97.6 F | OXYGEN SATURATION: 95 % | SYSTOLIC BLOOD PRESSURE: 125 MMHG | HEIGHT: 59 IN | HEART RATE: 88 BPM | DIASTOLIC BLOOD PRESSURE: 64 MMHG | WEIGHT: 121 LBS | BODY MASS INDEX: 24.39 KG/M2

## 2021-10-23 PROBLEM — G31.84 MILD COGNITIVE IMPAIRMENT: Status: ACTIVE | Noted: 2021-10-23

## 2021-10-24 NOTE — PROGRESS NOTES
[facility]    Name:   Brooke Xie  :   1933  MRN:    [id number]     Facility:   [facility] ([facility]) [[id number]]   Room:   Code Status: DNR and POLST AVAILABLE -     DOS:  10/21/2021  Previous visit: 10/18/2021    PCP:  Martin Walton    CHIEF COMPLAINT / REASON FOR VISIT:  Chief Complaint   Patient presents with     Clinic Care Coordination - Follow-up     fall and multiple rib fractures      [facility] from 10/05/2021 until 10/06/2021 (lower extremity weakness, multiple fractures of right ribs)      HPI: Brokoe is a 88 year old female with history of essential hypertension, dyslipidemia, hypothyroidism, and asthma who was admitted on 10/05/2021 following presentation to [facility] ED for evaluation of fall at home.     She awoke on the morning of admission to use the bathroom when her walker tipped ove, r causing her to fall backwards, hitting her right mid-back on some furniture on the way down. Endorsed pain at the site, denied pain elsewhere. No head injury or LOC.  He did report that she had had several falls in week prior.      In the ED, the sheath was hypertensive to 200/116, otherwise vitally stable. Labs notable for K 2.6, PCO2 52. Negative troponin and creatinine WNL. EKG sinus rhythm with premature atrial complexes; no acute changes. XR of right ribs demonstrated multiple fractures. CT chest with displaced fractures involving the right 7th, 8th, and 9th posterior ribs, undisplaced fracture of right 6th posterior rib. Cervical spine and Head CT with no acute changes. Patient given KCl and Tylenol and admitted for further management.     On admission, purewick placed due to incontinence (only utilized in the hospital).  Will surgery consulted and will did not see indication for further intervention. Given lidocaine patch for upper right back. 1 dose labetalol given for elevated blood pressure with improvement to 178/84.  Hydrochlorothiazide stopped  "and amlodipine added at the time of discharge which should help hypokalemia.     The patient symptomatically improved and was subsequently discharged to to the TCU in stable condition on 10/06/2021. Medication changes listed below. Patient will follow up with her PCP in 3-5 days.       CURRENT/RECENT TCU ISSUES    Disposition: I did receive a request for SLP to evaluate for cognition.  My initial time spent with her seemed to indicate that she was alert and oriented.  However, the perception was different again today.  At my last visit, she asked, \"I knew where you?\"  She also did not adjust the bed before sitting on the edge to have breakfast, and she was tilting precariously while eating breakfast.  TODAY, the SLP was in the room, and we discussed this further, confirming what are now clearer deficits.    Lives alone in an apartment with mostly elderly people.  Today, she she indicates she would like to go home to her .  Her  is 72 years old and blind.  She decided to call her  while I was still in the middle of my visit with her.    ROS:   No headaches, nausea or vomiting, dizziness or dyspnea, dysuria, constipation or diarrhea, difficulty chewing or swallowing, integumentary issues, or problems with appetite or sleep.    Past Medical History:   Diagnosis Date     Asthma      CRI (chronic renal insufficiency), stage 3 (moderate) (H) 2020     HLD (hyperlipidemia)      HTN (hypertension)      Hypokalemia      Hypothyroidism      Multiple fractures of ribs of right side (6 through 9) with routine healing 10/14/2021     UTI (urinary tract infection)      Weakness of both lower extremities               Family History   Problem Relation Age of Onset     Cancer - colorectal Father          age 94     C.A.D. Father          age 94     Melanoma Mother      Breast Cancer Sister          age 54     Cancer Father         colorectal      Coronary Artery Disease Father      Breast Cancer " Sister      Social History     Socioeconomic History     Marital status:      Spouse name: None     Number of children: None     Years of education: None     Highest education level: None   Occupational History     None   Tobacco Use     Smoking status: Never Smoker     Smokeless tobacco: Never Used   Substance and Sexual Activity     Alcohol use: No     Drug use: No     Sexual activity: None   Other Topics Concern     Parent/sibling w/ CABG, MI or angioplasty before 65F 55M? Not Asked   Social History Narrative     None     Social Determinants of Health     Financial Resource Strain:      Difficulty of Paying Living Expenses:    Food Insecurity:      Worried About Running Out of Food in the Last Year:      Ran Out of Food in the Last Year:    Transportation Needs:      Lack of Transportation (Medical):      Lack of Transportation (Non-Medical):    Physical Activity:      Days of Exercise per Week:      Minutes of Exercise per Session:    Stress:      Feeling of Stress :    Social Connections:      Frequency of Communication with Friends and Family:      Frequency of Social Gatherings with Friends and Family:      Attends Mandaen Services:      Active Member of Clubs or Organizations:      Attends Club or Organization Meetings:      Marital Status:    Intimate Partner Violence:      Fear of Current or Ex-Partner:      Emotionally Abused:      Physically Abused:      Sexually Abused:        MEDICATIONS: Reviewed from the MAR, physician orders, and/or earlier progress notes.  Post Discharge Medication Reconciliation Status: medication reconcilation previously completed during another office visit.    Current Outpatient Medications   Medication Sig     acetaminophen (TYLENOL) 500 MG tablet Take 1,000 mg by mouth every 6 hours as needed     albuterol (VENTOLIN HFA) 108 (90 Base) MCG/ACT inhaler Inhale 2 puffs into the lungs 4 times daily as needed     amLODIPine (NORVASC) 5 MG tablet Take 5 mg by mouth daily  "    calcium citrate 250 MG TABS Take 1 tablet by mouth daily     Calcium-Vitamin D (CALTRATE 600 PLUS-VIT D OR) Take 1 tablet by mouth daily. (Patient not taking: Reported on 10/13/2021)     cholecalciferol 25 MCG (1000 UT) TABS Take 1 tablet by mouth daily (Patient not taking: Reported on 10/13/2021)     co-enzyme Q-10 30 MG CAPS capsule Take 1 capsule (30 mg) by mouth daily (Patient not taking: Reported on 10/13/2021)     coenzyme Q-10 200 MG CAPS Take 1 tablet by mouth daily     fluticasone-salmeterol (ADVAIR DISKUS) 100-50 MCG/DOSE inhaler Inhale 1 puff into the lungs every 12 hours     hydrochlorothiazide (HYDRODIURIL) 12.5 MG tablet Take 2 tablets (25 mg) by mouth daily (Patient not taking: Reported on 10/13/2021)     ibuprofen (ADVIL/MOTRIN) 400 MG tablet Take 400 mg by mouth every 6 hours as needed for moderate pain     levothyroxine (SYNTHROID/LEVOTHROID) 50 MCG tablet Take 1 tablet (50 mcg) by mouth daily     Lidocaine (LIDOCARE) 4 % Patch Place 1 patch onto the skin every 24 hours To prevent lidocaine toxicity, patient should be patch free for 12 hrs daily.     multivitamin (CENTRUM SILVER) tablet Take 1 tablet by mouth daily     ORDER FOR DME Equipment being ordered: Cane     oxyCODONE (ROXICODONE) 5 MG tablet Take 2.5 mg by mouth every 4 hours as needed for severe pain     potassium chloride ER (K-TAB/KLOR-CON) 10 MEQ CR tablet Take 1 tablet (10 mEq) by mouth 2 times daily     simvastatin (ZOCOR) 20 MG tablet Take 1 tablet (20 mg) by mouth daily     No current facility-administered medications for this visit.     ALLERGIES:   Allergies   Allergen Reactions     Atorvastatin      Fosamax      Niaspan [Niacin]      Omega-3-Acid Ethyl Esters      Zyrtec-D      DIET: Regular, regular texture, thin liquids.  Mighty Shake nutritional supplement.    Vitals:    10/21/21 1144   BP: 125/64   Pulse: 88   Resp: 18   Temp: 97.6  F (36.4  C)   SpO2: 95%   Weight: 54.9 kg (121 lb)   Height: 1.499 m (4' 11\")     Body " mass index is 24.44 kg/m .    EXAMINATION:   General: Pleasant, alert, and conversant generally female, lying in bed, in no apparent distress.  Head: Normocephalic and atraumatic.   Eyes: PERRLA, sclerae clear.   ENT: Moist oral mucosa, with the lips are somewhat dry.  She has her own teeth.  No rhinorrhea or nasal discharge.  Hearing is adequate for conversation in a quiet room.  Cardiovascular: Regular rate and rhythm without appreciable murmur.   Respiratory: Lungs clear to auscultation bilaterally.   Abdomen: Nondistended.   Musculoskeletal/Extremities: Age-related degenerative joint disease.  Bilateral hallux valgus deformities, right greater than left with great toe crossover.  No peripheral edema.  Integument: No rashes, clinically significant lesions, or skin breakdown.  There are, however, fungal toenails.  Cognitive/Psychiatric: Appears alert and oriented, although cognitive evaluation is pending.  Affect is euthymic.    DIAGNOSTICS:   No results found for this or any previous visit (from the past 240 hour(s)).   Last Comprehensive Metabolic Panel:  Sodium   Date Value Ref Range Status   10/09/2021 138 136 - 145 mmol/L Final   12/14/2020 132.2 (L) 132.6 - 141.4 mmol/L Final     Potassium   Date Value Ref Range Status   10/09/2021 4.0 3.5 - 5.0 mmol/L Final   12/14/2020 3.2 (L) 3.3 - 4.5 mmol/L Final     Chloride   Date Value Ref Range Status   10/09/2021 104 98 - 107 mmol/L Final   12/14/2020 97.4 (L) 98.0 - 110.0 mmol/L Final     Carbon Dioxide   Date Value Ref Range Status   12/14/2020 29.8 20.0 - 32.0 mmol/L Final     Carbon Dioxide (CO2)   Date Value Ref Range Status   10/09/2021 24 22 - 31 mmol/L Final     Anion Gap   Date Value Ref Range Status   10/09/2021 10 5 - 18 mmol/L Final   12/15/2014 4 3 - 14 mmol/L Final     Glucose   Date Value Ref Range Status   10/09/2021 94 70 - 125 mg/dL Final   12/14/2020 92.4 70.0 - 99.0 mg'dL Final     Urea Nitrogen   Date Value Ref Range Status   10/09/2021 33 (H)  8 - 28 mg/dL Final   12/14/2020 16.4 7.0 - 19.0 mg/dL Final     Creatinine   Date Value Ref Range Status   10/09/2021 0.81 0.60 - 1.10 mg/dL Final   12/14/2020 0.9 0.5 - 1.0 mg/dL Final     GFR Estimate   Date Value Ref Range Status   10/09/2021 65 >60 mL/min/1.73m2 Final     Comment:     As of July 11, 2021, eGFR is calculated by the CKD-EPI creatinine equation, without race adjustment. eGFR can be influenced by muscle mass, exercise, and diet. The reported eGFR is an estimation only and is only applicable if the renal function is stable.   12/14/2020 67.0 >60.0 mL/min/1.7 m2 Final     Calcium   Date Value Ref Range Status   10/09/2021 9.3 8.5 - 10.5 mg/dL Final   12/14/2020 9.7 8.5 - 10.1 mg/dL Final       ASSESSMENT/Plan:      ICD-10-CM    1. Fall at home, subsequent encounter  W19.XXXD     Y92.009    2. Multiple fractures of ribs of right side (6 through 9) with routine healing  S22.41XD    3. Weakness of both lower extremities  R29.898    4. Mild cognitive impairment  G31.84    5. Essential hypertension, benign goal <150  I10    6. Dyslipidemia  E78.5    7. Hereditary and idiopathic peripheral neuropathy  G60.9    8. Moderate persistent asthma, unspecified whether complicated  J45.40    9. Diaphragmatic hernia without obstruction and without gangrene  K44.9    10. Acquired hypothyroidism  E03.9        CHANGES:    None.    CARE PLAN:    The care plan, medications, vital signs, orders, and nursing notes have been reviewed, and all orders signed. Changes to care plan, if any, as noted. Otherwise, continue current plan of care.    The above has been created using voice recognition software. Please be aware that this may unintentionally  produce inaccuracies and/or nonsensical sentences.      Electronically signed by: ALLIE Freeman CNP

## 2021-10-24 NOTE — PROGRESS NOTES
[facility]    Name:   Brooke Xie  :   1933  MRN:    [id number]     Facility:   [facility] ([facility]) [[id number]]   Room:   Code Status: DNR and POLST AVAILABLE -     DOS:  10/18/2021  Previous visit: 10/13/2021    PCP:  Martin Walton    CHIEF COMPLAINT / REASON FOR VISIT:  Chief Complaint   Patient presents with     Clinic Care Coordination - Follow-up     Fall and multiple rib fractures      [facility] from 10/05/2021 until 10/06/2021 (lower extremity weakness, multiple fractures of right ribs)      HPI: Brooke is a 88 year old female with history of essential hypertension, dyslipidemia, hypothyroidism, and asthma who was admitted on 10/05/2021 following presentation to [facility] ED for evaluation of fall at home.     She awoke on the morning of admission to use the bathroom when her walker tipped ove, r causing her to fall backwards, hitting her right mid-back on some furniture on the way down. Endorsed pain at the site, denied pain elsewhere. No head injury or LOC.  He did report that she had had several falls in week prior.      In the ED, the sheath was hypertensive to 200/116, otherwise vitally stable. Labs notable for K 2.6, PCO2 52. Negative troponin and creatinine WNL. EKG sinus rhythm with premature atrial complexes; no acute changes. XR of right ribs demonstrated multiple fractures. CT chest with displaced fractures involving the right 7th, 8th, and 9th posterior ribs, undisplaced fracture of right 6th posterior rib. Cervical spine and Head CT with no acute changes. Patient given KCl and Tylenol and admitted for further management.     On admission, purewick placed due to incontinence (only utilized in the hospital).  Will surgery consulted and will did not see indication for further intervention. Given lidocaine patch for upper right back. 1 dose labetalol given for elevated blood pressure with improvement to 178/84.  Hydrochlorothiazide stopped  "and amlodipine added at the time of discharge which should help hypokalemia.     The patient symptomatically improved and was subsequently discharged to to the TCU in stable condition on 10/06/2021. Medication changes listed below. Patient will follow up with her PCP in 3-5 days.       CURRENT/RECENT TCU ISSUES    Disposition: I did receive a request for SLP to evaluate for cognition.  My initial time spent with her seemed to indicate that she was alert and oriented.  However, the perception is different today.  She asks, \"I knew where you?\"  She also did not adjust the bed before sitting on the edge to have breakfast, and she was tilting precariously while eating breakfast.      Lives alone in an apartment with mostly elderly people.  At this juncture, she states that she is doing well and that pain is \"good.\"  She is not asking for any as needed pain medications.      ROS:   No headaches, nausea or vomiting, dizziness or dyspnea, dysuria, constipation or diarrhea, difficulty chewing or swallowing, integumentary issues, or problems with appetite or sleep.    Past Medical History:   Diagnosis Date     Asthma      CRI (chronic renal insufficiency), stage 3 (moderate) (H) 2020     HLD (hyperlipidemia)      HTN (hypertension)      Hypokalemia      Hypothyroidism      Multiple fractures of ribs of right side (6 through 9) with routine healing 10/14/2021     UTI (urinary tract infection)      Weakness of both lower extremities               Family History   Problem Relation Age of Onset     Cancer - colorectal Father          age 94     C.A.D. Father          age 94     Melanoma Mother      Breast Cancer Sister          age 54     Cancer Father         colorectal      Coronary Artery Disease Father      Breast Cancer Sister      Social History     Socioeconomic History     Marital status:      Spouse name: None     Number of children: None     Years of education: None     Highest education level: " None   Occupational History     None   Tobacco Use     Smoking status: Never Smoker     Smokeless tobacco: Never Used   Substance and Sexual Activity     Alcohol use: No     Drug use: No     Sexual activity: None   Other Topics Concern     Parent/sibling w/ CABG, MI or angioplasty before 65F 55M? Not Asked   Social History Narrative     None     Social Determinants of Health     Financial Resource Strain:      Difficulty of Paying Living Expenses:    Food Insecurity:      Worried About Running Out of Food in the Last Year:      Ran Out of Food in the Last Year:    Transportation Needs:      Lack of Transportation (Medical):      Lack of Transportation (Non-Medical):    Physical Activity:      Days of Exercise per Week:      Minutes of Exercise per Session:    Stress:      Feeling of Stress :    Social Connections:      Frequency of Communication with Friends and Family:      Frequency of Social Gatherings with Friends and Family:      Attends Taoist Services:      Active Member of Clubs or Organizations:      Attends Club or Organization Meetings:      Marital Status:    Intimate Partner Violence:      Fear of Current or Ex-Partner:      Emotionally Abused:      Physically Abused:      Sexually Abused:        MEDICATIONS: Reviewed from the MAR, physician orders, and/or earlier progress notes.  Post Discharge Medication Reconciliation Status: medication reconcilation previously completed during another office visit.    Current Outpatient Medications   Medication Sig     acetaminophen (TYLENOL) 500 MG tablet Take 1,000 mg by mouth every 6 hours as needed     albuterol (VENTOLIN HFA) 108 (90 Base) MCG/ACT inhaler Inhale 2 puffs into the lungs 4 times daily as needed     amLODIPine (NORVASC) 5 MG tablet Take 5 mg by mouth daily     calcium citrate 250 MG TABS Take 1 tablet by mouth daily     Calcium-Vitamin D (CALTRATE 600 PLUS-VIT D OR) Take 1 tablet by mouth daily. (Patient not taking: Reported on 10/13/2021)      "cholecalciferol 25 MCG (1000 UT) TABS Take 1 tablet by mouth daily (Patient not taking: Reported on 10/13/2021)     co-enzyme Q-10 30 MG CAPS capsule Take 1 capsule (30 mg) by mouth daily (Patient not taking: Reported on 10/13/2021)     coenzyme Q-10 200 MG CAPS Take 1 tablet by mouth daily     fluticasone-salmeterol (ADVAIR DISKUS) 100-50 MCG/DOSE inhaler Inhale 1 puff into the lungs every 12 hours     hydrochlorothiazide (HYDRODIURIL) 12.5 MG tablet Take 2 tablets (25 mg) by mouth daily (Patient not taking: Reported on 10/13/2021)     ibuprofen (ADVIL/MOTRIN) 400 MG tablet Take 400 mg by mouth every 6 hours as needed for moderate pain     levothyroxine (SYNTHROID/LEVOTHROID) 50 MCG tablet Take 1 tablet (50 mcg) by mouth daily     Lidocaine (LIDOCARE) 4 % Patch Place 1 patch onto the skin every 24 hours To prevent lidocaine toxicity, patient should be patch free for 12 hrs daily.     multivitamin (CENTRUM SILVER) tablet Take 1 tablet by mouth daily     ORDER FOR DME Equipment being ordered: Cane     oxyCODONE (ROXICODONE) 5 MG tablet Take 2.5 mg by mouth every 4 hours as needed for severe pain     potassium chloride ER (K-TAB/KLOR-CON) 10 MEQ CR tablet Take 1 tablet (10 mEq) by mouth 2 times daily     simvastatin (ZOCOR) 20 MG tablet Take 1 tablet (20 mg) by mouth daily     No current facility-administered medications for this visit.     ALLERGIES:   Allergies   Allergen Reactions     Atorvastatin      Fosamax      Niaspan [Niacin]      Omega-3-Acid Ethyl Esters      Zyrtec-D      DIET: Regular, regular texture, thin liquids.  Mighty Shake nutritional supplement.    Vitals:    10/18/21 1344   BP: 126/60   Pulse: 86   Resp: 16   Temp: 97.5  F (36.4  C)   SpO2: 96%   Weight: 54.9 kg (121 lb)   Height: 1.499 m (4' 11\")     Body mass index is 24.44 kg/m .    EXAMINATION:   General: Pleasant, alert, and conversant generally female, lying in bed, in no apparent distress.  Head: Normocephalic and atraumatic.   Eyes: " PERRLA, sclerae clear.   ENT: Moist oral mucosa, with the lips are somewhat dry.  She has her own teeth.  No rhinorrhea or nasal discharge.  Hearing is adequate for conversation in a quiet room.  Cardiovascular: Regular rate and rhythm without appreciable murmur.   Respiratory: Lungs clear to auscultation bilaterally.   Abdomen: Nondistended.   Musculoskeletal/Extremities: Age-related degenerative joint disease.  Bilateral hallux valgus deformities, right greater than left with great toe crossover.  No peripheral edema.  Integument: No rashes, clinically significant lesions, or skin breakdown.  There are, however, fungal toenails.  Cognitive/Psychiatric: Appears alert and oriented, although cognitive evaluation is pending.  Affect is euthymic.    DIAGNOSTICS:   No results found for this or any previous visit (from the past 240 hour(s)).   Last Comprehensive Metabolic Panel:  Sodium   Date Value Ref Range Status   10/09/2021 138 136 - 145 mmol/L Final   12/14/2020 132.2 (L) 132.6 - 141.4 mmol/L Final     Potassium   Date Value Ref Range Status   10/09/2021 4.0 3.5 - 5.0 mmol/L Final   12/14/2020 3.2 (L) 3.3 - 4.5 mmol/L Final     Chloride   Date Value Ref Range Status   10/09/2021 104 98 - 107 mmol/L Final   12/14/2020 97.4 (L) 98.0 - 110.0 mmol/L Final     Carbon Dioxide   Date Value Ref Range Status   12/14/2020 29.8 20.0 - 32.0 mmol/L Final     Carbon Dioxide (CO2)   Date Value Ref Range Status   10/09/2021 24 22 - 31 mmol/L Final     Anion Gap   Date Value Ref Range Status   10/09/2021 10 5 - 18 mmol/L Final   12/15/2014 4 3 - 14 mmol/L Final     Glucose   Date Value Ref Range Status   10/09/2021 94 70 - 125 mg/dL Final   12/14/2020 92.4 70.0 - 99.0 mg'dL Final     Urea Nitrogen   Date Value Ref Range Status   10/09/2021 33 (H) 8 - 28 mg/dL Final   12/14/2020 16.4 7.0 - 19.0 mg/dL Final     Creatinine   Date Value Ref Range Status   10/09/2021 0.81 0.60 - 1.10 mg/dL Final   12/14/2020 0.9 0.5 - 1.0 mg/dL Final      GFR Estimate   Date Value Ref Range Status   10/09/2021 65 >60 mL/min/1.73m2 Final     Comment:     As of July 11, 2021, eGFR is calculated by the CKD-EPI creatinine equation, without race adjustment. eGFR can be influenced by muscle mass, exercise, and diet. The reported eGFR is an estimation only and is only applicable if the renal function is stable.   12/14/2020 67.0 >60.0 mL/min/1.7 m2 Final     Calcium   Date Value Ref Range Status   10/09/2021 9.3 8.5 - 10.5 mg/dL Final   12/14/2020 9.7 8.5 - 10.1 mg/dL Final       ASSESSMENT/Plan:      ICD-10-CM    1. Fall at home, subsequent encounter  W19.XXXD     Y92.009    2. Multiple fractures of ribs of right side (6 through 9) with routine healing  S22.41XD    3. Weakness of both lower extremities  R29.898    4. Mild cognitive impairment  G31.84    5. Essential hypertension, benign goal <150  I10    6. Dyslipidemia  E78.5    7. Hereditary and idiopathic peripheral neuropathy  G60.9    8. Moderate persistent asthma, unspecified whether complicated  J45.40    9. Diaphragmatic hernia without obstruction and without gangrene  K44.9    10. Acquired hypothyroidism  E03.9        CHANGES:    None.    CARE PLAN:    The care plan, medications, vital signs, orders, and nursing notes have been reviewed, and all orders signed. Changes to care plan, if any, as noted. Otherwise, continue current plan of care.    The above has been created using voice recognition software. Please be aware that this may unintentionally  produce inaccuracies and/or nonsensical sentences.      Electronically signed by: ALLIE Freeman CNP

## 2021-10-25 ENCOUNTER — TRANSITIONAL CARE UNIT VISIT (OUTPATIENT)
Dept: GERIATRICS | Facility: CLINIC | Age: 86
End: 2021-10-25
Payer: COMMERCIAL

## 2021-10-25 VITALS
HEART RATE: 88 BPM | RESPIRATION RATE: 17 BRPM | TEMPERATURE: 97.3 F | BODY MASS INDEX: 24.39 KG/M2 | DIASTOLIC BLOOD PRESSURE: 77 MMHG | OXYGEN SATURATION: 97 % | WEIGHT: 121 LBS | HEIGHT: 59 IN | SYSTOLIC BLOOD PRESSURE: 151 MMHG

## 2021-10-25 DIAGNOSIS — E03.9 ACQUIRED HYPOTHYROIDISM: ICD-10-CM
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
## 2021-10-25 DIAGNOSIS — J45.40 MODERATE PERSISTENT ASTHMA, UNSPECIFIED WHETHER COMPLICATED: ICD-10-CM

## 2021-10-25 DIAGNOSIS — I10 ESSENTIAL HYPERTENSION, BENIGN: ICD-10-CM

## 2021-10-25 DIAGNOSIS — E78.5 DYSLIPIDEMIA: ICD-10-CM

## 2021-10-25 DIAGNOSIS — S22.41XD MULTIPLE FRACTURES OF RIBS OF RIGHT SIDE WITH ROUTINE HEALING: ICD-10-CM

## 2021-10-25 DIAGNOSIS — R29.898 WEAKNESS OF BOTH LOWER EXTREMITIES: ICD-10-CM

## 2021-10-25 DIAGNOSIS — G31.84 MILD COGNITIVE IMPAIRMENT: ICD-10-CM

## 2021-10-25 DIAGNOSIS — G60.9 HEREDITARY AND IDIOPATHIC PERIPHERAL NEUROPATHY: ICD-10-CM

## 2021-10-25 DIAGNOSIS — K44.9 DIAPHRAGMATIC HERNIA WITHOUT OBSTRUCTION AND WITHOUT GANGRENE: ICD-10-CM
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
## 2021-10-25 PROCEDURE — 99309 SBSQ NF CARE MODERATE MDM 30: CPT | Performed by: NURSE PRACTITIONER

## 2021-10-25 ASSESSMENT — MIFFLIN-ST. JEOR: SCORE: 884.48

## 2021-10-25 NOTE — LETTER
10/25/2021        RE: Brooke Xie  [address]  [address]  [facility] [address]        [facility]    Name:   Brooke Xie  :   1933  MRN:    [id number]     Facility:   [facility] (SNF) [[id number]]   Room: U 217  Code Status: DNR and POLST AVAILABLE -     DOS:  10/25/2021  Previous visit: 10/21/2021    PCP:  Martin Walton    CHIEF COMPLAINT / REASON FOR VISIT:  Chief Complaint   Patient presents with     Clinic Care Coordination - Follow-up     Fall and multiple rib fractures      [facility] from 10/05/2021 until 10/06/2021 (lower extremity weakness, multiple fractures of right ribs)      HPI: Brooke is a 88 year old female with history of essential hypertension, dyslipidemia, hypothyroidism, and asthma who was admitted on 10/05/2021 following presentation to [facility] ED for evaluation of fall at home.     She awoke on the morning of admission to use the bathroom when her walker tipped ove, r causing her to fall backwards, hitting her right mid-back on some furniture on the way down. Endorsed pain at the site, denied pain elsewhere. No head injury or LOC.  He did report that she had had several falls in week prior.      In the ED, the sheath was hypertensive to 200/116, otherwise vitally stable. Labs notable for K 2.6, PCO2 52. Negative troponin and creatinine WNL. EKG sinus rhythm with premature atrial complexes; no acute changes. XR of right ribs demonstrated multiple fractures. CT chest with displaced fractures involving the right 7th, 8th, and 9th posterior ribs, undisplaced fracture of right 6th posterior rib. Cervical spine and Head CT with no acute changes. Patient given KCl and Tylenol and admitted for further management.     On admission, purewick placed due to incontinence (only utilized in the hospital).  Will surgery consulted and will did not see indication for further intervention. Given lidocaine patch for upper right back.  "1 dose labetalol given for elevated blood pressure with improvement to 178/84.  Hydrochlorothiazide stopped and amlodipine added at the time of discharge which should help hypokalemia.     The patient symptomatically improved and was subsequently discharged to to the TCU in stable condition on 10/06/2021. Medication changes listed below. Patient will follow up with her PCP in 3-5 days.       CURRENT/RECENT TCU ISSUES    Disposition:  My initial time spent with her seemed to indicate that she was alert and oriented; however I did receive a request for SLP to evaluate for cognition, and cognitive deficits are apparent.  At my first visit, she asked, \"And who are you?\"  She also did not adjust the bed before sitting on the edge to have breakfast, and she was tilting precariously while eating breakfast. She has a bit of difficulty with the correct year and, in fact, she believed she was only 80 at first. She did tell me, after discovering she was 88, that she would be happy to live to 89. Interestingly, she remembered my name over the weekend.    Lives in an apartment with mostly elderly people. There is an elevator.  Today, she she indicates she would like to go home to her .  Her  is 72 years old and blind.  She told me, \"nobody told me anything, but I'm leaving around noon tomorrow.\"    Currently, she is ambulating with a 2-wheeled walker, transfer belt, and CGA.    ROS:   She denies any pain. No headaches, nausea or vomiting, dizziness or dyspnea, dysuria, constipation or diarrhea, difficulty chewing or swallowing, integumentary issues, or problems with appetite or sleep.    Past Medical History:   Diagnosis Date     Asthma      CRI (chronic renal insufficiency), stage 3 (moderate) (H) 2/12/2020     HLD (hyperlipidemia)      HTN (hypertension)      Hypokalemia      Hypothyroidism      Multiple fractures of ribs of right side (6 through 9) with routine healing 10/14/2021     UTI (urinary tract " infection)      Weakness of both lower extremities               Family History   Problem Relation Age of Onset     Cancer - colorectal Father          age 94     C.A.D. Father          age 94     Melanoma Mother      Breast Cancer Sister          age 54     Cancer Father         colorectal      Coronary Artery Disease Father      Breast Cancer Sister      Social History     Socioeconomic History     Marital status:      Spouse name: None     Number of children: None     Years of education: None     Highest education level: None   Occupational History     None   Tobacco Use     Smoking status: Never Smoker     Smokeless tobacco: Never Used   Substance and Sexual Activity     Alcohol use: No     Drug use: No     Sexual activity: None   Other Topics Concern     Parent/sibling w/ CABG, MI or angioplasty before 65F 55M? Not Asked   Social History Narrative     None     Social Determinants of Health     Financial Resource Strain:      Difficulty of Paying Living Expenses:    Food Insecurity:      Worried About Running Out of Food in the Last Year:      Ran Out of Food in the Last Year:    Transportation Needs:      Lack of Transportation (Medical):      Lack of Transportation (Non-Medical):    Physical Activity:      Days of Exercise per Week:      Minutes of Exercise per Session:    Stress:      Feeling of Stress :    Social Connections:      Frequency of Communication with Friends and Family:      Frequency of Social Gatherings with Friends and Family:      Attends [employer] Services:      Active Member of Clubs or Organizations:      Attends Club or Organization Meetings:      Marital Status:    Intimate Partner Violence:      Fear of Current or Ex-Partner:      Emotionally Abused:      Physically Abused:      Sexually Abused:        MEDICATIONS: Reviewed from the MAR, physician orders, and/or earlier progress notes.  Post Discharge Medication Reconciliation Status: medication reconcilation  previously completed during another office visit.    Current Outpatient Medications   Medication Sig     acetaminophen (TYLENOL) 500 MG tablet Take 1,000 mg by mouth every 6 hours as needed     albuterol (VENTOLIN HFA) 108 (90 Base) MCG/ACT inhaler Inhale 2 puffs into the lungs 4 times daily as needed     amLODIPine (NORVASC) 5 MG tablet Take 5 mg by mouth daily     calcium citrate 250 MG TABS Take 1 tablet by mouth daily     Calcium-Vitamin D (CALTRATE 600 PLUS-VIT D OR) Take 1 tablet by mouth daily. (Patient not taking: Reported on 10/13/2021)     cholecalciferol 25 MCG (1000 UT) TABS Take 1 tablet by mouth daily (Patient not taking: Reported on 10/13/2021)     co-enzyme Q-10 30 MG CAPS capsule Take 1 capsule (30 mg) by mouth daily (Patient not taking: Reported on 10/13/2021)     coenzyme Q-10 200 MG CAPS Take 1 tablet by mouth daily     fluticasone-salmeterol (ADVAIR DISKUS) 100-50 MCG/DOSE inhaler Inhale 1 puff into the lungs every 12 hours     hydrochlorothiazide (HYDRODIURIL) 12.5 MG tablet Take 2 tablets (25 mg) by mouth daily (Patient not taking: Reported on 10/13/2021)     ibuprofen (ADVIL/MOTRIN) 400 MG tablet Take 400 mg by mouth every 6 hours as needed for moderate pain     levothyroxine (SYNTHROID/LEVOTHROID) 50 MCG tablet Take 1 tablet (50 mcg) by mouth daily     Lidocaine (LIDOCARE) 4 % Patch Place 1 patch onto the skin every 24 hours To prevent lidocaine toxicity, patient should be patch free for 12 hrs daily.     multivitamin (CENTRUM SILVER) tablet Take 1 tablet by mouth daily     ORDER FOR DME Equipment being ordered: Cane     oxyCODONE (ROXICODONE) 5 MG tablet Take 2.5 mg by mouth every 4 hours as needed for severe pain     potassium chloride ER (K-TAB/KLOR-CON) 10 MEQ CR tablet Take 1 tablet (10 mEq) by mouth 2 times daily     simvastatin (ZOCOR) 20 MG tablet Take 1 tablet (20 mg) by mouth daily     No current facility-administered medications for this visit.     ALLERGIES:   Allergies  "  Allergen Reactions     Atorvastatin      Fosamax      Niaspan [Niacin]      Omega-3-Acid Ethyl Esters      Zyrtec-D      DIET: Regular, regular texture, thin liquids.  Mighty Shake nutritional supplement.    Vitals:    10/25/21 2138   BP: (!) 151/77   Pulse: 88   Resp: 17   Temp: 97.3  F (36.3  C)   SpO2: 97%   Weight: 54.9 kg (121 lb)   Height: 1.499 m (4' 11\")     Body mass index is 24.44 kg/m .    EXAMINATION:   General: Pleasant, alert, and conversant generally female, lying in bed, in no apparent distress.  Head: Normocephalic and atraumatic.   Eyes: PERRLA, sclerae clear.   ENT: Moist oral mucosa, with the lips are somewhat dry.  She has her own teeth.  No rhinorrhea or nasal discharge.  Hearing is adequate for conversation in a quiet room.  Cardiovascular: Regular rate and rhythm without appreciable murmur.   Respiratory: Lungs clear to auscultation bilaterally.   Abdomen: Nondistended.   Musculoskeletal/Extremities: Age-related degenerative joint disease.  Bilateral hallux valgus deformities, right greater than left with great toe crossover.  No peripheral edema.  Integument: No rashes, clinically significant lesions, or skin breakdown.  There are, however, fungal toenails.  Cognitive/Psychiatric: Appears alert and oriented, although there are at least minor cognitive deficits.  Affect is euthymic.    DIAGNOSTICS:   No results found for this or any previous visit (from the past 240 hour(s)).   Last Comprehensive Metabolic Panel:  Sodium   Date Value Ref Range Status   10/09/2021 138 136 - 145 mmol/L Final   12/14/2020 132.2 (L) 132.6 - 141.4 mmol/L Final     Potassium   Date Value Ref Range Status   10/09/2021 4.0 3.5 - 5.0 mmol/L Final   12/14/2020 3.2 (L) 3.3 - 4.5 mmol/L Final     Chloride   Date Value Ref Range Status   10/09/2021 104 98 - 107 mmol/L Final   12/14/2020 97.4 (L) 98.0 - 110.0 mmol/L Final     Carbon Dioxide   Date Value Ref Range Status   12/14/2020 29.8 20.0 - 32.0 mmol/L Final "     Carbon Dioxide (CO2)   Date Value Ref Range Status   10/09/2021 24 22 - 31 mmol/L Final     Anion Gap   Date Value Ref Range Status   10/09/2021 10 5 - 18 mmol/L Final   12/15/2014 4 3 - 14 mmol/L Final     Glucose   Date Value Ref Range Status   10/09/2021 94 70 - 125 mg/dL Final   12/14/2020 92.4 70.0 - 99.0 mg'dL Final     Urea Nitrogen   Date Value Ref Range Status   10/09/2021 33 (H) 8 - 28 mg/dL Final   12/14/2020 16.4 7.0 - 19.0 mg/dL Final     Creatinine   Date Value Ref Range Status   10/09/2021 0.81 0.60 - 1.10 mg/dL Final   12/14/2020 0.9 0.5 - 1.0 mg/dL Final     GFR Estimate   Date Value Ref Range Status   10/09/2021 65 >60 mL/min/1.73m2 Final     Comment:     As of July 11, 2021, eGFR is calculated by the CKD-EPI creatinine equation, without race adjustment. eGFR can be influenced by muscle mass, exercise, and diet. The reported eGFR is an estimation only and is only applicable if the renal function is stable.   12/14/2020 67.0 >60.0 mL/min/1.7 m2 Final     Calcium   Date Value Ref Range Status   10/09/2021 9.3 8.5 - 10.5 mg/dL Final   12/14/2020 9.7 8.5 - 10.1 mg/dL Final       ASSESSMENT/Plan:      ICD-10-CM    1. Fall at home, subsequent encounter  W19.XXXD     Y92.009    2. Multiple fractures of ribs of right side (6 through 9) with routine healing  S22.41XD    3. Weakness of both lower extremities  R29.898    4. Mild cognitive impairment  G31.84    5. Essential hypertension, benign goal <150  I10    6. Dyslipidemia  E78.5    7. Hereditary and idiopathic peripheral neuropathy  G60.9    8. Moderate persistent asthma, unspecified whether complicated  J45.40    9. Diaphragmatic hernia without obstruction and without gangrene  K44.9    10. Acquired hypothyroidism  E03.9        CHANGES:    None.    CARE PLAN:    The care plan, medications, vital signs, orders, and nursing notes have been reviewed, and all orders signed. Changes to care plan, if any, as noted. Otherwise, continue current plan of  care.    The above has been created using voice recognition software. Please be aware that this may unintentionally  produce inaccuracies and/or nonsensical sentences.      Electronically signed by: ALLIE Freeman CNP        Sincerely,        ALLIE Freeman CNP

## 2021-10-27 ENCOUNTER — TRANSITIONAL CARE UNIT VISIT (OUTPATIENT)
Dept: GERIATRICS | Facility: CLINIC | Age: 86
End: 2021-10-27
Payer: COMMERCIAL

## 2021-10-27 VITALS
RESPIRATION RATE: 22 BRPM | DIASTOLIC BLOOD PRESSURE: 76 MMHG | BODY MASS INDEX: 24.39 KG/M2 | TEMPERATURE: 97.5 F | HEART RATE: 84 BPM | HEIGHT: 59 IN | SYSTOLIC BLOOD PRESSURE: 162 MMHG | OXYGEN SATURATION: 95 % | WEIGHT: 121 LBS

## 2021-10-27 DIAGNOSIS — I10 ESSENTIAL HYPERTENSION, BENIGN: ICD-10-CM

## 2021-10-27 DIAGNOSIS — J45.40 MODERATE PERSISTENT ASTHMA, UNSPECIFIED WHETHER COMPLICATED: ICD-10-CM

## 2021-10-27 DIAGNOSIS — G31.84 MILD COGNITIVE IMPAIRMENT: ICD-10-CM
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
## 2021-10-27 DIAGNOSIS — R29.898 WEAKNESS OF BOTH LOWER EXTREMITIES: ICD-10-CM
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
## 2021-10-27 DIAGNOSIS — S22.41XD MULTIPLE FRACTURES OF RIBS OF RIGHT SIDE WITH ROUTINE HEALING: ICD-10-CM

## 2021-10-27 PROCEDURE — 99309 SBSQ NF CARE MODERATE MDM 30: CPT | Performed by: NURSE PRACTITIONER

## 2021-10-27 ASSESSMENT — MIFFLIN-ST. JEOR: SCORE: 884.48

## 2021-10-27 NOTE — LETTER
10/27/2021        RE: Brooke Xie  [address]  [address]  [facility] [address]         [facility]    Code Status: DNR  Visit Type:   Chief Complaint   Patient presents with     Nursing Home Acute     TCU FOLLOW UP     Facility:  [facility] ([facility] [[id number]]           History of Present Illness: Brooke Xie is a 88 year old female 1 seen today for follow-up on the TCU.  Patient recently hospitalized on 10/5/2021 secondary to fall with lower extremity weakness and multiple fractures of the right rib.  Past medical history includes essential hypertension, dyslipidemia, hypothyroidism and asthma.  Patient did have some hypertension during her ER visit.  Potassium was low.  CO2 was high at 52.  Troponins negative.  Creatinine within normal limits.  EKG of normal sinus rhythm with premature atrial complexes.  An x-ray of the right ribs demonstrated multiple fractures.  She did undergo CT chest of the chest with displaced fractures involving the right seventh, eighth and ninth posterior ribs with an undisplaced fracture of the right sixth posterior rib.  Head CT with no acute changes.  Cervical spine CT negative.  Patient underwent potassium replacement.  She was treated for right rib pain with lidocaine patch, Tylenol and oxycodone.  She did receive a dose of labetalol for hypertension.  Her hydrochlorothiazide was stopped and amlodipine added at discharge.    Today patient lying in bed.  I do note some cognitive impairment.  She was seen by the prior NP and a speech therapy order was placed secondary to cognition.  She is alert and oriented x3 however she does not recall having a fall or recall having rib fractures.  She denies any pain in her ribs.  Review of her blood pressures show systolics in the 130s to 160s and diastolics in the 70s to 80s.  Heart rate is normal.  She continues on Norvasc.  She is no longer on hydrochlorothiazide as stated.  Nursing staff  report patient is more alert.  She is moving better per the nursing staff.  She does have underlying asthma.  Continues on Advair.  She denies any shortness of breath.  No chest pain.    Active Ambulatory Problems     Diagnosis Date Noted     Allergic rhinitis 09/05/2012     Moderate persistent asthma 09/05/2012     Essential hypertension, benign goal <150 09/05/2012     Diaphragmatic hernia 09/05/2012     Hyperlipidemia 09/05/2012     Hypothyroidism 09/05/2012     Hereditary and idiopathic peripheral neuropathy 09/05/2012     Health Care Home 09/05/2012     Bleeding (clots slowly) 08/17/2015     Fall at home, subsequent encounter 10/14/2021     Weakness of both lower extremities 10/14/2021     Peripheral polyneuropathy 10/14/2021     Dyslipidemia 10/14/2021     Multiple fractures of ribs of right side (6 through 9) with routine healing 10/14/2021     Mild cognitive impairment 10/23/2021     Resolved Ambulatory Problems     Diagnosis Date Noted     Bite of other animal except arthropod(E906.3) 09/05/2012     Contact dermatitis and other eczema, due to unspecified cause 09/05/2012     Disorder of bone and cartilage 09/05/2012     Past Medical History:   Diagnosis Date     Asthma      CRI (chronic renal insufficiency), stage 3 (moderate) (H) 2/12/2020     HLD (hyperlipidemia)      HTN (hypertension)      Hypokalemia      UTI (urinary tract infection)        Current Outpatient Medications:      acetaminophen (TYLENOL) 500 MG tablet, Take 1,000 mg by mouth every 6 hours as needed, Disp: , Rfl:      albuterol (VENTOLIN HFA) 108 (90 Base) MCG/ACT inhaler, Inhale 2 puffs into the lungs 4 times daily as needed, Disp: , Rfl:      amLODIPine (NORVASC) 5 MG tablet, Take 5 mg by mouth daily, Disp: , Rfl:      calcium citrate 250 MG TABS, Take 1 tablet by mouth daily, Disp: , Rfl:      Calcium-Vitamin D (CALTRATE 600 PLUS-VIT D OR), Take 1 tablet by mouth daily. (Patient not taking: Reported on 10/13/2021), Disp: , Rfl:       cholecalciferol 25 MCG (1000 UT) TABS, Take 1 tablet by mouth daily (Patient not taking: Reported on 10/13/2021), Disp: 90 tablet, Rfl: 0     co-enzyme Q-10 30 MG CAPS capsule, Take 1 capsule (30 mg) by mouth daily (Patient not taking: Reported on 10/13/2021), Disp: 90 capsule, Rfl: 0     coenzyme Q-10 200 MG CAPS, Take 1 tablet by mouth daily, Disp: , Rfl:      fluticasone-salmeterol (ADVAIR DISKUS) 100-50 MCG/DOSE inhaler, Inhale 1 puff into the lungs every 12 hours, Disp: 14 each, Rfl: 0     hydrochlorothiazide (HYDRODIURIL) 12.5 MG tablet, Take 2 tablets (25 mg) by mouth daily (Patient not taking: Reported on 10/13/2021), Disp: 90 tablet, Rfl: 0     ibuprofen (ADVIL/MOTRIN) 400 MG tablet, Take 400 mg by mouth every 6 hours as needed for moderate pain, Disp: , Rfl:      levothyroxine (SYNTHROID/LEVOTHROID) 50 MCG tablet, Take 1 tablet (50 mcg) by mouth daily, Disp: 90 tablet, Rfl: 0     Lidocaine (LIDOCARE) 4 % Patch, Place 1 patch onto the skin every 24 hours To prevent lidocaine toxicity, patient should be patch free for 12 hrs daily., Disp: , Rfl:      multivitamin (CENTRUM SILVER) tablet, Take 1 tablet by mouth daily, Disp: 90 tablet, Rfl: 0     ORDER FOR DME, Equipment being ordered: Cane, Disp: 1 Units, Rfl: 0     oxyCODONE (ROXICODONE) 5 MG tablet, Take 2.5 mg by mouth every 4 hours as needed for severe pain, Disp: , Rfl:      potassium chloride ER (K-TAB/KLOR-CON) 10 MEQ CR tablet, Take 1 tablet (10 mEq) by mouth 2 times daily, Disp: 180 tablet, Rfl: 0     simvastatin (ZOCOR) 20 MG tablet, Take 1 tablet (20 mg) by mouth daily, Disp: 90 tablet, Rfl: 0  Allergies   Allergen Reactions     Atorvastatin      Fosamax      Niaspan [Niacin]      Omega-3-Acid Ethyl Esters      Zyrtec-D        All Meds and Allergies reviewed in the record at the facility and is the most up-to-date    REVIEW OF SYSTEMS:   Review of Systems  Patient is a poor historian.  No fevers or chills. No headache, lightheadedness or dizziness.  "No SOB, chest pains or palpitations. Appetite is good. No nausea, vomiting, constipation or diarrhea. No dysuria, frequency, burning or pain with urination.  Patient denies any pain in her ribs.  Otherwise review of systems are negative.     PHYSICAL EXAMINATION:  Physical Exam     Vital signs: BP (!) 162/76   Pulse 84   Temp 97.5  F (36.4  C)   Resp 22   Ht 1.499 m (4' 11\")   Wt 54.9 kg (121 lb)   SpO2 95%   BMI 24.44 kg/m    General: Awake, Alert, oriented x3, appropriately, follows simple commands, conversant  HEENT:Pink conjunctiva, anicteric sclerae, moist oral mucosa  NECK: Supple, without any lymphadenopathy, or masses  CVS:  S1  S2, without murmur or gallop.  No pain with tenderness in the rib cage.  LUNG: Clear to auscultation, No wheezes, rales or rhonci.  BACK: No kyphosis of the thoracic spine  ABDOMEN: Soft, nontender to palpation, with positive bowel sounds  EXTREMITIES: Moves both upper and lower extremities with generalized weakness, no pedal edema, no calf tenderness.  Thin and frail.  SKIN: Warm and dry, no rashes or erythema noted  NEUROLOGIC: Intact, pulses palpable  PSYCHIATRIC: Cognitive impairment with poor recall.      Labs:  All labs reviewed in the nursing home record and Epic   @  Lab Results   Component Value Date    WBC 4.9 06/12/2012     Lab Results   Component Value Date    RBC 4.12 06/12/2012     Lab Results   Component Value Date    HGB 13.1 06/12/2012     Lab Results   Component Value Date    HCT 38.5 06/12/2012     Lab Results   Component Value Date    MCV 93 06/12/2012     Lab Results   Component Value Date    MCH 31.8 06/12/2012     Lab Results   Component Value Date    MCHC 34.0 06/12/2012     Lab Results   Component Value Date    RDW 13.7 06/12/2012     Lab Results   Component Value Date     06/12/2012        @Last Comprehensive Metabolic Panel:  Sodium   Date Value Ref Range Status   10/09/2021 138 136 - 145 mmol/L Final   12/14/2020 132.2 (L) 132.6 - 141.4 " mmol/L Final     Potassium   Date Value Ref Range Status   10/09/2021 4.0 3.5 - 5.0 mmol/L Final   12/14/2020 3.2 (L) 3.3 - 4.5 mmol/L Final     Chloride   Date Value Ref Range Status   10/09/2021 104 98 - 107 mmol/L Final   12/14/2020 97.4 (L) 98.0 - 110.0 mmol/L Final     Carbon Dioxide   Date Value Ref Range Status   12/14/2020 29.8 20.0 - 32.0 mmol/L Final     Carbon Dioxide (CO2)   Date Value Ref Range Status   10/09/2021 24 22 - 31 mmol/L Final     Anion Gap   Date Value Ref Range Status   10/09/2021 10 5 - 18 mmol/L Final   12/15/2014 4 3 - 14 mmol/L Final     Glucose   Date Value Ref Range Status   10/09/2021 94 70 - 125 mg/dL Final   12/14/2020 92.4 70.0 - 99.0 mg'dL Final     Urea Nitrogen   Date Value Ref Range Status   10/09/2021 33 (H) 8 - 28 mg/dL Final   12/14/2020 16.4 7.0 - 19.0 mg/dL Final     Creatinine   Date Value Ref Range Status   10/09/2021 0.81 0.60 - 1.10 mg/dL Final   12/14/2020 0.9 0.5 - 1.0 mg/dL Final     GFR Estimate   Date Value Ref Range Status   10/09/2021 65 >60 mL/min/1.73m2 Final     Comment:     As of July 11, 2021, eGFR is calculated by the CKD-EPI creatinine equation, without race adjustment. eGFR can be influenced by muscle mass, exercise, and diet. The reported eGFR is an estimation only and is only applicable if the renal function is stable.   12/14/2020 67.0 >60.0 mL/min/1.7 m2 Final     Calcium   Date Value Ref Range Status   10/09/2021 9.3 8.5 - 10.5 mg/dL Final   12/14/2020 9.7 8.5 - 10.1 mg/dL Final         Assessment/Plan:    ICD-10-CM    1. Multiple fractures of ribs of right side (6 through 9) with routine healing  S22.41XD  patient denies any pain in her ribs on today's visit.  She continues with Tylenol for pain.   2. Fall at home, subsequent encounter  W19.XXXD  patient continues in therapy.    Y92.009    3. Weakness of both lower extremities  R29.898    4. Essential hypertension, benign goal <150  I10  patient continues on amlodipine.  Continue to monitor BP.   Patient no longer on hydrochlorothiazide.   5. Mild cognitive impairment  G31.84  continues in speech therapy.  Poor recall noted on visit.   6. Moderate persistent asthma, unspecified whether complicated  J45.40  well-controlled with Advair.  Patient denies any shortness of breath.         This note has been dictated using voice recognition software. Any grammatical or context distortions are unintentional and inherent to the software    Electronically signed by: Maritza Gu CNP           Sincerely,        Maritza Gu NP

## 2021-10-28 ENCOUNTER — TELEPHONE (OUTPATIENT)
Dept: GERIATRICS | Facility: CLINIC | Age: 86
End: 2021-10-28

## 2021-10-28 NOTE — PROGRESS NOTES
[facility] GERIATRIC SERVICES    Code Status: DNR  Visit Type:   Chief Complaint   Patient presents with     Nursing Home Acute     TCU FOLLOW UP     Facility:  [facility] ([facility]) [[id number]]           History of Present Illness: Brooke Xie is a 88 year old female 1 seen today for follow-up on the TCU.  Patient recently hospitalized on 10/5/2021 secondary to fall with lower extremity weakness and multiple fractures of the right rib.  Past medical history includes essential hypertension, dyslipidemia, hypothyroidism and asthma.  Patient did have some hypertension during her ER visit.  Potassium was low.  CO2 was high at 52.  Troponins negative.  Creatinine within normal limits.  EKG of normal sinus rhythm with premature atrial complexes.  An x-ray of the right ribs demonstrated multiple fractures.  She did undergo CT chest of the chest with displaced fractures involving the right seventh, eighth and ninth posterior ribs with an undisplaced fracture of the right sixth posterior rib.  Head CT with no acute changes.  Cervical spine CT negative.  Patient underwent potassium replacement.  She was treated for right rib pain with lidocaine patch, Tylenol and oxycodone.  She did receive a dose of labetalol for hypertension.  Her hydrochlorothiazide was stopped and amlodipine added at discharge.    Today patient lying in bed.  I do note some cognitive impairment.  She was seen by the prior NP and a speech therapy order was placed secondary to cognition.  She is alert and oriented x3 however she does not recall having a fall or recall having rib fractures.  She denies any pain in her ribs.  Review of her blood pressures show systolics in the 130s to 160s and diastolics in the 70s to 80s.  Heart rate is normal.  She continues on Norvasc.  She is no longer on hydrochlorothiazide as stated.  Nursing staff report patient is more alert.  She is moving better per the nursing staff.  She does have underlying asthma.   Continues on Advair.  She denies any shortness of breath.  No chest pain.    Active Ambulatory Problems     Diagnosis Date Noted     Allergic rhinitis 09/05/2012     Moderate persistent asthma 09/05/2012     Essential hypertension, benign goal <150 09/05/2012     Diaphragmatic hernia 09/05/2012     Hyperlipidemia 09/05/2012     Hypothyroidism 09/05/2012     Hereditary and idiopathic peripheral neuropathy 09/05/2012     Health Care Home 09/05/2012     Bleeding (clots slowly) 08/17/2015     Fall at home, subsequent encounter 10/14/2021     Weakness of both lower extremities 10/14/2021     Peripheral polyneuropathy 10/14/2021     Dyslipidemia 10/14/2021     Multiple fractures of ribs of right side (6 through 9) with routine healing 10/14/2021     Mild cognitive impairment 10/23/2021     Resolved Ambulatory Problems     Diagnosis Date Noted     Bite of other animal except arthropod(E906.3) 09/05/2012     Contact dermatitis and other eczema, due to unspecified cause 09/05/2012     Disorder of bone and cartilage 09/05/2012     Past Medical History:   Diagnosis Date     Asthma      CRI (chronic renal insufficiency), stage 3 (moderate) (H) 2/12/2020     HLD (hyperlipidemia)      HTN (hypertension)      Hypokalemia      UTI (urinary tract infection)        Current Outpatient Medications:      acetaminophen (TYLENOL) 500 MG tablet, Take 1,000 mg by mouth every 6 hours as needed, Disp: , Rfl:      albuterol (VENTOLIN HFA) 108 (90 Base) MCG/ACT inhaler, Inhale 2 puffs into the lungs 4 times daily as needed, Disp: , Rfl:      amLODIPine (NORVASC) 5 MG tablet, Take 5 mg by mouth daily, Disp: , Rfl:      calcium citrate 250 MG TABS, Take 1 tablet by mouth daily, Disp: , Rfl:      Calcium-Vitamin D (CALTRATE 600 PLUS-VIT D OR), Take 1 tablet by mouth daily. (Patient not taking: Reported on 10/13/2021), Disp: , Rfl:      cholecalciferol 25 MCG (1000 UT) TABS, Take 1 tablet by mouth daily (Patient not taking: Reported on 10/13/2021),  Disp: 90 tablet, Rfl: 0     co-enzyme Q-10 30 MG CAPS capsule, Take 1 capsule (30 mg) by mouth daily (Patient not taking: Reported on 10/13/2021), Disp: 90 capsule, Rfl: 0     coenzyme Q-10 200 MG CAPS, Take 1 tablet by mouth daily, Disp: , Rfl:      fluticasone-salmeterol (ADVAIR DISKUS) 100-50 MCG/DOSE inhaler, Inhale 1 puff into the lungs every 12 hours, Disp: 14 each, Rfl: 0     hydrochlorothiazide (HYDRODIURIL) 12.5 MG tablet, Take 2 tablets (25 mg) by mouth daily (Patient not taking: Reported on 10/13/2021), Disp: 90 tablet, Rfl: 0     ibuprofen (ADVIL/MOTRIN) 400 MG tablet, Take 400 mg by mouth every 6 hours as needed for moderate pain, Disp: , Rfl:      levothyroxine (SYNTHROID/LEVOTHROID) 50 MCG tablet, Take 1 tablet (50 mcg) by mouth daily, Disp: 90 tablet, Rfl: 0     Lidocaine (LIDOCARE) 4 % Patch, Place 1 patch onto the skin every 24 hours To prevent lidocaine toxicity, patient should be patch free for 12 hrs daily., Disp: , Rfl:      multivitamin (CENTRUM SILVER) tablet, Take 1 tablet by mouth daily, Disp: 90 tablet, Rfl: 0     ORDER FOR DME, Equipment being ordered: Cane, Disp: 1 Units, Rfl: 0     oxyCODONE (ROXICODONE) 5 MG tablet, Take 2.5 mg by mouth every 4 hours as needed for severe pain, Disp: , Rfl:      potassium chloride ER (K-TAB/KLOR-CON) 10 MEQ CR tablet, Take 1 tablet (10 mEq) by mouth 2 times daily, Disp: 180 tablet, Rfl: 0     simvastatin (ZOCOR) 20 MG tablet, Take 1 tablet (20 mg) by mouth daily, Disp: 90 tablet, Rfl: 0  Allergies   Allergen Reactions     Atorvastatin      Fosamax      Niaspan [Niacin]      Omega-3-Acid Ethyl Esters      Zyrtec-D        All Meds and Allergies reviewed in the record at the facility and is the most up-to-date    REVIEW OF SYSTEMS:   Review of Systems  Patient is a poor historian.  No fevers or chills. No headache, lightheadedness or dizziness. No SOB, chest pains or palpitations. Appetite is good. No nausea, vomiting, constipation or diarrhea. No dysuria,  "frequency, burning or pain with urination.  Patient denies any pain in her ribs.  Otherwise review of systems are negative.     PHYSICAL EXAMINATION:  Physical Exam     Vital signs: BP (!) 162/76   Pulse 84   Temp 97.5  F (36.4  C)   Resp 22   Ht 1.499 m (4' 11\")   Wt 54.9 kg (121 lb)   SpO2 95%   BMI 24.44 kg/m    General: Awake, Alert, oriented x3, appropriately, follows simple commands, conversant  HEENT:Pink conjunctiva, anicteric sclerae, moist oral mucosa  NECK: Supple, without any lymphadenopathy, or masses  CVS:  S1  S2, without murmur or gallop.  No pain with tenderness in the rib cage.  LUNG: Clear to auscultation, No wheezes, rales or rhonci.  BACK: No kyphosis of the thoracic spine  ABDOMEN: Soft, nontender to palpation, with positive bowel sounds  EXTREMITIES: Moves both upper and lower extremities with generalized weakness, no pedal edema, no calf tenderness.  Thin and frail.  SKIN: Warm and dry, no rashes or erythema noted  NEUROLOGIC: Intact, pulses palpable  PSYCHIATRIC: Cognitive impairment with poor recall.      Labs:  All labs reviewed in the nursing home record and Epic   @  Lab Results   Component Value Date    WBC 4.9 06/12/2012     Lab Results   Component Value Date    RBC 4.12 06/12/2012     Lab Results   Component Value Date    HGB 13.1 06/12/2012     Lab Results   Component Value Date    HCT 38.5 06/12/2012     Lab Results   Component Value Date    MCV 93 06/12/2012     Lab Results   Component Value Date    MCH 31.8 06/12/2012     Lab Results   Component Value Date    MCHC 34.0 06/12/2012     Lab Results   Component Value Date    RDW 13.7 06/12/2012     Lab Results   Component Value Date     06/12/2012        @Last Comprehensive Metabolic Panel:  Sodium   Date Value Ref Range Status   10/09/2021 138 136 - 145 mmol/L Final   12/14/2020 132.2 (L) 132.6 - 141.4 mmol/L Final     Potassium   Date Value Ref Range Status   10/09/2021 4.0 3.5 - 5.0 mmol/L Final   12/14/2020 3.2 (L) " 3.3 - 4.5 mmol/L Final     Chloride   Date Value Ref Range Status   10/09/2021 104 98 - 107 mmol/L Final   12/14/2020 97.4 (L) 98.0 - 110.0 mmol/L Final     Carbon Dioxide   Date Value Ref Range Status   12/14/2020 29.8 20.0 - 32.0 mmol/L Final     Carbon Dioxide (CO2)   Date Value Ref Range Status   10/09/2021 24 22 - 31 mmol/L Final     Anion Gap   Date Value Ref Range Status   10/09/2021 10 5 - 18 mmol/L Final   12/15/2014 4 3 - 14 mmol/L Final     Glucose   Date Value Ref Range Status   10/09/2021 94 70 - 125 mg/dL Final   12/14/2020 92.4 70.0 - 99.0 mg'dL Final     Urea Nitrogen   Date Value Ref Range Status   10/09/2021 33 (H) 8 - 28 mg/dL Final   12/14/2020 16.4 7.0 - 19.0 mg/dL Final     Creatinine   Date Value Ref Range Status   10/09/2021 0.81 0.60 - 1.10 mg/dL Final   12/14/2020 0.9 0.5 - 1.0 mg/dL Final     GFR Estimate   Date Value Ref Range Status   10/09/2021 65 >60 mL/min/1.73m2 Final     Comment:     As of July 11, 2021, eGFR is calculated by the CKD-EPI creatinine equation, without race adjustment. eGFR can be influenced by muscle mass, exercise, and diet. The reported eGFR is an estimation only and is only applicable if the renal function is stable.   12/14/2020 67.0 >60.0 mL/min/1.7 m2 Final     Calcium   Date Value Ref Range Status   10/09/2021 9.3 8.5 - 10.5 mg/dL Final   12/14/2020 9.7 8.5 - 10.1 mg/dL Final         Assessment/Plan:    ICD-10-CM    1. Multiple fractures of ribs of right side (6 through 9) with routine healing  S22.41XD  patient denies any pain in her ribs on today's visit.  She continues with Tylenol for pain.   2. Fall at home, subsequent encounter  W19.XXXD  patient continues in therapy.    Y92.009    3. Weakness of both lower extremities  R29.898    4. Essential hypertension, benign goal <150  I10  patient continues on amlodipine.  Continue to monitor BP.  Patient no longer on hydrochlorothiazide.   5. Mild cognitive impairment  G31.84  continues in speech therapy.  Poor  recall noted on visit.   6. Moderate persistent asthma, unspecified whether complicated  J45.40  well-controlled with Advair.  Patient denies any shortness of breath.         This note has been dictated using voice recognition software. Any grammatical or context distortions are unintentional and inherent to the software    Electronically signed by: Maritza Gu CNP

## 2021-10-29 NOTE — TELEPHONE ENCOUNTER
[facility] Nurse Triage Telephone Note    Provider: RUTH Hale  Facility: [facility]  Facility Type:  TCU    Caller: Johanna    Allergies:    Allergies   Allergen Reactions     Atorvastatin      Fosamax      Niaspan [Niacin]      Omega-3-Acid Ethyl Esters      Zyrtec-D         Reason for call: Pt found 4am sitting on floor by her bed. Pt had no explanation as to what happened. VSS, denied pain, no bumps or bruises.    Verbal Order/Direction given by Provider: Monitor per protocol.    Provider giving Order:  RUTH Cruz    Verbal Order given to: Johanna Bernabe RN

## 2021-10-29 NOTE — PROGRESS NOTES
[facility]    Name:   Brooke Xie  :   1933  MRN:    [id number]     Facility:   [facility] ([facility]) [[id number]]   Room:   Code Status: DNR and POLST AVAILABLE -     DOS:  10/25/2021  Previous visit: 10/21/2021    PCP:  Martin Walton    CHIEF COMPLAINT / REASON FOR VISIT:  Chief Complaint   Patient presents with     Clinic Care Coordination - Follow-up     Fall and multiple rib fractures      [facility] from 10/05/2021 until 10/06/2021 (lower extremity weakness, multiple fractures of right ribs)      HPI: Brooke is a 88 year old female with history of essential hypertension, dyslipidemia, hypothyroidism, and asthma who was admitted on 10/05/2021 following presentation to [facility] ED for evaluation of fall at home.     She awoke on the morning of admission to use the bathroom when her walker tipped ove, r causing her to fall backwards, hitting her right mid-back on some furniture on the way down. Endorsed pain at the site, denied pain elsewhere. No head injury or LOC.  He did report that she had had several falls in week prior.      In the ED, the sheath was hypertensive to 200/116, otherwise vitally stable. Labs notable for K 2.6, PCO2 52. Negative troponin and creatinine WNL. EKG sinus rhythm with premature atrial complexes; no acute changes. XR of right ribs demonstrated multiple fractures. CT chest with displaced fractures involving the right 7th, 8th, and 9th posterior ribs, undisplaced fracture of right 6th posterior rib. Cervical spine and Head CT with no acute changes. Patient given KCl and Tylenol and admitted for further management.     On admission, purewick placed due to incontinence (only utilized in the hospital).  Will surgery consulted and will did not see indication for further intervention. Given lidocaine patch for upper right back. 1 dose labetalol given for elevated blood pressure with improvement to 178/84.  Hydrochlorothiazide stopped  "and amlodipine added at the time of discharge which should help hypokalemia.     The patient symptomatically improved and was subsequently discharged to to the TCU in stable condition on 10/06/2021. Medication changes listed below. Patient will follow up with her PCP in 3-5 days.       CURRENT/RECENT TCU ISSUES    Disposition:  My initial time spent with her seemed to indicate that she was alert and oriented; however I did receive a request for SLP to evaluate for cognition, and cognitive deficits are apparent.  At my first visit, she asked, \"And who are you?\"  She also did not adjust the bed before sitting on the edge to have breakfast, and she was tilting precariously while eating breakfast. She has a bit of difficulty with the correct year and, in fact, she believed she was only 80 at first. She did tell me, after discovering she was 88, that she would be happy to live to 89. Interestingly, she remembered my name over the weekend.    Lives in an apartment with mostly elderly people. There is an elevator.  Today, she she indicates she would like to go home to her .  Her  is 72 years old and blind.  She told me, \"nobody told me anything, but I'm leaving around noon tomorrow.\"    Currently, she is ambulating with a 2-wheeled walker, transfer belt, and CGA.    ROS:   She denies any pain. No headaches, nausea or vomiting, dizziness or dyspnea, dysuria, constipation or diarrhea, difficulty chewing or swallowing, integumentary issues, or problems with appetite or sleep.    Past Medical History:   Diagnosis Date     Asthma      CRI (chronic renal insufficiency), stage 3 (moderate) (H) 2/12/2020     HLD (hyperlipidemia)      HTN (hypertension)      Hypokalemia      Hypothyroidism      Multiple fractures of ribs of right side (6 through 9) with routine healing 10/14/2021     UTI (urinary tract infection)      Weakness of both lower extremities               Family History   Problem Relation Age of Onset     " Cancer - colorectal Father          age 94     C.A.D. Father          age 94     Melanoma Mother      Breast Cancer Sister          age 54     Cancer Father         colorectal      Coronary Artery Disease Father      Breast Cancer Sister      Social History     Socioeconomic History     Marital status:      Spouse name: None     Number of children: None     Years of education: None     Highest education level: None   Occupational History     None   Tobacco Use     Smoking status: Never Smoker     Smokeless tobacco: Never Used   Substance and Sexual Activity     Alcohol use: No     Drug use: No     Sexual activity: None   Other Topics Concern     Parent/sibling w/ CABG, MI or angioplasty before 65F 55M? Not Asked   Social History Narrative     None     Social Determinants of Health     Financial Resource Strain:      Difficulty of Paying Living Expenses:    Food Insecurity:      Worried About Running Out of Food in the Last Year:      Ran Out of Food in the Last Year:    Transportation Needs:      Lack of Transportation (Medical):      Lack of Transportation (Non-Medical):    Physical Activity:      Days of Exercise per Week:      Minutes of Exercise per Session:    Stress:      Feeling of Stress :    Social Connections:      Frequency of Communication with Friends and Family:      Frequency of Social Gatherings with Friends and Family:      Attends [employer] Services:      Active Member of Clubs or Organizations:      Attends Club or Organization Meetings:      Marital Status:    Intimate Partner Violence:      Fear of Current or Ex-Partner:      Emotionally Abused:      Physically Abused:      Sexually Abused:        MEDICATIONS: Reviewed from the MAR, physician orders, and/or earlier progress notes.  Post Discharge Medication Reconciliation Status: medication reconcilation previously completed during another office visit.    Current Outpatient Medications   Medication Sig     acetaminophen  (TYLENOL) 500 MG tablet Take 1,000 mg by mouth every 6 hours as needed     albuterol (VENTOLIN HFA) 108 (90 Base) MCG/ACT inhaler Inhale 2 puffs into the lungs 4 times daily as needed     amLODIPine (NORVASC) 5 MG tablet Take 5 mg by mouth daily     calcium citrate 250 MG TABS Take 1 tablet by mouth daily     Calcium-Vitamin D (CALTRATE 600 PLUS-VIT D OR) Take 1 tablet by mouth daily. (Patient not taking: Reported on 10/13/2021)     cholecalciferol 25 MCG (1000 UT) TABS Take 1 tablet by mouth daily (Patient not taking: Reported on 10/13/2021)     co-enzyme Q-10 30 MG CAPS capsule Take 1 capsule (30 mg) by mouth daily (Patient not taking: Reported on 10/13/2021)     coenzyme Q-10 200 MG CAPS Take 1 tablet by mouth daily     fluticasone-salmeterol (ADVAIR DISKUS) 100-50 MCG/DOSE inhaler Inhale 1 puff into the lungs every 12 hours     hydrochlorothiazide (HYDRODIURIL) 12.5 MG tablet Take 2 tablets (25 mg) by mouth daily (Patient not taking: Reported on 10/13/2021)     ibuprofen (ADVIL/MOTRIN) 400 MG tablet Take 400 mg by mouth every 6 hours as needed for moderate pain     levothyroxine (SYNTHROID/LEVOTHROID) 50 MCG tablet Take 1 tablet (50 mcg) by mouth daily     Lidocaine (LIDOCARE) 4 % Patch Place 1 patch onto the skin every 24 hours To prevent lidocaine toxicity, patient should be patch free for 12 hrs daily.     multivitamin (CENTRUM SILVER) tablet Take 1 tablet by mouth daily     ORDER FOR DME Equipment being ordered: Cane     oxyCODONE (ROXICODONE) 5 MG tablet Take 2.5 mg by mouth every 4 hours as needed for severe pain     potassium chloride ER (K-TAB/KLOR-CON) 10 MEQ CR tablet Take 1 tablet (10 mEq) by mouth 2 times daily     simvastatin (ZOCOR) 20 MG tablet Take 1 tablet (20 mg) by mouth daily     No current facility-administered medications for this visit.     ALLERGIES:   Allergies   Allergen Reactions     Atorvastatin      Fosamax      Niaspan [Niacin]      Omega-3-Acid Ethyl Esters      Zyrtec-D      DIET:  "Regular, regular texture, thin liquids.  Mighty Shake nutritional supplement.    Vitals:    10/25/21 2138   BP: (!) 151/77   Pulse: 88   Resp: 17   Temp: 97.3  F (36.3  C)   SpO2: 97%   Weight: 54.9 kg (121 lb)   Height: 1.499 m (4' 11\")     Body mass index is 24.44 kg/m .    EXAMINATION:   General: Pleasant, alert, and conversant generally female, lying in bed, in no apparent distress.  Head: Normocephalic and atraumatic.   Eyes: PERRLA, sclerae clear.   ENT: Moist oral mucosa, with the lips are somewhat dry.  She has her own teeth.  No rhinorrhea or nasal discharge.  Hearing is adequate for conversation in a quiet room.  Cardiovascular: Regular rate and rhythm without appreciable murmur.   Respiratory: Lungs clear to auscultation bilaterally.   Abdomen: Nondistended.   Musculoskeletal/Extremities: Age-related degenerative joint disease.  Bilateral hallux valgus deformities, right greater than left with great toe crossover.  No peripheral edema.  Integument: No rashes, clinically significant lesions, or skin breakdown.  There are, however, fungal toenails.  Cognitive/Psychiatric: Appears alert and oriented, although there are at least minor cognitive deficits.  Affect is euthymic.    DIAGNOSTICS:   No results found for this or any previous visit (from the past 240 hour(s)).   Last Comprehensive Metabolic Panel:  Sodium   Date Value Ref Range Status   10/09/2021 138 136 - 145 mmol/L Final   12/14/2020 132.2 (L) 132.6 - 141.4 mmol/L Final     Potassium   Date Value Ref Range Status   10/09/2021 4.0 3.5 - 5.0 mmol/L Final   12/14/2020 3.2 (L) 3.3 - 4.5 mmol/L Final     Chloride   Date Value Ref Range Status   10/09/2021 104 98 - 107 mmol/L Final   12/14/2020 97.4 (L) 98.0 - 110.0 mmol/L Final     Carbon Dioxide   Date Value Ref Range Status   12/14/2020 29.8 20.0 - 32.0 mmol/L Final     Carbon Dioxide (CO2)   Date Value Ref Range Status   10/09/2021 24 22 - 31 mmol/L Final     Anion Gap   Date Value Ref Range Status "   10/09/2021 10 5 - 18 mmol/L Final   12/15/2014 4 3 - 14 mmol/L Final     Glucose   Date Value Ref Range Status   10/09/2021 94 70 - 125 mg/dL Final   12/14/2020 92.4 70.0 - 99.0 mg'dL Final     Urea Nitrogen   Date Value Ref Range Status   10/09/2021 33 (H) 8 - 28 mg/dL Final   12/14/2020 16.4 7.0 - 19.0 mg/dL Final     Creatinine   Date Value Ref Range Status   10/09/2021 0.81 0.60 - 1.10 mg/dL Final   12/14/2020 0.9 0.5 - 1.0 mg/dL Final     GFR Estimate   Date Value Ref Range Status   10/09/2021 65 >60 mL/min/1.73m2 Final     Comment:     As of July 11, 2021, eGFR is calculated by the CKD-EPI creatinine equation, without race adjustment. eGFR can be influenced by muscle mass, exercise, and diet. The reported eGFR is an estimation only and is only applicable if the renal function is stable.   12/14/2020 67.0 >60.0 mL/min/1.7 m2 Final     Calcium   Date Value Ref Range Status   10/09/2021 9.3 8.5 - 10.5 mg/dL Final   12/14/2020 9.7 8.5 - 10.1 mg/dL Final       ASSESSMENT/Plan:      ICD-10-CM    1. Fall at home, subsequent encounter  W19.XXXD     Y92.009    2. Multiple fractures of ribs of right side (6 through 9) with routine healing  S22.41XD    3. Weakness of both lower extremities  R29.898    4. Mild cognitive impairment  G31.84    5. Essential hypertension, benign goal <150  I10    6. Dyslipidemia  E78.5    7. Hereditary and idiopathic peripheral neuropathy  G60.9    8. Moderate persistent asthma, unspecified whether complicated  J45.40    9. Diaphragmatic hernia without obstruction and without gangrene  K44.9    10. Acquired hypothyroidism  E03.9        CHANGES:    None.    CARE PLAN:    The care plan, medications, vital signs, orders, and nursing notes have been reviewed, and all orders signed. Changes to care plan, if any, as noted. Otherwise, continue current plan of care.    The above has been created using voice recognition software. Please be aware that this may unintentionally  produce inaccuracies  and/or nonsensical sentences.      Electronically signed by: ALLIE Freeman CNP

## 2021-11-04 ENCOUNTER — TRANSITIONAL CARE UNIT VISIT (OUTPATIENT)
Dept: GERIATRICS | Facility: CLINIC | Age: 86
End: 2021-11-04
Payer: COMMERCIAL

## 2021-11-04 DIAGNOSIS — R29.898 WEAKNESS OF BOTH LOWER EXTREMITIES: ICD-10-CM

## 2021-11-04 DIAGNOSIS — G60.9 HEREDITARY AND IDIOPATHIC PERIPHERAL NEUROPATHY: ICD-10-CM

## 2021-11-04 DIAGNOSIS — E78.5 DYSLIPIDEMIA: ICD-10-CM

## 2021-11-04 DIAGNOSIS — K44.9 DIAPHRAGMATIC HERNIA WITHOUT OBSTRUCTION AND WITHOUT GANGRENE: ICD-10-CM

## 2021-11-04 DIAGNOSIS — J45.40 MODERATE PERSISTENT ASTHMA, UNSPECIFIED WHETHER COMPLICATED: ICD-10-CM

## 2021-11-04 DIAGNOSIS — E03.9 ACQUIRED HYPOTHYROIDISM: ICD-10-CM
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
## 2021-11-04 DIAGNOSIS — I10 ESSENTIAL HYPERTENSION, BENIGN: ICD-10-CM

## 2021-11-04 DIAGNOSIS — G31.84 MILD COGNITIVE IMPAIRMENT: ICD-10-CM

## 2021-11-04 DIAGNOSIS — S22.41XD MULTIPLE FRACTURES OF RIBS OF RIGHT SIDE WITH ROUTINE HEALING: ICD-10-CM

## 2021-11-04 PROCEDURE — 99309 SBSQ NF CARE MODERATE MDM 30: CPT | Performed by: NURSE PRACTITIONER

## 2021-11-04 ASSESSMENT — MIFFLIN-ST. JEOR: SCORE: 884.48

## 2021-11-04 NOTE — LETTER
2021        RE: Brooke Xie  [address]  [address]  [facility] [address]        [facility]    Name:   Brooke Xie  :   1933  MRN:    [id number]     Facility:   [facility] (SNF) [[id number]]   Room: U 228  Code Status: DNR and POLST AVAILABLE -     DOS:  10/25/2021  Previous visit: 10/25/2021 (also seen by Maritza Gu CNP on 10/27/2021)    PCP:  Martin Walton    CHIEF COMPLAINT / REASON FOR VISIT:  Chief Complaint   Patient presents with     Clinic Care Coordination - Follow-up     Fall and multiple rib fractures      [facility] from 10/05/2021 until 10/06/2021 (lower extremity weakness, multiple fractures of right ribs)      HPI: Brooke is a 88 year old female with history of essential hypertension, dyslipidemia, hypothyroidism, and asthma who was admitted on 10/05/2021 following presentation to [facility] ED for evaluation of fall at home.     She awoke on the morning of admission to use the bathroom when her walker tipped ove, r causing her to fall backwards, hitting her right mid-back on some furniture on the way down. Endorsed pain at the site, denied pain elsewhere. No head injury or LOC.  He did report that she had had several falls in week prior.      In the ED, the sheath was hypertensive to 200/116, otherwise vitally stable. Labs notable for K 2.6, PCO2 52. Negative troponin and creatinine WNL. EKG sinus rhythm with premature atrial complexes; no acute changes. XR of right ribs demonstrated multiple fractures. CT chest with displaced fractures involving the right 7th, 8th, and 9th posterior ribs, undisplaced fracture of right 6th posterior rib. Cervical spine and Head CT with no acute changes. Patient given KCl and Tylenol and admitted for further management.     On admission, purewick placed due to incontinence (only utilized in the hospital).  Will surgery consulted and will did not see indication for further intervention.  "Given lidocaine patch for upper right back. 1 dose labetalol given for elevated blood pressure with improvement to 178/84.  Hydrochlorothiazide stopped and amlodipine added at the time of discharge which should help hypokalemia.     The patient symptomatically improved and was subsequently discharged to to the TCU in stable condition on 10/06/2021. Medication changes listed below. Patient will follow up with her PCP in 3-5 days.       CURRENT/RECENT TCU ISSUES    Disposition:  My initial time spent with her seemed to indicate that she was alert and oriented; however I did receive a request for SLP to evaluate for cognition, and cognitive deficits are apparent.  At my first visit, she asked, \"And who are you?\"  She also did not adjust the bed before sitting on the edge to have breakfast, and she was tilting precariously while eating breakfast. She has a bit of difficulty with the correct year and, in fact, she believed she was only 80 at first. She did tell me, after discovering she was 88, that she would be happy to live to 89.  Today, the response to my visit is similar: \"Hi, who are you?  What you want?\"    The rib pain is gone.    Lives in an apartment with mostly elderly people, although it is not specifically for seniors.  She indicates that there is a neighbor who is helpful.  There is an elevator.  When last seen, she told me she would like to go home to her .  Her  is 72 years old and blind.  At my last visit, she told me, \"nobody told me anything, but I'm leaving around noon tomorrow.\"  Today, again, she tells me she has to get home.      Currently, she is ambulating with a 2-wheeled walker, transfer belt, and CGA.    ROS:   She denies any pain. No headaches, nausea or vomiting, dizziness or dyspnea, dysuria, constipation or diarrhea, difficulty chewing or swallowing, integumentary issues, or problems with appetite or sleep.    Past Medical History:   Diagnosis Date     Asthma      CRI " (chronic renal insufficiency), stage 3 (moderate) (H) 2020     HLD (hyperlipidemia)      HTN (hypertension)      Hypokalemia      Hypothyroidism      Multiple fractures of ribs of right side (6 through 9) with routine healing 10/14/2021     UTI (urinary tract infection)      Weakness of both lower extremities               Family History   Problem Relation Age of Onset     Cancer - colorectal Father          age 94     C.A.D. Father          age 94     Melanoma Mother      Breast Cancer Sister          age 54     Cancer Father         colorectal      Coronary Artery Disease Father      Breast Cancer Sister      Social History     Socioeconomic History     Marital status:      Spouse name: None     Number of children: None     Years of education: None     Highest education level: None   Occupational History     None   Tobacco Use     Smoking status: Never Smoker     Smokeless tobacco: Never Used   Substance and Sexual Activity     Alcohol use: No     Drug use: No     Sexual activity: None   Other Topics Concern     Parent/sibling w/ CABG, MI or angioplasty before 65F 55M? Not Asked   Social History Narrative     None     Social Determinants of Health     Financial Resource Strain:      Difficulty of Paying Living Expenses:    Food Insecurity:      Worried About Running Out of Food in the Last Year:      Ran Out of Food in the Last Year:    Transportation Needs:      Lack of Transportation (Medical):      Lack of Transportation (Non-Medical):    Physical Activity:      Days of Exercise per Week:      Minutes of Exercise per Session:    Stress:      Feeling of Stress :    Social Connections:      Frequency of Communication with Friends and Family:      Frequency of Social Gatherings with Friends and Family:      Attends Restoration[employer] Services:      Active Member of Clubs or Organizations:      Attends Club or Organization Meetings:      Marital Status:    Intimate Partner Violence:      Fear of  Current or Ex-Partner:      Emotionally Abused:      Physically Abused:      Sexually Abused:        MEDICATIONS: Reviewed from the MAR, physician orders, and/or earlier progress notes.  Post Discharge Medication Reconciliation Status: medication reconcilation previously completed during another office visit.    Current Outpatient Medications   Medication Sig     acetaminophen (TYLENOL) 500 MG tablet Take 1,000 mg by mouth every 6 hours as needed     albuterol (VENTOLIN HFA) 108 (90 Base) MCG/ACT inhaler Inhale 2 puffs into the lungs 4 times daily as needed     amLODIPine (NORVASC) 5 MG tablet Take 5 mg by mouth daily     calcium citrate 250 MG TABS Take 1 tablet by mouth daily     Calcium-Vitamin D (CALTRATE 600 PLUS-VIT D OR) Take 1 tablet by mouth daily. (Patient not taking: Reported on 10/13/2021)     cholecalciferol 25 MCG (1000 UT) TABS Take 1 tablet by mouth daily (Patient not taking: Reported on 10/13/2021)     co-enzyme Q-10 30 MG CAPS capsule Take 1 capsule (30 mg) by mouth daily (Patient not taking: Reported on 10/13/2021)     coenzyme Q-10 200 MG CAPS Take 1 tablet by mouth daily     fluticasone-salmeterol (ADVAIR DISKUS) 100-50 MCG/DOSE inhaler Inhale 1 puff into the lungs every 12 hours     hydrochlorothiazide (HYDRODIURIL) 12.5 MG tablet Take 2 tablets (25 mg) by mouth daily (Patient not taking: Reported on 10/13/2021)     ibuprofen (ADVIL/MOTRIN) 400 MG tablet Take 400 mg by mouth every 6 hours as needed for moderate pain     levothyroxine (SYNTHROID/LEVOTHROID) 50 MCG tablet Take 1 tablet (50 mcg) by mouth daily     Lidocaine (LIDOCARE) 4 % Patch Place 1 patch onto the skin every 24 hours To prevent lidocaine toxicity, patient should be patch free for 12 hrs daily.     multivitamin (CENTRUM SILVER) tablet Take 1 tablet by mouth daily     ORDER FOR DME Equipment being ordered: Cane     oxyCODONE (ROXICODONE) 5 MG tablet Take 2.5 mg by mouth every 4 hours as needed for severe pain     potassium  "chloride ER (K-TAB/KLOR-CON) 10 MEQ CR tablet Take 1 tablet (10 mEq) by mouth 2 times daily     simvastatin (ZOCOR) 20 MG tablet Take 1 tablet (20 mg) by mouth daily     No current facility-administered medications for this visit.     ALLERGIES:   Allergies   Allergen Reactions     Atorvastatin      Fosamax      Niaspan [Niacin]      Omega-3-Acid Ethyl Esters      Zyrtec-D      DIET: Regular, regular texture, thin liquids.  Mighty Shake nutritional supplement.    Vitals:    11/04/21 1311   BP: 115/58   Pulse: 72   Resp: 18   Temp: 97.1  F (36.2  C)   SpO2: 96%   Weight: 54.9 kg (121 lb)   Height: 1.499 m (4' 11\")     Body mass index is 24.44 kg/m .    EXAMINATION:   General: Pleasant, alert, and conversant elderly female, resting in bed, in no apparent distress.  Head: Normocephalic and atraumatic.   Eyes: PERRLA, sclerae clear.   ENT: Moist oral mucosa, with the lips are somewhat dry.  She has her own teeth.  No rhinorrhea or nasal discharge.  Hearing is adequate for conversation in a quiet room.  Cardiovascular: Regular rate and rhythm without appreciable murmur.   Respiratory: Lungs clear to auscultation bilaterally.   Abdomen: Nondistended.   Musculoskeletal/Extremities: Age-related degenerative joint disease.  Bilateral hallux valgus deformities, right greater than left with great toe crossover.  No peripheral edema.  Integument: No rashes, clinically significant lesions, or skin breakdown.  There are, however, fungal toenails.  Cognitive/Psychiatric: Appears alert and oriented, although there are at least minor cognitive deficits.  Affect is euthymic.    DIAGNOSTICS:   No results found for this or any previous visit (from the past 240 hour(s)).   Last Comprehensive Metabolic Panel:  Sodium   Date Value Ref Range Status   10/09/2021 138 136 - 145 mmol/L Final   12/14/2020 132.2 (L) 132.6 - 141.4 mmol/L Final     Potassium   Date Value Ref Range Status   10/09/2021 4.0 3.5 - 5.0 mmol/L Final   12/14/2020 3.2 (L) " 3.3 - 4.5 mmol/L Final     Chloride   Date Value Ref Range Status   10/09/2021 104 98 - 107 mmol/L Final   12/14/2020 97.4 (L) 98.0 - 110.0 mmol/L Final     Carbon Dioxide   Date Value Ref Range Status   12/14/2020 29.8 20.0 - 32.0 mmol/L Final     Carbon Dioxide (CO2)   Date Value Ref Range Status   10/09/2021 24 22 - 31 mmol/L Final     Anion Gap   Date Value Ref Range Status   10/09/2021 10 5 - 18 mmol/L Final   12/15/2014 4 3 - 14 mmol/L Final     Glucose   Date Value Ref Range Status   10/09/2021 94 70 - 125 mg/dL Final   12/14/2020 92.4 70.0 - 99.0 mg'dL Final     Urea Nitrogen   Date Value Ref Range Status   10/09/2021 33 (H) 8 - 28 mg/dL Final   12/14/2020 16.4 7.0 - 19.0 mg/dL Final     Creatinine   Date Value Ref Range Status   10/09/2021 0.81 0.60 - 1.10 mg/dL Final   12/14/2020 0.9 0.5 - 1.0 mg/dL Final     GFR Estimate   Date Value Ref Range Status   10/09/2021 65 >60 mL/min/1.73m2 Final     Comment:     As of July 11, 2021, eGFR is calculated by the CKD-EPI creatinine equation, without race adjustment. eGFR can be influenced by muscle mass, exercise, and diet. The reported eGFR is an estimation only and is only applicable if the renal function is stable.   12/14/2020 67.0 >60.0 mL/min/1.7 m2 Final     Calcium   Date Value Ref Range Status   10/09/2021 9.3 8.5 - 10.5 mg/dL Final   12/14/2020 9.7 8.5 - 10.1 mg/dL Final       ASSESSMENT/Plan:      ICD-10-CM    1. Fall at home, subsequent encounter  W19.XXXD     Y92.009    2. Multiple fractures of ribs of right side (6 through 9) with routine healing  S22.41XD    3. Weakness of both lower extremities  R29.898    4. Mild cognitive impairment  G31.84    5. Essential hypertension, benign goal <150  I10    6. Dyslipidemia  E78.5    7. Hereditary and idiopathic peripheral neuropathy  G60.9    8. Moderate persistent asthma, unspecified whether complicated  J45.40    9. Diaphragmatic hernia without obstruction and without gangrene  K44.9    10. Acquired  hypothyroidism  E03.9        CHANGES:    None.    CARE PLAN:    The care plan, medications, vital signs, orders, and nursing notes have been reviewed, and all orders signed. Changes to care plan, if any, as noted. Otherwise, continue current plan of care.    The above has been created using voice recognition software. Please be aware that this may unintentionally  produce inaccuracies and/or nonsensical sentences.      Electronically signed by: ALLIE Freeman CNP        Sincerely,        ALLIE Freeman CNP

## 2021-11-07 VITALS
DIASTOLIC BLOOD PRESSURE: 58 MMHG | OXYGEN SATURATION: 96 % | BODY MASS INDEX: 24.39 KG/M2 | RESPIRATION RATE: 18 BRPM | TEMPERATURE: 97.1 F | HEIGHT: 59 IN | SYSTOLIC BLOOD PRESSURE: 115 MMHG | HEART RATE: 72 BPM | WEIGHT: 121 LBS

## 2021-11-07 NOTE — PROGRESS NOTES
[facility]    Name:   Brooke Xie  :   1933  MRN:    [id number]     Facility:   [facility] ([facility]) [[id number]]   Room:   Code Status: DNR and POLST AVAILABLE -     DOS:  2021  Previous visit: 10/25/2021 (also seen by Maritza Gu CNP on 10/27/2021)    PCP:  Martin Walton    CHIEF COMPLAINT / REASON FOR VISIT:  Chief Complaint   Patient presents with     Clinic Care Coordination - Follow-up     Fall and multiple rib fractures      [facility] from 10/05/2021 until 10/06/2021 (lower extremity weakness, multiple fractures of right ribs)      HPI: Brooke is a 88 year old female with history of essential hypertension, dyslipidemia, hypothyroidism, and asthma who was admitted on 10/05/2021 following presentation to [facility] ED for evaluation of fall at home.     She awoke on the morning of admission to use the bathroom when her walker tipped ove, r causing her to fall backwards, hitting her right mid-back on some furniture on the way down. Endorsed pain at the site, denied pain elsewhere. No head injury or LOC.  He did report that she had had several falls in week prior.      In the ED, the sheath was hypertensive to 200/116, otherwise vitally stable. Labs notable for K 2.6, PCO2 52. Negative troponin and creatinine WNL. EKG sinus rhythm with premature atrial complexes; no acute changes. XR of right ribs demonstrated multiple fractures. CT chest with displaced fractures involving the right 7th, 8th, and 9th posterior ribs, undisplaced fracture of right 6th posterior rib. Cervical spine and Head CT with no acute changes. Patient given KCl and Tylenol and admitted for further management.     On admission, purewick placed due to incontinence (only utilized in the hospital).  Will surgery consulted and will did not see indication for further intervention. Given lidocaine patch for upper right back. 1 dose labetalol given for elevated blood pressure with  "improvement to 178/84.  Hydrochlorothiazide stopped and amlodipine added at the time of discharge which should help hypokalemia.     The patient symptomatically improved and was subsequently discharged to to the TCU in stable condition on 10/06/2021. Medication changes listed below. Patient will follow up with her PCP in 3-5 days.       CURRENT/RECENT TCU ISSUES    Disposition:  My initial time spent with her seemed to indicate that she was alert and oriented; however I did receive a request for SLP to evaluate for cognition, and cognitive deficits are apparent.  At my first visit, she asked, \"And who are you?\"  She also did not adjust the bed before sitting on the edge to have breakfast, and she was tilting precariously while eating breakfast. She has a bit of difficulty with the correct year and, in fact, she believed she was only 80 at first. She did tell me, after discovering she was 88, that she would be happy to live to 89.  Today, the response to my visit is similar: \"Hi, who are you?  What you want?\"    The rib pain is gone.    Lives in an apartment with mostly elderly people, although it is not specifically for seniors.  She indicates that there is a neighbor who is helpful.  There is an elevator.  When last seen, she told me she would like to go home to her .  Her  is 72 years old and blind.  At my last visit, she told me, \"nobody told me anything, but I'm leaving around noon tomorrow.\"  Today, again, she tells me she has to get home.      Currently, she is ambulating with a 2-wheeled walker, transfer belt, and CGA.    ROS:   She denies any pain. No headaches, nausea or vomiting, dizziness or dyspnea, dysuria, constipation or diarrhea, difficulty chewing or swallowing, integumentary issues, or problems with appetite or sleep.    Past Medical History:   Diagnosis Date     Asthma      CRI (chronic renal insufficiency), stage 3 (moderate) (H) 2/12/2020     HLD (hyperlipidemia)      HTN " (hypertension)      Hypokalemia      Hypothyroidism      Multiple fractures of ribs of right side (6 through 9) with routine healing 10/14/2021     UTI (urinary tract infection)      Weakness of both lower extremities               Family History   Problem Relation Age of Onset     Cancer - colorectal Father          age 94     C.A.D. Father          age 94     Melanoma Mother      Breast Cancer Sister          age 54     Cancer Father         colorectal      Coronary Artery Disease Father      Breast Cancer Sister      Social History     Socioeconomic History     Marital status:      Spouse name: None     Number of children: None     Years of education: None     Highest education level: None   Occupational History     None   Tobacco Use     Smoking status: Never Smoker     Smokeless tobacco: Never Used   Substance and Sexual Activity     Alcohol use: No     Drug use: No     Sexual activity: None   Other Topics Concern     Parent/sibling w/ CABG, MI or angioplasty before 65F 55M? Not Asked   Social History Narrative     None     Social Determinants of Health     Financial Resource Strain:      Difficulty of Paying Living Expenses:    Food Insecurity:      Worried About Running Out of Food in the Last Year:      Ran Out of Food in the Last Year:    Transportation Needs:      Lack of Transportation (Medical):      Lack of Transportation (Non-Medical):    Physical Activity:      Days of Exercise per Week:      Minutes of Exercise per Session:    Stress:      Feeling of Stress :    Social Connections:      Frequency of Communication with Friends and Family:      Frequency of Social Gatherings with Friends and Family:      Attends Taoist Services:      Active Member of Clubs or Organizations:      Attends Club or Organization Meetings:      Marital Status:    Intimate Partner Violence:      Fear of Current or Ex-Partner:      Emotionally Abused:      Physically Abused:      Sexually Abused:         MEDICATIONS: Reviewed from the MAR, physician orders, and/or earlier progress notes.  Post Discharge Medication Reconciliation Status: medication reconcilation previously completed during another office visit.    Current Outpatient Medications   Medication Sig     acetaminophen (TYLENOL) 500 MG tablet Take 1,000 mg by mouth every 6 hours as needed     albuterol (VENTOLIN HFA) 108 (90 Base) MCG/ACT inhaler Inhale 2 puffs into the lungs 4 times daily as needed     amLODIPine (NORVASC) 5 MG tablet Take 5 mg by mouth daily     calcium citrate 250 MG TABS Take 1 tablet by mouth daily     Calcium-Vitamin D (CALTRATE 600 PLUS-VIT D OR) Take 1 tablet by mouth daily. (Patient not taking: Reported on 10/13/2021)     cholecalciferol 25 MCG (1000 UT) TABS Take 1 tablet by mouth daily (Patient not taking: Reported on 10/13/2021)     co-enzyme Q-10 30 MG CAPS capsule Take 1 capsule (30 mg) by mouth daily (Patient not taking: Reported on 10/13/2021)     coenzyme Q-10 200 MG CAPS Take 1 tablet by mouth daily     fluticasone-salmeterol (ADVAIR DISKUS) 100-50 MCG/DOSE inhaler Inhale 1 puff into the lungs every 12 hours     hydrochlorothiazide (HYDRODIURIL) 12.5 MG tablet Take 2 tablets (25 mg) by mouth daily (Patient not taking: Reported on 10/13/2021)     ibuprofen (ADVIL/MOTRIN) 400 MG tablet Take 400 mg by mouth every 6 hours as needed for moderate pain     levothyroxine (SYNTHROID/LEVOTHROID) 50 MCG tablet Take 1 tablet (50 mcg) by mouth daily     Lidocaine (LIDOCARE) 4 % Patch Place 1 patch onto the skin every 24 hours To prevent lidocaine toxicity, patient should be patch free for 12 hrs daily.     multivitamin (CENTRUM SILVER) tablet Take 1 tablet by mouth daily     ORDER FOR DME Equipment being ordered: Cane     oxyCODONE (ROXICODONE) 5 MG tablet Take 2.5 mg by mouth every 4 hours as needed for severe pain     potassium chloride ER (K-TAB/KLOR-CON) 10 MEQ CR tablet Take 1 tablet (10 mEq) by mouth 2 times daily      "simvastatin (ZOCOR) 20 MG tablet Take 1 tablet (20 mg) by mouth daily     No current facility-administered medications for this visit.     ALLERGIES:   Allergies   Allergen Reactions     Atorvastatin      Fosamax      Niaspan [Niacin]      Omega-3-Acid Ethyl Esters      Zyrtec-D      DIET: Regular, regular texture, thin liquids.  Mighty Shake nutritional supplement.    Vitals:    11/04/21 1311   BP: 115/58   Pulse: 72   Resp: 18   Temp: 97.1  F (36.2  C)   SpO2: 96%   Weight: 54.9 kg (121 lb)   Height: 1.499 m (4' 11\")     Body mass index is 24.44 kg/m .    EXAMINATION:   General: Pleasant, alert, and conversant elderly female, resting in bed, in no apparent distress.  Head: Normocephalic and atraumatic.   Eyes: PERRLA, sclerae clear.   ENT: Moist oral mucosa, with the lips are somewhat dry.  She has her own teeth.  No rhinorrhea or nasal discharge.  Hearing is adequate for conversation in a quiet room.  Cardiovascular: Regular rate and rhythm without appreciable murmur.   Respiratory: Lungs clear to auscultation bilaterally.   Abdomen: Nondistended.   Musculoskeletal/Extremities: Age-related degenerative joint disease.  Bilateral hallux valgus deformities, right greater than left with great toe crossover.  No peripheral edema.  Integument: No rashes, clinically significant lesions, or skin breakdown.  There are, however, fungal toenails.  Cognitive/Psychiatric: Appears alert and oriented, although there are at least minor cognitive deficits.  Affect is euthymic.    DIAGNOSTICS:   No results found for this or any previous visit (from the past 240 hour(s)).   Last Comprehensive Metabolic Panel:  Sodium   Date Value Ref Range Status   10/09/2021 138 136 - 145 mmol/L Final   12/14/2020 132.2 (L) 132.6 - 141.4 mmol/L Final     Potassium   Date Value Ref Range Status   10/09/2021 4.0 3.5 - 5.0 mmol/L Final   12/14/2020 3.2 (L) 3.3 - 4.5 mmol/L Final     Chloride   Date Value Ref Range Status   10/09/2021 104 98 - 107 " mmol/L Final   12/14/2020 97.4 (L) 98.0 - 110.0 mmol/L Final     Carbon Dioxide   Date Value Ref Range Status   12/14/2020 29.8 20.0 - 32.0 mmol/L Final     Carbon Dioxide (CO2)   Date Value Ref Range Status   10/09/2021 24 22 - 31 mmol/L Final     Anion Gap   Date Value Ref Range Status   10/09/2021 10 5 - 18 mmol/L Final   12/15/2014 4 3 - 14 mmol/L Final     Glucose   Date Value Ref Range Status   10/09/2021 94 70 - 125 mg/dL Final   12/14/2020 92.4 70.0 - 99.0 mg'dL Final     Urea Nitrogen   Date Value Ref Range Status   10/09/2021 33 (H) 8 - 28 mg/dL Final   12/14/2020 16.4 7.0 - 19.0 mg/dL Final     Creatinine   Date Value Ref Range Status   10/09/2021 0.81 0.60 - 1.10 mg/dL Final   12/14/2020 0.9 0.5 - 1.0 mg/dL Final     GFR Estimate   Date Value Ref Range Status   10/09/2021 65 >60 mL/min/1.73m2 Final     Comment:     As of July 11, 2021, eGFR is calculated by the CKD-EPI creatinine equation, without race adjustment. eGFR can be influenced by muscle mass, exercise, and diet. The reported eGFR is an estimation only and is only applicable if the renal function is stable.   12/14/2020 67.0 >60.0 mL/min/1.7 m2 Final     Calcium   Date Value Ref Range Status   10/09/2021 9.3 8.5 - 10.5 mg/dL Final   12/14/2020 9.7 8.5 - 10.1 mg/dL Final       ASSESSMENT/Plan:      ICD-10-CM    1. Fall at home, subsequent encounter  W19.XXXD     Y92.009    2. Multiple fractures of ribs of right side (6 through 9) with routine healing  S22.41XD    3. Weakness of both lower extremities  R29.898    4. Mild cognitive impairment  G31.84    5. Essential hypertension, benign goal <150  I10    6. Dyslipidemia  E78.5    7. Hereditary and idiopathic peripheral neuropathy  G60.9    8. Moderate persistent asthma, unspecified whether complicated  J45.40    9. Diaphragmatic hernia without obstruction and without gangrene  K44.9    10. Acquired hypothyroidism  E03.9        CHANGES:    None.    CARE PLAN:    The care plan, medications, vital  signs, orders, and nursing notes have been reviewed, and all orders signed. Changes to care plan, if any, as noted. Otherwise, continue current plan of care.    The above has been created using voice recognition software. Please be aware that this may unintentionally  produce inaccuracies and/or nonsensical sentences.      Electronically signed by: ALLIE Freeman CNP

## 2021-11-08 ENCOUNTER — TRANSITIONAL CARE UNIT VISIT (OUTPATIENT)
Dept: GERIATRICS | Facility: CLINIC | Age: 86
End: 2021-11-08
Payer: COMMERCIAL

## 2021-11-08 VITALS
WEIGHT: 119.4 LBS | RESPIRATION RATE: 14 BRPM | DIASTOLIC BLOOD PRESSURE: 59 MMHG | SYSTOLIC BLOOD PRESSURE: 106 MMHG | TEMPERATURE: 98.2 F | OXYGEN SATURATION: 96 % | HEIGHT: 59 IN | HEART RATE: 73 BPM | BODY MASS INDEX: 24.07 KG/M2

## 2021-11-08 DIAGNOSIS — J45.40 MODERATE PERSISTENT ASTHMA, UNSPECIFIED WHETHER COMPLICATED: ICD-10-CM

## 2021-11-08 DIAGNOSIS — E03.9 ACQUIRED HYPOTHYROIDISM: ICD-10-CM

## 2021-11-08 DIAGNOSIS — S22.41XD MULTIPLE FRACTURES OF RIBS OF RIGHT SIDE WITH ROUTINE HEALING: ICD-10-CM

## 2021-11-08 DIAGNOSIS — E78.5 DYSLIPIDEMIA: ICD-10-CM

## 2021-11-08 DIAGNOSIS — I10 ESSENTIAL HYPERTENSION, BENIGN: ICD-10-CM

## 2021-11-08 DIAGNOSIS — R29.898 WEAKNESS OF BOTH LOWER EXTREMITIES: ICD-10-CM

## 2021-11-08 DIAGNOSIS — K44.9 DIAPHRAGMATIC HERNIA WITHOUT OBSTRUCTION AND WITHOUT GANGRENE: ICD-10-CM

## 2021-11-08 DIAGNOSIS — G60.9 HEREDITARY AND IDIOPATHIC PERIPHERAL NEUROPATHY: ICD-10-CM
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
## 2021-11-08 DIAGNOSIS — G31.84 MILD COGNITIVE IMPAIRMENT: ICD-10-CM

## 2021-11-08 PROCEDURE — 99309 SBSQ NF CARE MODERATE MDM 30: CPT | Performed by: NURSE PRACTITIONER

## 2021-11-08 ASSESSMENT — MIFFLIN-ST. JEOR: SCORE: 877.22

## 2021-11-08 NOTE — LETTER
2021        RE: Brooke Xie  [address]  [address]  [facility] [id number]        [facility]    Name:   Brooke Xie  :   1933  MRN:    [id number]     Facility:   [facility] (SNF) [[id number]]   Room: [facility] 228  Code Status: DNR and POLST AVAILABLE -     DOS:  2021  Previous visit: 2021    PCP:  Martin Walton    CHIEF COMPLAINT / REASON FOR VISIT:  Chief Complaint   Patient presents with     Clinic Care Coordination - Follow-up     Fall and multiple rib fractures      [facility] from 10/05/2021 until 10/06/2021 (lower extremity weakness, multiple fractures of right ribs)      HPI: Brooke is a 88 year old female with history of essential hypertension, dyslipidemia, hypothyroidism, and asthma who was admitted on 10/05/2021 following presentation to [facility] ED for evaluation of fall at home.     She awoke on the morning of admission to use the bathroom when her walker tipped ove, r causing her to fall backwards, hitting her right mid-back on some furniture on the way down. Endorsed pain at the site, denied pain elsewhere. No head injury or LOC.  He did report that she had had several falls in week prior.      In the ED, the sheath was hypertensive to 200/116, otherwise vitally stable. Labs notable for K 2.6, PCO2 52. Negative troponin and creatinine WNL. EKG sinus rhythm with premature atrial complexes; no acute changes. XR of right ribs demonstrated multiple fractures. CT chest with displaced fractures involving the right 7th, 8th, and 9th posterior ribs, undisplaced fracture of right 6th posterior rib. Cervical spine and Head CT with no acute changes. Patient given KCl and Tylenol and admitted for further management.     On admission, purewick placed due to incontinence (only utilized in the hospital).  Will surgery consulted and will did not see indication for further intervention. Given lidocaine patch for upper right back. 1  "dose labetalol given for elevated blood pressure with improvement to 178/84.  Hydrochlorothiazide stopped and amlodipine added at the time of discharge which should help hypokalemia.     The patient symptomatically improved and was subsequently discharged to to the TCU in stable condition on 10/06/2021. Medication changes listed below. Patient will follow up with her PCP in 3-5 days.       CURRENT/RECENT TCU ISSUES    Disposition:  My initial time spent with her seemed to suggest that she was alert and oriented; however I did receive a request for SLP to evaluate for cognition, and cognitive deficits became apparent.  At my first visit, she asked, \"And who are you?\"  She also did not adjust the bed before sitting on the edge to have breakfast, and she was tilting precariously while eating breakfast. She has a bit of difficulty with the correct year and, in fact, she believed she was only 80 at first. She did tell me, after discovering she was 88, that she would be happy to live to 89.  Today, the patient is found lying in bed watching television and states her pain is gone, her appetite is fine, she is having regular bowel movements and would like to go home. Patient also states that she has not had \"much therapy lately\"    Lives in an apartment with mostly elderly although not specifically for seniors.  There is an elevator. Says there is a neighbor who is helpful.  When seen earlier, she told me she would like to go home to her .  Her , she says, is 72 years old and blind. Currently, ambulatory with a 2-wheeled walker, transfer belt, and CGA.    ROS:   She denies any pain. No headaches, nausea or vomiting, dizziness or dyspnea, dysuria, constipation or diarrhea, difficulty chewing or swallowing, integumentary issues, or problems with appetite or sleep.      Past Medical History:   Diagnosis Date     Asthma      CRI (chronic renal insufficiency), stage 3 (moderate) (H) 2/12/2020     HLD " (hyperlipidemia)      HTN (hypertension)      Hypokalemia      Hypothyroidism      Multiple fractures of ribs of right side (6 through 9) with routine healing 10/14/2021     UTI (urinary tract infection)      Weakness of both lower extremities               Family History   Problem Relation Age of Onset     Cancer - colorectal Father          age 94     C.A.D. Father          age 94     Melanoma Mother      Breast Cancer Sister          age 54     Cancer Father         colorectal      Coronary Artery Disease Father      Breast Cancer Sister      Social History     Socioeconomic History     Marital status:      Spouse name: None     Number of children: None     Years of education: None     Highest education level: None   Occupational History     None   Tobacco Use     Smoking status: Never Smoker     Smokeless tobacco: Never Used   Substance and Sexual Activity     Alcohol use: No     Drug use: No     Sexual activity: None   Other Topics Concern     Parent/sibling w/ CABG, MI or angioplasty before 65F 55M? Not Asked   Social History Narrative     None     Social Determinants of Health     Financial Resource Strain:      Difficulty of Paying Living Expenses:    Food Insecurity:      Worried About Running Out of Food in the Last Year:      Ran Out of Food in the Last Year:    Transportation Needs:      Lack of Transportation (Medical):      Lack of Transportation (Non-Medical):    Physical Activity:      Days of Exercise per Week:      Minutes of Exercise per Session:    Stress:      Feeling of Stress :    Social Connections:      Frequency of Communication with Friends and Family:      Frequency of Social Gatherings with Friends and Family:      Attends [employer] Services:      Active Member of Clubs or Organizations:      Attends Club or Organization Meetings:      Marital Status:    Intimate Partner Violence:      Fear of Current or Ex-Partner:      Emotionally Abused:      Physically Abused:       Sexually Abused:        MEDICATIONS: Reviewed from the MAR, physician orders, and/or earlier progress notes.  Post Discharge Medication Reconciliation Status: medication reconcilation previously completed during another office visit.    Current Outpatient Medications   Medication Sig     acetaminophen (TYLENOL) 500 MG tablet Take 1,000 mg by mouth every 6 hours as needed     albuterol (VENTOLIN HFA) 108 (90 Base) MCG/ACT inhaler Inhale 2 puffs into the lungs 4 times daily as needed     amLODIPine (NORVASC) 5 MG tablet Take 5 mg by mouth daily     calcium citrate 250 MG TABS Take 1 tablet by mouth daily     Calcium-Vitamin D (CALTRATE 600 PLUS-VIT D OR) Take 1 tablet by mouth daily. (Patient not taking: Reported on 10/13/2021)     cholecalciferol 25 MCG (1000 UT) TABS Take 1 tablet by mouth daily (Patient not taking: Reported on 10/13/2021)     co-enzyme Q-10 30 MG CAPS capsule Take 1 capsule (30 mg) by mouth daily (Patient not taking: Reported on 10/13/2021)     coenzyme Q-10 200 MG CAPS Take 1 tablet by mouth daily     fluticasone-salmeterol (ADVAIR DISKUS) 100-50 MCG/DOSE inhaler Inhale 1 puff into the lungs every 12 hours     hydrochlorothiazide (HYDRODIURIL) 12.5 MG tablet Take 2 tablets (25 mg) by mouth daily (Patient not taking: Reported on 10/13/2021)     ibuprofen (ADVIL/MOTRIN) 400 MG tablet Take 400 mg by mouth every 6 hours as needed for moderate pain     levothyroxine (SYNTHROID/LEVOTHROID) 50 MCG tablet Take 1 tablet (50 mcg) by mouth daily     Lidocaine (LIDOCARE) 4 % Patch Place 1 patch onto the skin every 24 hours To prevent lidocaine toxicity, patient should be patch free for 12 hrs daily.     multivitamin (CENTRUM SILVER) tablet Take 1 tablet by mouth daily     ORDER FOR DME Equipment being ordered: Cane     oxyCODONE (ROXICODONE) 5 MG tablet Take 2.5 mg by mouth every 4 hours as needed for severe pain     potassium chloride ER (K-TAB/KLOR-CON) 10 MEQ CR tablet Take 1 tablet (10 mEq) by mouth 2  "times daily     simvastatin (ZOCOR) 20 MG tablet Take 1 tablet (20 mg) by mouth daily     No current facility-administered medications for this visit.     ALLERGIES:   Allergies   Allergen Reactions     Atorvastatin      Fosamax      Niaspan [Niacin]      Omega-3-Acid Ethyl Esters      Zyrtec-D      DIET: Regular, regular texture, thin liquids.  Mighty Shake nutritional supplement.    Vitals:    11/08/21 1110   BP: 106/59   Pulse: 73   Resp: 14   Temp: 98.2  F (36.8  C)   SpO2: 96%   Weight: 54.2 kg (119 lb 6.4 oz)   Height: 1.499 m (4' 11\")     Body mass index is 24.12 kg/m .    EXAMINATION:   General: Pleasant, alert, and conversant elderly female, resting in bed, in no apparent distress.  Head: Normocephalic and atraumatic.   Eyes: PERRLA, sclerae clear.   ENT: Moist oral mucosa, with the lips are somewhat dry.  She has her own teeth.  No rhinorrhea or nasal discharge.  Hearing is adequate for conversation in a quiet room.  Cardiovascular: Regular rate and rhythm without appreciable murmur.   Respiratory: Lungs clear to auscultation bilaterally.   Abdomen: Nondistended.   Musculoskeletal/Extremities: Age-related degenerative joint disease.  Bilateral hallux valgus deformities, right greater than left with great toe crossover.  No peripheral edema.  Integument: No rashes, clinically significant lesions, or skin breakdown.  There are, however, fungal toenails.  Cognitive/Psychiatric: Appears alert and oriented, although there are at least minor cognitive deficits.  Affect is euthymic.    DIAGNOSTICS:   No results found for this or any previous visit (from the past 240 hour(s)).   Last Comprehensive Metabolic Panel:  Sodium   Date Value Ref Range Status   10/09/2021 138 136 - 145 mmol/L Final   12/14/2020 132.2 (L) 132.6 - 141.4 mmol/L Final     Potassium   Date Value Ref Range Status   10/09/2021 4.0 3.5 - 5.0 mmol/L Final   12/14/2020 3.2 (L) 3.3 - 4.5 mmol/L Final     Chloride   Date Value Ref Range Status "   10/09/2021 104 98 - 107 mmol/L Final   12/14/2020 97.4 (L) 98.0 - 110.0 mmol/L Final     Carbon Dioxide   Date Value Ref Range Status   12/14/2020 29.8 20.0 - 32.0 mmol/L Final     Carbon Dioxide (CO2)   Date Value Ref Range Status   10/09/2021 24 22 - 31 mmol/L Final     Anion Gap   Date Value Ref Range Status   10/09/2021 10 5 - 18 mmol/L Final   12/15/2014 4 3 - 14 mmol/L Final     Glucose   Date Value Ref Range Status   10/09/2021 94 70 - 125 mg/dL Final   12/14/2020 92.4 70.0 - 99.0 mg'dL Final     Urea Nitrogen   Date Value Ref Range Status   10/09/2021 33 (H) 8 - 28 mg/dL Final   12/14/2020 16.4 7.0 - 19.0 mg/dL Final     Creatinine   Date Value Ref Range Status   10/09/2021 0.81 0.60 - 1.10 mg/dL Final   12/14/2020 0.9 0.5 - 1.0 mg/dL Final     GFR Estimate   Date Value Ref Range Status   10/09/2021 65 >60 mL/min/1.73m2 Final     Comment:     As of July 11, 2021, eGFR is calculated by the CKD-EPI creatinine equation, without race adjustment. eGFR can be influenced by muscle mass, exercise, and diet. The reported eGFR is an estimation only and is only applicable if the renal function is stable.   12/14/2020 67.0 >60.0 mL/min/1.7 m2 Final     Calcium   Date Value Ref Range Status   10/09/2021 9.3 8.5 - 10.5 mg/dL Final   12/14/2020 9.7 8.5 - 10.1 mg/dL Final       ASSESSMENT/Plan:      ICD-10-CM    1. Fall at home, subsequent encounter  W19.XXXD     Y92.009    2. Multiple fractures of ribs of right side (6 through 9) with routine healing  S22.41XD    3. Weakness of both lower extremities  R29.898    4. Mild cognitive impairment  G31.84    5. Essential hypertension, benign goal <150  I10    6. Dyslipidemia  E78.5    7. Hereditary and idiopathic peripheral neuropathy  G60.9    8. Moderate persistent asthma, unspecified whether complicated  J45.40    9. Diaphragmatic hernia without obstruction and without gangrene  K44.9    10. Acquired hypothyroidism  E03.9        CHANGES:    None.    CARE PLAN:    The care  plan, medications, vital signs, orders, and nursing notes have been reviewed, and all orders signed. Changes to care plan, if any, as noted. Otherwise, continue current plan of care.    The above has been created using voice recognition software. Please be aware that this may unintentionally  produce inaccuracies and/or nonsensical sentences.      Electronically signed by: ALLIE Freeman CNP        Sincerely,        ALLIE Freeman CNP

## 2021-11-09 NOTE — PROGRESS NOTES
[facility]    Name:   Brooke Xie  :   1933  MRN:    [id number]     Facility:   [facility] ([facility]) [[id number]]   Room:   Code Status: DNR and POLST AVAILABLE -     DOS:  2021  Previous visit: 2021    PCP:  Martin Walton    CHIEF COMPLAINT / REASON FOR VISIT:  Chief Complaint   Patient presents with     Clinic Care Coordination - Follow-up     Fall and multiple rib fractures      [facility] from 10/05/2021 until 10/06/2021 (lower extremity weakness, multiple fractures of right ribs)      HPI: Brooke is a 88 year old female with history of essential hypertension, dyslipidemia, hypothyroidism, and asthma who was admitted on 10/05/2021 following presentation to [facility] ED for evaluation of fall at home.     She awoke on the morning of admission to use the bathroom when her walker tipped ove, r causing her to fall backwards, hitting her right mid-back on some furniture on the way down. Endorsed pain at the site, denied pain elsewhere. No head injury or LOC.  He did report that she had had several falls in week prior.      In the ED, the sheath was hypertensive to 200/116, otherwise vitally stable. Labs notable for K 2.6, PCO2 52. Negative troponin and creatinine WNL. EKG sinus rhythm with premature atrial complexes; no acute changes. XR of right ribs demonstrated multiple fractures. CT chest with displaced fractures involving the right 7th, 8th, and 9th posterior ribs, undisplaced fracture of right 6th posterior rib. Cervical spine and Head CT with no acute changes. Patient given KCl and Tylenol and admitted for further management.     On admission, purewick placed due to incontinence (only utilized in the hospital).  Will surgery consulted and will did not see indication for further intervention. Given lidocaine patch for upper right back. 1 dose labetalol given for elevated blood pressure with improvement to 178/84.  Hydrochlorothiazide stopped  "and amlodipine added at the time of discharge which should help hypokalemia.     The patient symptomatically improved and was subsequently discharged to to the TCU in stable condition on 10/06/2021. Medication changes listed below. Patient will follow up with her PCP in 3-5 days.       CURRENT/RECENT TCU ISSUES    Disposition:  My initial time spent with her seemed to suggest that she was alert and oriented; however I did receive a request for SLP to evaluate for cognition, and cognitive deficits became apparent.  At my first visit, she asked, \"And who are you?\"  She also did not adjust the bed before sitting on the edge to have breakfast, and she was tilting precariously while eating breakfast. She has a bit of difficulty with the correct year and, in fact, she believed she was only 80 at first. She did tell me, after discovering she was 88, that she would be happy to live to 89.  Today, the patient is found lying in bed watching television and states her pain is gone, her appetite is fine, she is having regular bowel movements and would like to go home. Patient also states that she has not had \"much therapy lately\"    Lives in an apartment with mostly elderly although not specifically for seniors.  There is an elevator. Says there is a neighbor who is helpful.  When seen earlier, she told me she would like to go home to her .  Her , she says, is 72 years old and blind. Currently, ambulatory with a 2-wheeled walker, transfer belt, and CGA.    ROS:   She denies any pain. No headaches, nausea or vomiting, dizziness or dyspnea, dysuria, constipation or diarrhea, difficulty chewing or swallowing, integumentary issues, or problems with appetite or sleep.      Past Medical History:   Diagnosis Date     Asthma      CRI (chronic renal insufficiency), stage 3 (moderate) (H) 2/12/2020     HLD (hyperlipidemia)      HTN (hypertension)      Hypokalemia      Hypothyroidism      Multiple fractures of ribs of right " side (6 through 9) with routine healing 10/14/2021     UTI (urinary tract infection)      Weakness of both lower extremities               Family History   Problem Relation Age of Onset     Cancer - colorectal Father          age 94     C.A.D. Father          age 94     Melanoma Mother      Breast Cancer Sister          age 54     Cancer Father         colorectal      Coronary Artery Disease Father      Breast Cancer Sister      Social History     Socioeconomic History     Marital status:      Spouse name: None     Number of children: None     Years of education: None     Highest education level: None   Occupational History     None   Tobacco Use     Smoking status: Never Smoker     Smokeless tobacco: Never Used   Substance and Sexual Activity     Alcohol use: No     Drug use: No     Sexual activity: None   Other Topics Concern     Parent/sibling w/ CABG, MI or angioplasty before 65F 55M? Not Asked   Social History Narrative     None     Social Determinants of Health     Financial Resource Strain:      Difficulty of Paying Living Expenses:    Food Insecurity:      Worried About Running Out of Food in the Last Year:      Ran Out of Food in the Last Year:    Transportation Needs:      Lack of Transportation (Medical):      Lack of Transportation (Non-Medical):    Physical Activity:      Days of Exercise per Week:      Minutes of Exercise per Session:    Stress:      Feeling of Stress :    Social Connections:      Frequency of Communication with Friends and Family:      Frequency of Social Gatherings with Friends and Family:      Attends Taoist Services:      Active Member of Clubs or Organizations:      Attends Club or Organization Meetings:      Marital Status:    Intimate Partner Violence:      Fear of Current or Ex-Partner:      Emotionally Abused:      Physically Abused:      Sexually Abused:        MEDICATIONS: Reviewed from the MAR, physician orders, and/or earlier progress notes.  Post  Discharge Medication Reconciliation Status: medication reconcilation previously completed during another office visit.    Current Outpatient Medications   Medication Sig     acetaminophen (TYLENOL) 500 MG tablet Take 1,000 mg by mouth every 6 hours as needed     albuterol (VENTOLIN HFA) 108 (90 Base) MCG/ACT inhaler Inhale 2 puffs into the lungs 4 times daily as needed     amLODIPine (NORVASC) 5 MG tablet Take 5 mg by mouth daily     calcium citrate 250 MG TABS Take 1 tablet by mouth daily     Calcium-Vitamin D (CALTRATE 600 PLUS-VIT D OR) Take 1 tablet by mouth daily. (Patient not taking: Reported on 10/13/2021)     cholecalciferol 25 MCG (1000 UT) TABS Take 1 tablet by mouth daily (Patient not taking: Reported on 10/13/2021)     co-enzyme Q-10 30 MG CAPS capsule Take 1 capsule (30 mg) by mouth daily (Patient not taking: Reported on 10/13/2021)     coenzyme Q-10 200 MG CAPS Take 1 tablet by mouth daily     fluticasone-salmeterol (ADVAIR DISKUS) 100-50 MCG/DOSE inhaler Inhale 1 puff into the lungs every 12 hours     hydrochlorothiazide (HYDRODIURIL) 12.5 MG tablet Take 2 tablets (25 mg) by mouth daily (Patient not taking: Reported on 10/13/2021)     ibuprofen (ADVIL/MOTRIN) 400 MG tablet Take 400 mg by mouth every 6 hours as needed for moderate pain     levothyroxine (SYNTHROID/LEVOTHROID) 50 MCG tablet Take 1 tablet (50 mcg) by mouth daily     Lidocaine (LIDOCARE) 4 % Patch Place 1 patch onto the skin every 24 hours To prevent lidocaine toxicity, patient should be patch free for 12 hrs daily.     multivitamin (CENTRUM SILVER) tablet Take 1 tablet by mouth daily     ORDER FOR DME Equipment being ordered: Cane     oxyCODONE (ROXICODONE) 5 MG tablet Take 2.5 mg by mouth every 4 hours as needed for severe pain     potassium chloride ER (K-TAB/KLOR-CON) 10 MEQ CR tablet Take 1 tablet (10 mEq) by mouth 2 times daily     simvastatin (ZOCOR) 20 MG tablet Take 1 tablet (20 mg) by mouth daily     No current  "facility-administered medications for this visit.     ALLERGIES:   Allergies   Allergen Reactions     Atorvastatin      Fosamax      Niaspan [Niacin]      Omega-3-Acid Ethyl Esters      Zyrtec-D      DIET: Regular, regular texture, thin liquids.  Mighty Shake nutritional supplement.    Vitals:    11/08/21 1110   BP: 106/59   Pulse: 73   Resp: 14   Temp: 98.2  F (36.8  C)   SpO2: 96%   Weight: 54.2 kg (119 lb 6.4 oz)   Height: 1.499 m (4' 11\")     Body mass index is 24.12 kg/m .    EXAMINATION:   General: Pleasant, alert, and conversant elderly female, resting in bed, in no apparent distress.  Head: Normocephalic and atraumatic.   Eyes: PERRLA, sclerae clear.   ENT: Moist oral mucosa, with the lips are somewhat dry.  She has her own teeth.  No rhinorrhea or nasal discharge.  Hearing is adequate for conversation in a quiet room.  Cardiovascular: Regular rate and rhythm without appreciable murmur.   Respiratory: Lungs clear to auscultation bilaterally.   Abdomen: Nondistended.   Musculoskeletal/Extremities: Age-related degenerative joint disease.  Bilateral hallux valgus deformities, right greater than left with great toe crossover.  No peripheral edema.  Integument: No rashes, clinically significant lesions, or skin breakdown.  There are, however, fungal toenails.  Cognitive/Psychiatric: Appears alert and oriented, although there are at least minor cognitive deficits.  Affect is euthymic.    DIAGNOSTICS:   No results found for this or any previous visit (from the past 240 hour(s)).   Last Comprehensive Metabolic Panel:  Sodium   Date Value Ref Range Status   10/09/2021 138 136 - 145 mmol/L Final   12/14/2020 132.2 (L) 132.6 - 141.4 mmol/L Final     Potassium   Date Value Ref Range Status   10/09/2021 4.0 3.5 - 5.0 mmol/L Final   12/14/2020 3.2 (L) 3.3 - 4.5 mmol/L Final     Chloride   Date Value Ref Range Status   10/09/2021 104 98 - 107 mmol/L Final   12/14/2020 97.4 (L) 98.0 - 110.0 mmol/L Final     Carbon Dioxide "   Date Value Ref Range Status   12/14/2020 29.8 20.0 - 32.0 mmol/L Final     Carbon Dioxide (CO2)   Date Value Ref Range Status   10/09/2021 24 22 - 31 mmol/L Final     Anion Gap   Date Value Ref Range Status   10/09/2021 10 5 - 18 mmol/L Final   12/15/2014 4 3 - 14 mmol/L Final     Glucose   Date Value Ref Range Status   10/09/2021 94 70 - 125 mg/dL Final   12/14/2020 92.4 70.0 - 99.0 mg'dL Final     Urea Nitrogen   Date Value Ref Range Status   10/09/2021 33 (H) 8 - 28 mg/dL Final   12/14/2020 16.4 7.0 - 19.0 mg/dL Final     Creatinine   Date Value Ref Range Status   10/09/2021 0.81 0.60 - 1.10 mg/dL Final   12/14/2020 0.9 0.5 - 1.0 mg/dL Final     GFR Estimate   Date Value Ref Range Status   10/09/2021 65 >60 mL/min/1.73m2 Final     Comment:     As of July 11, 2021, eGFR is calculated by the CKD-EPI creatinine equation, without race adjustment. eGFR can be influenced by muscle mass, exercise, and diet. The reported eGFR is an estimation only and is only applicable if the renal function is stable.   12/14/2020 67.0 >60.0 mL/min/1.7 m2 Final     Calcium   Date Value Ref Range Status   10/09/2021 9.3 8.5 - 10.5 mg/dL Final   12/14/2020 9.7 8.5 - 10.1 mg/dL Final       ASSESSMENT/Plan:      ICD-10-CM    1. Fall at home, subsequent encounter  W19.XXXD     Y92.009    2. Multiple fractures of ribs of right side (6 through 9) with routine healing  S22.41XD    3. Weakness of both lower extremities  R29.898    4. Mild cognitive impairment  G31.84    5. Essential hypertension, benign goal <150  I10    6. Dyslipidemia  E78.5    7. Hereditary and idiopathic peripheral neuropathy  G60.9    8. Moderate persistent asthma, unspecified whether complicated  J45.40    9. Diaphragmatic hernia without obstruction and without gangrene  K44.9    10. Acquired hypothyroidism  E03.9        CHANGES:    None.    CARE PLAN:    The care plan, medications, vital signs, orders, and nursing notes have been reviewed, and all orders signed. Changes  to care plan, if any, as noted. Otherwise, continue current plan of care.    The above has been created using voice recognition software. Please be aware that this may unintentionally  produce inaccuracies and/or nonsensical sentences.    I was present with the medical student/advanced practice registered nurse who participated in the service and in the documentation of this note.  I have verified the history and personally performed the physical exam and medical decision making.  I agree with the assessment and plan of care as documented in the note.      Electronically signed by: ALLIE Freeman CNP

## 2021-11-26 ENCOUNTER — TELEPHONE (OUTPATIENT)
Dept: GERIATRICS | Facility: CLINIC | Age: 86
End: 2021-11-26
Payer: COMMERCIAL

## 2021-11-28 NOTE — TELEPHONE ENCOUNTER
[facility] Nurse Triage Telephone Note    Provider: RUTH Hale  Facility[facility]                   Facility Type:  [facility]    Caller: Indira    Allergies:    Allergies   Allergen Reactions     Atorvastatin      Fosamax      Niaspan [Niacin]      Omega-3-Acid Ethyl Esters      Zyrtec-D         Reason for call: Yesterday pm pt C/O burning & frequency, denies any C/O today.    Verbal Order/Direction given by Provider: Monitor, enc fluids.    Provider giving Order:  RUTH Hale    Verbal Order given to: Indira Bernabe RN

## 2021-12-29 ENCOUNTER — NURSING HOME VISIT (OUTPATIENT)
Dept: GERIATRICS | Facility: CLINIC | Age: 86
End: 2021-12-29
Payer: COMMERCIAL

## 2021-12-29 VITALS
RESPIRATION RATE: 18 BRPM | BODY MASS INDEX: 24.8 KG/M2 | SYSTOLIC BLOOD PRESSURE: 125 MMHG | OXYGEN SATURATION: 93 % | HEIGHT: 59 IN | TEMPERATURE: 97.8 F | HEART RATE: 78 BPM | WEIGHT: 123 LBS | DIASTOLIC BLOOD PRESSURE: 75 MMHG

## 2021-12-29 DIAGNOSIS — G31.84 MILD COGNITIVE IMPAIRMENT: ICD-10-CM

## 2021-12-29 DIAGNOSIS — E78.5 DYSLIPIDEMIA: ICD-10-CM

## 2021-12-29 DIAGNOSIS — S22.41XD MULTIPLE FRACTURES OF RIBS OF RIGHT SIDE WITH ROUTINE HEALING: ICD-10-CM

## 2021-12-29 DIAGNOSIS — I10 ESSENTIAL HYPERTENSION, BENIGN: Primary | ICD-10-CM

## 2021-12-29 DIAGNOSIS — G60.9 HEREDITARY AND IDIOPATHIC PERIPHERAL NEUROPATHY: ICD-10-CM

## 2021-12-29 DIAGNOSIS — R29.898 WEAKNESS OF BOTH LOWER EXTREMITIES: ICD-10-CM

## 2021-12-29 DIAGNOSIS — J45.40 MODERATE PERSISTENT ASTHMA, UNSPECIFIED WHETHER COMPLICATED: ICD-10-CM

## 2021-12-29 DIAGNOSIS — E03.9 ACQUIRED HYPOTHYROIDISM: ICD-10-CM

## 2021-12-29 PROCEDURE — 99309 SBSQ NF CARE MODERATE MDM 30: CPT | Performed by: INTERNAL MEDICINE

## 2021-12-29 ASSESSMENT — MIFFLIN-ST. JEOR: SCORE: 893.55

## 2021-12-29 NOTE — LETTER
12/29/2021        RE: Brooke Xie  [address]  [address]  [place] [address]        [facility]  Chief Complaint   Patient presents with     shelter Regulatory     Hypertension, asthma, history of falls, CKD, generalized weakness.     [place] Medical Record Number:  [id number]  Place of Service where encounter took place:  [facility] () [[id number]]    HPI:    Brooke Xie  is 88 year old (1/28/1933), who is being seen today for a federally mandated E/M visit. Today's concerns are:  Review of medical problems.  Clinic visit, no acute/new concerns.  Doing well.  Denies fever or chills. No cough or cp or sob. No LH or dizziness. No NVD. No dysuria or bowel complaint. No new sensory or motor complaint. No new rash.     Brooke has history of essential hypertension, dyslipidemia, hypothyroidism, and asthma who was admitted on 10/05/2021 following presentation to [facility] ED for evaluation of fall at home.        ALLERGIES:Atorvastatin, Fosamax, Niaspan [niacin], Omega-3-acid ethyl esters, and Zyrtec-d  PAST MEDICAL HISTORY:   Past Medical History:   Diagnosis Date     Asthma      CRI (chronic renal insufficiency), stage 3 (moderate) (H) 2/12/2020     HLD (hyperlipidemia)      HTN (hypertension)      Hypokalemia      Hypothyroidism      Multiple fractures of ribs of right side (6 through 9) with routine healing 10/14/2021     UTI (urinary tract infection)      Weakness of both lower extremities      PAST SURGICAL HISTORY:   has a past surgical history that includes ENT surgery; tonsillectomy; and Tonsillectomy.  FAMILY HISTORY: family history includes Breast Cancer in her sister and sister; C.A.D. in her father; Cancer in her father; Cancer - colorectal in her father; Coronary Artery Disease in her father; Melanoma in her mother.  SOCIAL HISTORY:  reports that she has never smoked. She has never used smokeless tobacco. She reports that she does not drink alcohol  and does not use drugs.    MEDICATIONS:     Review of your medicines          Accurate as of December 29, 2021 11:59 PM. If you have any questions, ask your nurse or doctor.            CONTINUE these medicines which have NOT CHANGED      Dose / Directions   acetaminophen 500 MG tablet  Commonly known as: TYLENOL      Dose: 1,000 mg  Take 1,000 mg by mouth every 6 hours as needed  Refills: 0     amLODIPine 5 MG tablet  Commonly known as: NORVASC      Dose: 5 mg  Take 5 mg by mouth daily  Refills: 0     calcium citrate 250 MG Tabs      Dose: 1 tablet  Take 1 tablet by mouth daily  Refills: 0     CALTRATE 600 PLUS-VIT D OR      Dose: 1 tablet  Take 1 tablet by mouth daily.  Refills: 0     cholecalciferol 25 MCG (1000 UT) Tabs  Used for: Hereditary and idiopathic peripheral neuropathy      Dose: 1 tablet  Take 1 tablet by mouth daily  Quantity: 90 tablet  Refills: 0     * coenzyme Q-10 200 MG Caps      Dose: 1 tablet  Take 1 tablet by mouth daily  Refills: 0     * co-enzyme Q-10 30 MG Caps capsule  Used for: Hereditary and idiopathic peripheral neuropathy      Dose: 30 mg  Take 1 capsule (30 mg) by mouth daily  Quantity: 90 capsule  Refills: 0     fluticasone-salmeterol 100-50 MCG/DOSE inhaler  Commonly known as: Advair Diskus  Used for: Uncomplicated asthma, unspecified asthma severity, unspecified whether persistent      Dose: 1 puff  Inhale 1 puff into the lungs every 12 hours  Quantity: 14 each  Refills: 0     hydrochlorothiazide 12.5 MG tablet  Commonly known as: HYDRODIURIL  Used for: Essential hypertension      Dose: 25 mg  Take 2 tablets (25 mg) by mouth daily  Quantity: 90 tablet  Refills: 0     ibuprofen 400 MG tablet  Commonly known as: ADVIL/MOTRIN      Dose: 400 mg  Take 400 mg by mouth every 6 hours as needed for moderate pain  Refills: 0     levothyroxine 50 MCG tablet  Commonly known as: SYNTHROID/LEVOTHROID      Dose: 50 mcg  Take 1 tablet (50 mcg) by mouth daily  Quantity: 90 tablet  Refills: 0    "  Lidocaine 4 % Patch  Commonly known as: LIDOCARE      Dose: 1 patch  Place 1 patch onto the skin every 24 hours To prevent lidocaine toxicity, patient should be patch free for 12 hrs daily.  Refills: 0     multivitamin tablet  Used for: Hereditary and idiopathic peripheral neuropathy      Dose: 1 tablet  Take 1 tablet by mouth daily  Quantity: 90 tablet  Refills: 0     order for DME  Used for: Balance problems      Equipment being ordered: Cane  Quantity: 1 Units  Refills: 0     oxyCODONE 5 MG tablet  Commonly known as: ROXICODONE      Dose: 2.5 mg  Take 2.5 mg by mouth every 4 hours as needed for severe pain  Refills: 0     potassium chloride ER 10 MEQ CR tablet  Commonly known as: K-TAB/KLOR-CON  Used for: Diuretic-induced hypokalemia      Dose: 10 mEq  Take 1 tablet (10 mEq) by mouth 2 times daily  Quantity: 180 tablet  Refills: 0     simvastatin 20 MG tablet  Commonly known as: ZOCOR  Used for: Mixed hyperlipidemia      Dose: 20 mg  Take 1 tablet (20 mg) by mouth daily  Quantity: 90 tablet  Refills: 0     Ventolin  (90 Base) MCG/ACT inhaler  Generic drug: albuterol      Dose: 2 puff  Inhale 2 puffs into the lungs 4 times daily as needed  Refills: 0         * This list has 2 medication(s) that are the same as other medications prescribed for you. Read the directions carefully, and ask your doctor or other care provider to review them with you.               Case Management:  I have reviewed the care plan and MDS and do agree with the plan. Patient's desire to return to the community is not present. Information reviewed:  Medications, vital signs, orders, and nursing notes.    ROS:  4 point ROS including Respiratory, CV, GI and , other than that noted in the HPI,  is negative    Vitals:  /75   Pulse 78   Temp 97.8  F (36.6  C)   Resp 18   Ht 1.499 m (4' 11\")   Wt 55.8 kg (123 lb)   SpO2 93%   BMI 24.84 kg/m    Body mass index is 24.84 kg/m .  Exam:    Limited exam, maintained social " distancing as possible given COVID-19 Pandemic.     General: Awake, interactive, NAD  HEENT: AT/NC,  anicteric, Moist MM  Respi/Chest: Normal work of breathing.   CVS: RRR.   Abd: Non distended.   Ext: Distally warm, well perfused.  Neuro: Grossly nonfocal. At baseline.  Skin: No obvious rash on exposed areas.   Psychiatry: Mood stable.     Lab/Diagnostic data:   Labs done in SNF are in [place] EPIC. Please refer to them using [employer] Everywhere.    ASSESSMENT/PLAN  1. Essential hypertension, benign goal <150    2. Acquired hypothyroidism    3. Dyslipidemia    4. Moderate persistent asthma, unspecified whether complicated    5. Hereditary and idiopathic peripheral neuropathy    6. Weakness of both lower extremities    7. Multiple fractures of ribs of right side (6 through 9) with routine healing    8. Mild cognitive impairment      Doing well.  Stable hemodynamics.  No new concerns.    Changes: None.  Continue current medication and care plan.      Discussed with nursing staff.    Electronically signed by:  Anthony Pereira MD            Sincerely,        Anthony Pereira MD

## 2022-01-01 ENCOUNTER — LAB REQUISITION (OUTPATIENT)
Dept: LAB | Facility: CLINIC | Age: 87
End: 2022-01-01
Payer: COMMERCIAL

## 2022-01-01 ENCOUNTER — MEDICAL CORRESPONDENCE (OUTPATIENT)
Dept: HEALTH INFORMATION MANAGEMENT | Facility: CLINIC | Age: 87
End: 2022-01-01

## 2022-01-01 ENCOUNTER — IMMUNIZATION (OUTPATIENT)
Dept: FAMILY MEDICINE | Facility: CLINIC | Age: 87
End: 2022-01-01
Payer: COMMERCIAL

## 2022-01-01 DIAGNOSIS — T50.2X5A DIURETIC-INDUCED HYPOKALEMIA: ICD-10-CM

## 2022-01-01 DIAGNOSIS — E87.6 DIURETIC-INDUCED HYPOKALEMIA: ICD-10-CM

## 2022-01-01 DIAGNOSIS — U07.1 COVID-19: ICD-10-CM

## 2022-01-01 DIAGNOSIS — E03.9 HYPOTHYROIDISM, UNSPECIFIED: ICD-10-CM

## 2022-01-01 DIAGNOSIS — Z23 HIGH PRIORITY FOR 2019-NCOV VACCINE: Primary | ICD-10-CM

## 2022-01-01 DIAGNOSIS — E87.6 HYPOKALEMIA: ICD-10-CM

## 2022-01-01 LAB
ANION GAP SERPL CALCULATED.3IONS-SCNC: 15 MMOL/L (ref 7–15)
BUN SERPL-MCNC: 17.7 MG/DL (ref 8–23)
CALCIUM SERPL-MCNC: 9.4 MG/DL (ref 8.8–10.2)
CHLORIDE SERPL-SCNC: 102 MMOL/L (ref 98–107)
CREAT SERPL-MCNC: 0.9 MG/DL (ref 0.51–0.95)
DEPRECATED HCO3 PLAS-SCNC: 23 MMOL/L (ref 22–29)
GFR SERPL CREATININE-BSD FRML MDRD: 61 ML/MIN/1.73M2
GLUCOSE SERPL-MCNC: 84 MG/DL (ref 70–99)
HBA1C MFR BLD: 5.8 %
POTASSIUM SERPL-SCNC: 3.8 MMOL/L (ref 3.4–5.3)
SARS-COV-2 RNA RESP QL NAA+PROBE: NEGATIVE
SODIUM SERPL-SCNC: 140 MMOL/L (ref 136–145)
T4 FREE SERPL-MCNC: 1.18 NG/DL (ref 0.9–1.7)
TSH SERPL DL<=0.005 MIU/L-ACNC: 4.75 UIU/ML (ref 0.3–4.2)

## 2022-01-01 PROCEDURE — U0005 INFEC AGEN DETEC AMPLI PROBE: HCPCS | Mod: ORL | Performed by: NURSE PRACTITIONER

## 2022-01-01 PROCEDURE — 80048 BASIC METABOLIC PNL TOTAL CA: CPT | Mod: ORL | Performed by: NURSE PRACTITIONER

## 2022-01-01 PROCEDURE — 99207 PR NO CHARGE LOS: CPT

## 2022-01-01 PROCEDURE — 84439 ASSAY OF FREE THYROXINE: CPT | Mod: ORL | Performed by: NURSE PRACTITIONER

## 2022-01-01 PROCEDURE — U0003 INFECTIOUS AGENT DETECTION BY NUCLEIC ACID (DNA OR RNA); SEVERE ACUTE RESPIRATORY SYNDROME CORONAVIRUS 2 (SARS-COV-2) (CORONAVIRUS DISEASE [COVID-19]), AMPLIFIED PROBE TECHNIQUE, MAKING USE OF HIGH THROUGHPUT TECHNOLOGIES AS DESCRIBED BY CMS-2020-01-R: HCPCS | Mod: ORL | Performed by: NURSE PRACTITIONER

## 2022-01-01 PROCEDURE — P9604 ONE-WAY ALLOW PRORATED TRIP: HCPCS | Mod: ORL | Performed by: NURSE PRACTITIONER

## 2022-01-01 PROCEDURE — 36415 COLL VENOUS BLD VENIPUNCTURE: CPT | Mod: ORL | Performed by: NURSE PRACTITIONER

## 2022-01-01 PROCEDURE — 0054A COVID-19,PF,PFIZER (12+ YRS): CPT

## 2022-01-01 PROCEDURE — 84443 ASSAY THYROID STIM HORMONE: CPT | Mod: ORL | Performed by: NURSE PRACTITIONER

## 2022-01-01 PROCEDURE — 91305 COVID-19,PF,PFIZER (12+ YRS): CPT

## 2022-01-01 PROCEDURE — 83036 HEMOGLOBIN GLYCOSYLATED A1C: CPT | Mod: ORL | Performed by: NURSE PRACTITIONER

## 2022-01-01 RX ORDER — POTASSIUM CHLORIDE 750 MG/1
10 TABLET, EXTENDED RELEASE ORAL 2 TIMES DAILY
Qty: 180 TABLET | Refills: 0 | Status: SHIPPED | OUTPATIENT
Start: 2022-01-01 | End: 2023-01-01

## 2022-01-01 RX ORDER — POTASSIUM CHLORIDE 750 MG/1
10 TABLET, EXTENDED RELEASE ORAL 2 TIMES DAILY
Qty: 180 TABLET | Refills: 0 | Status: SHIPPED | OUTPATIENT
Start: 2022-01-01 | End: 2022-01-01

## 2022-01-10 NOTE — PROGRESS NOTES
[facility] GERIATRIC SERVICES  Chief Complaint   Patient presents with     snf Regulatory     Hypertension, asthma, history of falls, CKD, generalized weakness.     [facility] Medical Record Number:  [id number]  Place of Service where encounter took place:  [facility] () [[id number]]    HPI:    Brooke Xie  is 88 year old (1/28/1933), who is being seen today for a federally mandated E/M visit. Today's concerns are:  Review of medical problems.  Clinic visit, no acute/new concerns.  Doing well.  Denies fever or chills. No cough or cp or sob. No LH or dizziness. No NVD. No dysuria or bowel complaint. No new sensory or motor complaint. No new rash.     Brooke has history of essential hypertension, dyslipidemia, hypothyroidism, and asthma who was admitted on 10/05/2021 following presentation to [facility] ED for evaluation of fall at home.        ALLERGIES:Atorvastatin, Fosamax, Niaspan [niacin], Omega-3-acid ethyl esters, and Zyrtec-d  PAST MEDICAL HISTORY:   Past Medical History:   Diagnosis Date     Asthma      CRI (chronic renal insufficiency), stage 3 (moderate) (H) 2/12/2020     HLD (hyperlipidemia)      HTN (hypertension)      Hypokalemia      Hypothyroidism      Multiple fractures of ribs of right side (6 through 9) with routine healing 10/14/2021     UTI (urinary tract infection)      Weakness of both lower extremities      PAST SURGICAL HISTORY:   has a past surgical history that includes ENT surgery; tonsillectomy; and Tonsillectomy.  FAMILY HISTORY: family history includes Breast Cancer in her sister and sister; C.A.D. in her father; Cancer in her father; Cancer - colorectal in her father; Coronary Artery Disease in her father; Melanoma in her mother.  SOCIAL HISTORY:  reports that she has never smoked. She has never used smokeless tobacco. She reports that she does not drink alcohol and does not use drugs.    MEDICATIONS:     Review of your medicines          Accurate as of December 29, 2021  11:59 PM. If you have any questions, ask your nurse or doctor.            CONTINUE these medicines which have NOT CHANGED      Dose / Directions   acetaminophen 500 MG tablet  Commonly known as: TYLENOL      Dose: 1,000 mg  Take 1,000 mg by mouth every 6 hours as needed  Refills: 0     amLODIPine 5 MG tablet  Commonly known as: NORVASC      Dose: 5 mg  Take 5 mg by mouth daily  Refills: 0     calcium citrate 250 MG Tabs      Dose: 1 tablet  Take 1 tablet by mouth daily  Refills: 0     CALTRATE 600 PLUS-VIT D OR      Dose: 1 tablet  Take 1 tablet by mouth daily.  Refills: 0     cholecalciferol 25 MCG (1000 UT) Tabs  Used for: Hereditary and idiopathic peripheral neuropathy      Dose: 1 tablet  Take 1 tablet by mouth daily  Quantity: 90 tablet  Refills: 0     * coenzyme Q-10 200 MG Caps      Dose: 1 tablet  Take 1 tablet by mouth daily  Refills: 0     * co-enzyme Q-10 30 MG Caps capsule  Used for: Hereditary and idiopathic peripheral neuropathy      Dose: 30 mg  Take 1 capsule (30 mg) by mouth daily  Quantity: 90 capsule  Refills: 0     fluticasone-salmeterol 100-50 MCG/DOSE inhaler  Commonly known as: Advair Diskus  Used for: Uncomplicated asthma, unspecified asthma severity, unspecified whether persistent      Dose: 1 puff  Inhale 1 puff into the lungs every 12 hours  Quantity: 14 each  Refills: 0     hydrochlorothiazide 12.5 MG tablet  Commonly known as: HYDRODIURIL  Used for: Essential hypertension      Dose: 25 mg  Take 2 tablets (25 mg) by mouth daily  Quantity: 90 tablet  Refills: 0     ibuprofen 400 MG tablet  Commonly known as: ADVIL/MOTRIN      Dose: 400 mg  Take 400 mg by mouth every 6 hours as needed for moderate pain  Refills: 0     levothyroxine 50 MCG tablet  Commonly known as: SYNTHROID/LEVOTHROID      Dose: 50 mcg  Take 1 tablet (50 mcg) by mouth daily  Quantity: 90 tablet  Refills: 0     Lidocaine 4 % Patch  Commonly known as: LIDOCARE      Dose: 1 patch  Place 1 patch onto the skin every 24 hours To  "prevent lidocaine toxicity, patient should be patch free for 12 hrs daily.  Refills: 0     multivitamin tablet  Used for: Hereditary and idiopathic peripheral neuropathy      Dose: 1 tablet  Take 1 tablet by mouth daily  Quantity: 90 tablet  Refills: 0     order for DME  Used for: Balance problems      Equipment being ordered: Cane  Quantity: 1 Units  Refills: 0     oxyCODONE 5 MG tablet  Commonly known as: ROXICODONE      Dose: 2.5 mg  Take 2.5 mg by mouth every 4 hours as needed for severe pain  Refills: 0     potassium chloride ER 10 MEQ CR tablet  Commonly known as: K-TAB/KLOR-CON  Used for: Diuretic-induced hypokalemia      Dose: 10 mEq  Take 1 tablet (10 mEq) by mouth 2 times daily  Quantity: 180 tablet  Refills: 0     simvastatin 20 MG tablet  Commonly known as: ZOCOR  Used for: Mixed hyperlipidemia      Dose: 20 mg  Take 1 tablet (20 mg) by mouth daily  Quantity: 90 tablet  Refills: 0     Ventolin  (90 Base) MCG/ACT inhaler  Generic drug: albuterol      Dose: 2 puff  Inhale 2 puffs into the lungs 4 times daily as needed  Refills: 0         * This list has 2 medication(s) that are the same as other medications prescribed for you. Read the directions carefully, and ask your doctor or other care provider to review them with you.               Case Management:  I have reviewed the care plan and MDS and do agree with the plan. Patient's desire to return to the community is not present. Information reviewed:  Medications, vital signs, orders, and nursing notes.    ROS:  4 point ROS including Respiratory, CV, GI and , other than that noted in the HPI,  is negative    Vitals:  /75   Pulse 78   Temp 97.8  F (36.6  C)   Resp 18   Ht 1.499 m (4' 11\")   Wt 55.8 kg (123 lb)   SpO2 93%   BMI 24.84 kg/m    Body mass index is 24.84 kg/m .  Exam:    Limited exam, maintained social distancing as possible given COVID-19 Pandemic.     General: Awake, interactive, NAD  HEENT: AT/NC,  anicteric, Moist " MM  Respi/Chest: Normal work of breathing.   CVS: RRR.   Abd: Non distended.   Ext: Distally warm, well perfused.  Neuro: Grossly nonfocal. At baseline.  Skin: No obvious rash on exposed areas.   Psychiatry: Mood stable.     Lab/Diagnostic data:   Labs done in SNF are in [place] EPIC. Please refer to them using EPIC/Care Everywhere.    ASSESSMENT/PLAN  1. Essential hypertension, benign goal <150    2. Acquired hypothyroidism    3. Dyslipidemia    4. Moderate persistent asthma, unspecified whether complicated    5. Hereditary and idiopathic peripheral neuropathy    6. Weakness of both lower extremities    7. Multiple fractures of ribs of right side (6 through 9) with routine healing    8. Mild cognitive impairment      Doing well.  Stable hemodynamics.  No new concerns.    Changes: None.  Continue current medication and care plan.      Discussed with nursing staff.    Electronically signed by:  Anthony Pereira MD

## 2022-01-13 ENCOUNTER — NURSING HOME VISIT (OUTPATIENT)
Dept: GERIATRICS | Facility: CLINIC | Age: 87
End: 2022-01-13
Payer: COMMERCIAL

## 2022-01-13 DIAGNOSIS — G60.9 HEREDITARY AND IDIOPATHIC PERIPHERAL NEUROPATHY: ICD-10-CM

## 2022-01-13 DIAGNOSIS — E03.9 ACQUIRED HYPOTHYROIDISM: ICD-10-CM

## 2022-01-13 DIAGNOSIS — R54 FRAILTY: Primary | ICD-10-CM

## 2022-01-13 DIAGNOSIS — G31.84 MILD COGNITIVE IMPAIRMENT: ICD-10-CM

## 2022-01-13 DIAGNOSIS — I10 ESSENTIAL HYPERTENSION, BENIGN: ICD-10-CM

## 2022-01-13 DIAGNOSIS — E78.5 DYSLIPIDEMIA: ICD-10-CM

## 2022-01-13 DIAGNOSIS — J45.40 MODERATE PERSISTENT ASTHMA, UNSPECIFIED WHETHER COMPLICATED: ICD-10-CM

## 2022-01-13 PROCEDURE — 99309 SBSQ NF CARE MODERATE MDM 30: CPT | Performed by: NURSE PRACTITIONER

## 2022-01-13 NOTE — LETTER
2022        RE: Brooke Xie  [address]  [address]  [place] [address]        [facility]    Name:   Brooke Xie  :   1933  MRN:    [id number]     Facility:   [facility] () [[id number]]   Room: LTC / Sister Sofiya Mckee  Code Status: DNR and POLST AVAILABLE -     DOS:  2021  Previous visit: 2021    PCP:  Martin Walton    CHIEF COMPLAINT / REASON FOR VISIT:  Chief Complaint   Patient presents with     FVP Care Coordination - DME/Supplies     WHEELCHAIR      [facility] from 10/05/2021 until 10/06/2021 (lower extremity weakness, multiple fractures of right ribs)      HPI: Brooke is a 88 year old female with history of essential hypertension, dyslipidemia, hypothyroidism, and asthma who was admitted on 10/05/2021 following presentation to [facility] ED for evaluation of fall at home.     She awoke on the morning of admission to use the bathroom when her walker tipped over causing her to fall backwards, hitting her right mid-back on some furniture on the way down. Endorsed pain at the site, denied pain elsewhere. No head injury or LOC.  He did report that she had had several falls in week prior.      In the ED, she was hypertensive to 200/116, otherwise vitally stable. Labs notable for K 2.6, PCO2 52. Negative troponin and creatinine WNL. EKG sinus rhythm with premature atrial complexes; no acute changes. XR of right ribs demonstrated multiple fractures. CT chest with displaced fractures involving the right 7th, 8th, and 9th posterior ribs, undisplaced fracture of right 6th posterior rib. Cervical spine and Head CT with no acute changes. Patient given KCl and Tylenol and admitted for further management.     On admission, purewick placed due to incontinence (only utilized in the hospital).  Surgery consulted saw no indication for further intervention. Given lidocaine patch for upper right back. 1 dose labetalol given for  "elevated blood pressure with improvement to 178/84.  Hydrochlorothiazide stopped and amlodipine added at the time of discharge which should help hypokalemia.  She symptomatically improved and was subsequently discharged to to the TCU in stable condition on 10/06/2021.       CURRENT/RECENT TCU ISSUES    Disposition:  My initial time spent with her seemed to suggest that she was alert and oriented; however I did receive a request for SLP to evaluate for cognition, and cognitive deficits became apparent.  At my first visit, she asked, \"And who are you?\"  She also did not adjust the bed before sitting on the edge to have breakfast, and she was tilting precariously while eating breakfast. She has a bit of difficulty with the correct year and, in fact, she believed she was only 80 at first. She did tell me, after discovering she was 88, that she would be happy to live to 89.  Today, the patient is found lying in bed watching television (as I found her last time).  It is ungodly hot in the room.  She asks who I am.  She no longer has the pain, and her appetite has improved.  She is having regular bowel movements.    Lives in an apartment with mostly elderly although not specifically for seniors.  There is an elevator. Says there is a neighbor who is helpful.  When seen earlier, she told me she would like to go home to her .  Her , she says, is 72 years old and blind. Currently, ambulatory with a 2-wheeled walker, transfer belt, and CGA.    Discharge planning: She is quite anxious to leave.  \"I am going to leave as soon as possible,\" she says.  She says that she will be moving to assisted living arrangements.    ROS:   She denies any pain. No headaches, nausea or vomiting, dizziness or dyspnea, dysuria, constipation or diarrhea, difficulty chewing or swallowing, integumentary issues, or problems with appetite or sleep.      Past Medical History:   Diagnosis Date     Asthma      CRI (chronic renal " insufficiency), stage 3 (moderate) (H) 2020     HLD (hyperlipidemia)      HTN (hypertension)      Hypokalemia      Hypothyroidism      Multiple fractures of ribs of right side (6 through 9) with routine healing 10/14/2021     UTI (urinary tract infection)      Weakness of both lower extremities               Family History   Problem Relation Age of Onset     Cancer - colorectal Father          age 94     C.A.D. Father          age 94     Melanoma Mother      Breast Cancer Sister          age 54     Cancer Father         colorectal      Coronary Artery Disease Father      Breast Cancer Sister      Social History     Socioeconomic History     Marital status:      Spouse name: None     Number of children: None     Years of education: None     Highest education level: None   Occupational History     None   Tobacco Use     Smoking status: Never Smoker     Smokeless tobacco: Never Used   Substance and Sexual Activity     Alcohol use: No     Drug use: No     Sexual activity: None   Other Topics Concern     Parent/sibling w/ CABG, MI or angioplasty before 65F 55M? Not Asked   Social History Narrative     None     Social Determinants of Health     Financial Resource Strain:      Difficulty of Paying Living Expenses:    Food Insecurity:      Worried About Running Out of Food in the Last Year:      Ran Out of Food in the Last Year:    Transportation Needs:      Lack of Transportation (Medical):      Lack of Transportation (Non-Medical):    Physical Activity:      Days of Exercise per Week:      Minutes of Exercise per Session:    Stress:      Feeling of Stress :    Social Connections:      Frequency of Communication with Friends and Family:      Frequency of Social Gatherings with Friends and Family:      Attends [employer] Services:      Active Member of Clubs or Organizations:      Attends Club or Organization Meetings:      Marital Status:    Intimate Partner Violence:      Fear of Current or  Ex-Partner:      Emotionally Abused:      Physically Abused:      Sexually Abused:        MEDICATIONS: Reviewed from the MAR, physician orders, and/or earlier progress notes.  Post Discharge Medication Reconciliation Status: medication reconcilation previously completed during another office visit.    Current Outpatient Medications   Medication Sig     acetaminophen (TYLENOL) 500 MG tablet Take 1,000 mg by mouth every 6 hours as needed     albuterol (VENTOLIN HFA) 108 (90 Base) MCG/ACT inhaler Inhale 2 puffs into the lungs 4 times daily as needed     amLODIPine (NORVASC) 5 MG tablet Take 5 mg by mouth daily     calcium citrate 250 MG TABS Take 1 tablet by mouth daily     Calcium-Vitamin D (CALTRATE 600 PLUS-VIT D OR) Take 1 tablet by mouth daily. (Patient not taking: Reported on 10/13/2021)     cholecalciferol 25 MCG (1000 UT) TABS Take 1 tablet by mouth daily (Patient not taking: Reported on 10/13/2021)     co-enzyme Q-10 30 MG CAPS capsule Take 1 capsule (30 mg) by mouth daily (Patient not taking: Reported on 10/13/2021)     coenzyme Q-10 200 MG CAPS Take 1 tablet by mouth daily     fluticasone-salmeterol (ADVAIR DISKUS) 100-50 MCG/DOSE inhaler Inhale 1 puff into the lungs every 12 hours     hydrochlorothiazide (HYDRODIURIL) 12.5 MG tablet Take 2 tablets (25 mg) by mouth daily (Patient not taking: Reported on 10/13/2021)     ibuprofen (ADVIL/MOTRIN) 400 MG tablet Take 400 mg by mouth every 6 hours as needed for moderate pain     levothyroxine (SYNTHROID/LEVOTHROID) 50 MCG tablet Take 1 tablet (50 mcg) by mouth daily     Lidocaine (LIDOCARE) 4 % Patch Place 1 patch onto the skin every 24 hours To prevent lidocaine toxicity, patient should be patch free for 12 hrs daily.     multivitamin (CENTRUM SILVER) tablet Take 1 tablet by mouth daily     ORDER FOR DME Equipment being ordered: Cane     oxyCODONE (ROXICODONE) 5 MG tablet Take 2.5 mg by mouth every 4 hours as needed for severe pain     potassium chloride ER  "(K-TAB/KLOR-CON) 10 MEQ CR tablet Take 1 tablet (10 mEq) by mouth 2 times daily     simvastatin (ZOCOR) 20 MG tablet Take 1 tablet (20 mg) by mouth daily     No current facility-administered medications for this visit.     ALLERGIES:   Allergies   Allergen Reactions     Atorvastatin      Fosamax      Niaspan [Niacin]      Omega-3-Acid Ethyl Esters      Zyrtec-D      DIET: Regular, regular texture, thin liquids.  Mighty Shake nutritional supplement.    Vitals:    01/17/22 1133   BP: 131/77   Pulse: 75   Resp: 20   Temp: 97.9  F (36.6  C)   SpO2: 96%   Weight: 55.6 kg (122 lb 9.6 oz)   Height: 1.499 m (4' 11\")     Body mass index is 24.76 kg/m .    EXAMINATION:   General: Pleasant, alert, and conversant elderly female, resting in bed, in no apparent distress.  It is extremely hot in the room.  The heat is turned all the way up.  Head: Normocephalic and atraumatic.   Eyes: PERRLA, sclerae clear.   ENT: Moist oral mucosa.  She has her own teeth.  No rhinorrhea or nasal discharge.  Hearing is adequate for conversation in a quiet room.  Cardiovascular: Regular rate and rhythm without appreciable murmur.   Respiratory: Lungs clear to auscultation bilaterally.   Abdomen: Nondistended.   Musculoskeletal/Extremities: Age-related degenerative joint disease.  Bilateral hallux valgus deformities, right greater than left with great toe crossover.  No peripheral edema.  Integument: No rashes, clinically significant lesions, or skin breakdown.  There are, however, fungal toenails.  Cognitive/Psychiatric: Appears alert and oriented, although there are at least minor cognitive deficits.  Affect is euthymic.    DIAGNOSTICS:   No results found for this or any previous visit (from the past 240 hour(s)).   Last Comprehensive Metabolic Panel:  Sodium   Date Value Ref Range Status   10/09/2021 138 136 - 145 mmol/L Final   12/14/2020 132.2 (L) 132.6 - 141.4 mmol/L Final     Potassium   Date Value Ref Range Status   10/09/2021 4.0 3.5 - 5.0 " mmol/L Final   12/14/2020 3.2 (L) 3.3 - 4.5 mmol/L Final     Chloride   Date Value Ref Range Status   10/09/2021 104 98 - 107 mmol/L Final   12/14/2020 97.4 (L) 98.0 - 110.0 mmol/L Final     Carbon Dioxide   Date Value Ref Range Status   12/14/2020 29.8 20.0 - 32.0 mmol/L Final     Carbon Dioxide (CO2)   Date Value Ref Range Status   10/09/2021 24 22 - 31 mmol/L Final     Anion Gap   Date Value Ref Range Status   10/09/2021 10 5 - 18 mmol/L Final   12/15/2014 4 3 - 14 mmol/L Final     Glucose   Date Value Ref Range Status   10/09/2021 94 70 - 125 mg/dL Final   12/14/2020 92.4 70.0 - 99.0 mg'dL Final     Urea Nitrogen   Date Value Ref Range Status   10/09/2021 33 (H) 8 - 28 mg/dL Final   12/14/2020 16.4 7.0 - 19.0 mg/dL Final     Creatinine   Date Value Ref Range Status   10/09/2021 0.81 0.60 - 1.10 mg/dL Final   12/14/2020 0.9 0.5 - 1.0 mg/dL Final     GFR Estimate   Date Value Ref Range Status   10/09/2021 65 >60 mL/min/1.73m2 Final     Comment:     As of July 11, 2021, eGFR is calculated by the CKD-EPI creatinine equation, without race adjustment. eGFR can be influenced by muscle mass, exercise, and diet. The reported eGFR is an estimation only and is only applicable if the renal function is stable.   12/14/2020 67.0 >60.0 mL/min/1.7 m2 Final     Calcium   Date Value Ref Range Status   10/09/2021 9.3 8.5 - 10.5 mg/dL Final   12/14/2020 9.7 8.5 - 10.1 mg/dL Final       ASSESSMENT/Plan:      ICD-10-CM    1. Frailty  R54    2. Mild cognitive impairment  G31.84    3. Hereditary and idiopathic peripheral neuropathy  G60.9    4. Essential hypertension, benign goal <150  I10    5. Moderate persistent asthma, unspecified whether complicated  J45.40    6. Acquired hypothyroidism  E03.9    7. Dyslipidemia  E78.5        CHANGES:    None.    FACE-TO-FACE:  Brooke Xie is currently under my medical care at [facility] () [[id number]]  I had a face-to-face encounter with this patient on 1/13/2022.  This patient  has a mobility limitation due to frailty/physical deconditioning (R54), idiopathic peripheral neuropathy (G60.9), and moderate persistent asthma (J45.40) that significantly impairs her ability to participate in one or more mobility related activities of daily living (MRADLs), such as toileting, feeding, dressing, grooming, or bathing.  The mobility limitation cannot be sufficiently resolved by the use of a cane or walker.  The patient's home provides adequate access between rooms, maneuvering space, and surfaces for use of a lightweight manual wheelchair.  She is 59 inches tall and weighs 122 pounds.  Use of the wheelchair will significantly improve the patient's ability to participate in MRADLs, and it will be used on a regular basis in the home.  The patient has not expressed an unwillingness to use the wheelchair that is provided in the home.  The patient is able to safely use a manual wheelchair and has a caregiver who is available, willing, and able to provide assistance with the wheelchair.  I certify that, based on my findings, a lightweight manual wheelchair is medically necessary for the patient to discharge home.  Length of need is lifetime.      The above has been created using voice recognition software. Please be aware that this may unintentionally  produce inaccuracies and/or nonsensical sentences.      Electronically signed by: ALLIE Freeman CNP        Sincerely,        ALLIE Freeman CNP

## 2022-01-17 VITALS
DIASTOLIC BLOOD PRESSURE: 77 MMHG | OXYGEN SATURATION: 96 % | HEIGHT: 59 IN | TEMPERATURE: 97.9 F | WEIGHT: 122.6 LBS | RESPIRATION RATE: 20 BRPM | SYSTOLIC BLOOD PRESSURE: 131 MMHG | HEART RATE: 75 BPM | BODY MASS INDEX: 24.72 KG/M2

## 2022-01-17 ASSESSMENT — MIFFLIN-ST. JEOR: SCORE: 891.74

## 2022-01-18 PROBLEM — R54 FRAILTY: Status: ACTIVE | Noted: 2022-01-18

## 2022-01-18 NOTE — PROGRESS NOTES
[facility]    Name:   Brooke Xie  :   1933  MRN:    [id number]     Facility:   [facility] () [[id number]]   Room: LTC / [address]  Code Status: DNR and POLST AVAILABLE -     DOS:  2021  Previous visit: 2021    PCP:  Martin Walton    CHIEF COMPLAINT / REASON FOR VISIT:  Chief Complaint   Patient presents with     FVP Care Coordination - DME/Supplies     WHEELCHAIR      [facility] from 10/05/2021 until 10/06/2021 (lower extremity weakness, multiple fractures of right ribs)      HPI: Brooke is a 88 year old female with history of essential hypertension, dyslipidemia, hypothyroidism, and asthma who was admitted on 10/05/2021 following presentation to [facility] ED for evaluation of fall at home.     She awoke on the morning of admission to use the bathroom when her walker tipped over causing her to fall backwards, hitting her right mid-back on some furniture on the way down. Endorsed pain at the site, denied pain elsewhere. No head injury or LOC.  He did report that she had had several falls in week prior.      In the ED, she was hypertensive to 200/116, otherwise vitally stable. Labs notable for K 2.6, PCO2 52. Negative troponin and creatinine WNL. EKG sinus rhythm with premature atrial complexes; no acute changes. XR of right ribs demonstrated multiple fractures. CT chest with displaced fractures involving the right 7th, 8th, and 9th posterior ribs, undisplaced fracture of right 6th posterior rib. Cervical spine and Head CT with no acute changes. Patient given KCl and Tylenol and admitted for further management.     On admission, purewick placed due to incontinence (only utilized in the hospital).  Surgery consulted saw no indication for further intervention. Given lidocaine patch for upper right back. 1 dose labetalol given for elevated blood pressure with improvement to 178/84.  Hydrochlorothiazide stopped and amlodipine added at the time of  "discharge which should help hypokalemia.  She symptomatically improved and was subsequently discharged to to the TCU in stable condition on 10/06/2021.       CURRENT/RECENT TCU ISSUES    Disposition:  My initial time spent with her seemed to suggest that she was alert and oriented; however I did receive a request for SLP to evaluate for cognition, and cognitive deficits became apparent.  At my first visit, she asked, \"And who are you?\"  She also did not adjust the bed before sitting on the edge to have breakfast, and she was tilting precariously while eating breakfast. She has a bit of difficulty with the correct year and, in fact, she believed she was only 80 at first. She did tell me, after discovering she was 88, that she would be happy to live to 89.  Today, the patient is found lying in bed watching television (as I found her last time).  It is ungodly hot in the room.  She asks who I am.  She no longer has the pain, and her appetite has improved.  She is having regular bowel movements.    Lives in an apartment with mostly elderly although not specifically for seniors.  There is an elevator. Says there is a neighbor who is helpful.  When seen earlier, she told me she would like to go home to her .  Her , she says, is 72 years old and blind. Currently, ambulatory with a 2-wheeled walker, transfer belt, and CGA.    Discharge planning: She is quite anxious to leave.  \"I am going to leave as soon as possible,\" she says.  She says that she will be moving to assisted living arrangements.    ROS:   She denies any pain. No headaches, nausea or vomiting, dizziness or dyspnea, dysuria, constipation or diarrhea, difficulty chewing or swallowing, integumentary issues, or problems with appetite or sleep.      Past Medical History:   Diagnosis Date     Asthma      CRI (chronic renal insufficiency), stage 3 (moderate) (H) 2/12/2020     HLD (hyperlipidemia)      HTN (hypertension)      Hypokalemia      " Hypothyroidism      Multiple fractures of ribs of right side (6 through 9) with routine healing 10/14/2021     UTI (urinary tract infection)      Weakness of both lower extremities               Family History   Problem Relation Age of Onset     Cancer - colorectal Father          age 94     C.A.D. Father          age 94     Melanoma Mother      Breast Cancer Sister          age 54     Cancer Father         colorectal      Coronary Artery Disease Father      Breast Cancer Sister      Social History     Socioeconomic History     Marital status:      Spouse name: None     Number of children: None     Years of education: None     Highest education level: None   Occupational History     None   Tobacco Use     Smoking status: Never Smoker     Smokeless tobacco: Never Used   Substance and Sexual Activity     Alcohol use: No     Drug use: No     Sexual activity: None   Other Topics Concern     Parent/sibling w/ CABG, MI or angioplasty before 65F 55M? Not Asked   Social History Narrative     None     Social Determinants of Health     Financial Resource Strain:      Difficulty of Paying Living Expenses:    Food Insecurity:      Worried About Running Out of Food in the Last Year:      Ran Out of Food in the Last Year:    Transportation Needs:      Lack of Transportation (Medical):      Lack of Transportation (Non-Medical):    Physical Activity:      Days of Exercise per Week:      Minutes of Exercise per Session:    Stress:      Feeling of Stress :    Social Connections:      Frequency of Communication with Friends and Family:      Frequency of Social Gatherings with Friends and Family:      Attends Church Services:      Active Member of Clubs or Organizations:      Attends Club or Organization Meetings:      Marital Status:    Intimate Partner Violence:      Fear of Current or Ex-Partner:      Emotionally Abused:      Physically Abused:      Sexually Abused:        MEDICATIONS: Reviewed from the MAR,  physician orders, and/or earlier progress notes.  Post Discharge Medication Reconciliation Status: medication reconcilation previously completed during another office visit.    Current Outpatient Medications   Medication Sig     acetaminophen (TYLENOL) 500 MG tablet Take 1,000 mg by mouth every 6 hours as needed     albuterol (VENTOLIN HFA) 108 (90 Base) MCG/ACT inhaler Inhale 2 puffs into the lungs 4 times daily as needed     amLODIPine (NORVASC) 5 MG tablet Take 5 mg by mouth daily     calcium citrate 250 MG TABS Take 1 tablet by mouth daily     Calcium-Vitamin D (CALTRATE 600 PLUS-VIT D OR) Take 1 tablet by mouth daily. (Patient not taking: Reported on 10/13/2021)     cholecalciferol 25 MCG (1000 UT) TABS Take 1 tablet by mouth daily (Patient not taking: Reported on 10/13/2021)     co-enzyme Q-10 30 MG CAPS capsule Take 1 capsule (30 mg) by mouth daily (Patient not taking: Reported on 10/13/2021)     coenzyme Q-10 200 MG CAPS Take 1 tablet by mouth daily     fluticasone-salmeterol (ADVAIR DISKUS) 100-50 MCG/DOSE inhaler Inhale 1 puff into the lungs every 12 hours     hydrochlorothiazide (HYDRODIURIL) 12.5 MG tablet Take 2 tablets (25 mg) by mouth daily (Patient not taking: Reported on 10/13/2021)     ibuprofen (ADVIL/MOTRIN) 400 MG tablet Take 400 mg by mouth every 6 hours as needed for moderate pain     levothyroxine (SYNTHROID/LEVOTHROID) 50 MCG tablet Take 1 tablet (50 mcg) by mouth daily     Lidocaine (LIDOCARE) 4 % Patch Place 1 patch onto the skin every 24 hours To prevent lidocaine toxicity, patient should be patch free for 12 hrs daily.     multivitamin (CENTRUM SILVER) tablet Take 1 tablet by mouth daily     ORDER FOR DME Equipment being ordered: Cane     oxyCODONE (ROXICODONE) 5 MG tablet Take 2.5 mg by mouth every 4 hours as needed for severe pain     potassium chloride ER (K-TAB/KLOR-CON) 10 MEQ CR tablet Take 1 tablet (10 mEq) by mouth 2 times daily     simvastatin (ZOCOR) 20 MG tablet Take 1 tablet  "(20 mg) by mouth daily     No current facility-administered medications for this visit.     ALLERGIES:   Allergies   Allergen Reactions     Atorvastatin      Fosamax      Niaspan [Niacin]      Omega-3-Acid Ethyl Esters      Zyrtec-D      DIET: Regular, regular texture, thin liquids.  Mighty Shake nutritional supplement.    Vitals:    01/17/22 1133   BP: 131/77   Pulse: 75   Resp: 20   Temp: 97.9  F (36.6  C)   SpO2: 96%   Weight: 55.6 kg (122 lb 9.6 oz)   Height: 1.499 m (4' 11\")     Body mass index is 24.76 kg/m .    EXAMINATION:   General: Pleasant, alert, and conversant elderly female, resting in bed, in no apparent distress.  It is extremely hot in the room.  The heat is turned all the way up.  Head: Normocephalic and atraumatic.   Eyes: PERRLA, sclerae clear.   ENT: Moist oral mucosa.  She has her own teeth.  No rhinorrhea or nasal discharge.  Hearing is adequate for conversation in a quiet room.  Cardiovascular: Regular rate and rhythm without appreciable murmur.   Respiratory: Lungs clear to auscultation bilaterally.   Abdomen: Nondistended.   Musculoskeletal/Extremities: Age-related degenerative joint disease.  Bilateral hallux valgus deformities, right greater than left with great toe crossover.  No peripheral edema.  Integument: No rashes, clinically significant lesions, or skin breakdown.  There are, however, fungal toenails.  Cognitive/Psychiatric: Appears alert and oriented, although there are at least minor cognitive deficits.  Affect is euthymic.    DIAGNOSTICS:   No results found for this or any previous visit (from the past 240 hour(s)).   Last Comprehensive Metabolic Panel:  Sodium   Date Value Ref Range Status   10/09/2021 138 136 - 145 mmol/L Final   12/14/2020 132.2 (L) 132.6 - 141.4 mmol/L Final     Potassium   Date Value Ref Range Status   10/09/2021 4.0 3.5 - 5.0 mmol/L Final   12/14/2020 3.2 (L) 3.3 - 4.5 mmol/L Final     Chloride   Date Value Ref Range Status   10/09/2021 104 98 - 107 " mmol/L Final   12/14/2020 97.4 (L) 98.0 - 110.0 mmol/L Final     Carbon Dioxide   Date Value Ref Range Status   12/14/2020 29.8 20.0 - 32.0 mmol/L Final     Carbon Dioxide (CO2)   Date Value Ref Range Status   10/09/2021 24 22 - 31 mmol/L Final     Anion Gap   Date Value Ref Range Status   10/09/2021 10 5 - 18 mmol/L Final   12/15/2014 4 3 - 14 mmol/L Final     Glucose   Date Value Ref Range Status   10/09/2021 94 70 - 125 mg/dL Final   12/14/2020 92.4 70.0 - 99.0 mg'dL Final     Urea Nitrogen   Date Value Ref Range Status   10/09/2021 33 (H) 8 - 28 mg/dL Final   12/14/2020 16.4 7.0 - 19.0 mg/dL Final     Creatinine   Date Value Ref Range Status   10/09/2021 0.81 0.60 - 1.10 mg/dL Final   12/14/2020 0.9 0.5 - 1.0 mg/dL Final     GFR Estimate   Date Value Ref Range Status   10/09/2021 65 >60 mL/min/1.73m2 Final     Comment:     As of July 11, 2021, eGFR is calculated by the CKD-EPI creatinine equation, without race adjustment. eGFR can be influenced by muscle mass, exercise, and diet. The reported eGFR is an estimation only and is only applicable if the renal function is stable.   12/14/2020 67.0 >60.0 mL/min/1.7 m2 Final     Calcium   Date Value Ref Range Status   10/09/2021 9.3 8.5 - 10.5 mg/dL Final   12/14/2020 9.7 8.5 - 10.1 mg/dL Final       ASSESSMENT/Plan:      ICD-10-CM    1. Frailty  R54    2. Mild cognitive impairment  G31.84    3. Hereditary and idiopathic peripheral neuropathy  G60.9    4. Essential hypertension, benign goal <150  I10    5. Moderate persistent asthma, unspecified whether complicated  J45.40    6. Acquired hypothyroidism  E03.9    7. Dyslipidemia  E78.5        CHANGES:    None.    FACE-TO-FACE:  Brooke Xie is currently under my medical care at [facility] () [[id number]]  I had a face-to-face encounter with this patient on 1/13/2022.  This patient has a mobility limitation due to frailty/physical deconditioning (R54), idiopathic peripheral neuropathy (G60.9), and moderate  persistent asthma (J45.40) that significantly impairs her ability to participate in one or more mobility related activities of daily living (MRADLs), such as toileting, feeding, dressing, grooming, or bathing.  The mobility limitation cannot be sufficiently resolved by the use of a cane or walker.  The patient's home provides adequate access between rooms, maneuvering space, and surfaces for use of a lightweight manual wheelchair.  She is 59 inches tall and weighs 122 pounds.  Use of the wheelchair will significantly improve the patient's ability to participate in MRADLs, and it will be used on a regular basis in the home.  The patient has not expressed an unwillingness to use the wheelchair that is provided in the home.  The patient is able to safely use a manual wheelchair and has a caregiver who is available, willing, and able to provide assistance with the wheelchair.  I certify that, based on my findings, a lightweight manual wheelchair is medically necessary for the patient to discharge home.  Length of need is lifetime.      The above has been created using voice recognition software. Please be aware that this may unintentionally  produce inaccuracies and/or nonsensical sentences.      Electronically signed by: ALLIE Freeman CNP

## 2022-02-22 ENCOUNTER — LAB REQUISITION (OUTPATIENT)
Dept: LAB | Facility: CLINIC | Age: 87
End: 2022-02-22
Payer: COMMERCIAL

## 2022-02-22 DIAGNOSIS — E03.9 HYPOTHYROIDISM, UNSPECIFIED: ICD-10-CM

## 2022-02-23 LAB — TSH SERPL DL<=0.005 MIU/L-ACNC: 1.56 UIU/ML (ref 0.3–5)

## 2022-02-23 PROCEDURE — 36415 COLL VENOUS BLD VENIPUNCTURE: CPT | Mod: ORL | Performed by: NURSE PRACTITIONER

## 2022-02-23 PROCEDURE — 84443 ASSAY THYROID STIM HORMONE: CPT | Mod: ORL | Performed by: NURSE PRACTITIONER

## 2022-02-23 PROCEDURE — P9604 ONE-WAY ALLOW PRORATED TRIP: HCPCS | Mod: ORL | Performed by: NURSE PRACTITIONER

## 2022-02-27 ENCOUNTER — NURSING HOME VISIT (OUTPATIENT)
Dept: GERIATRICS | Facility: CLINIC | Age: 87
End: 2022-02-27
Payer: COMMERCIAL

## 2022-02-27 VITALS
TEMPERATURE: 98.4 F | SYSTOLIC BLOOD PRESSURE: 130 MMHG | DIASTOLIC BLOOD PRESSURE: 72 MMHG | RESPIRATION RATE: 20 BRPM | WEIGHT: 124.7 LBS | HEART RATE: 76 BPM | HEIGHT: 59 IN | BODY MASS INDEX: 25.14 KG/M2 | OXYGEN SATURATION: 95 %

## 2022-02-27 DIAGNOSIS — J45.40 MODERATE PERSISTENT ASTHMA, UNSPECIFIED WHETHER COMPLICATED: ICD-10-CM
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
## 2022-02-27 DIAGNOSIS — G31.84 MILD COGNITIVE IMPAIRMENT: ICD-10-CM

## 2022-02-27 DIAGNOSIS — E03.9 ACQUIRED HYPOTHYROIDISM: ICD-10-CM
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
## 2022-02-27 DIAGNOSIS — R54 FRAILTY: ICD-10-CM

## 2022-02-27 DIAGNOSIS — E78.5 HYPERLIPIDEMIA, UNSPECIFIED HYPERLIPIDEMIA TYPE: ICD-10-CM

## 2022-02-27 DIAGNOSIS — I10 ESSENTIAL HYPERTENSION, BENIGN: Primary | ICD-10-CM

## 2022-02-27 PROCEDURE — 99309 SBSQ NF CARE MODERATE MDM 30: CPT | Performed by: INTERNAL MEDICINE

## 2022-02-27 NOTE — LETTER
2/27/2022        RE: Brooke Xie  [address]  [address]  [place] [address]        [facility]  Chief Complaint   Patient presents with     group home Regulatory     Hypertension, asthma, CKD, generalized weakness.     [place] Medical Record Number:  [id number]  Place of Service where encounter took place:  [facility] () [[id number]]    HPI:      Brooke Xie  is 89 year old (1/28/1933), who is being seen today for a federally mandated E/M visit. Today's concerns are:  Review of multiple medical problems.    Currently denies any acute concern. Overall doing well.Denies fever or chills. No cough or cp or sob. No LH or dizziness. No NVD. No dysuria or bowel complaint. No new sensory or motor complaint. No new rash.      Brooke has history of essential hypertension, dyslipidemia, hypothyroidism, and asthma who was admitted on 10/05/2021 following presentation to [facility] ED for evaluation of fall at home.   She awoke on the morning of admission to use the bathroom when her walker tipped over causing her to fall backwards, hitting her right mid-back on some furniture on the way down. Endorsed pain at the site, denied pain elsewhere. No head injury or LOC.  He did report that she had had several falls in week prior.      In the ED, she was hypertensive to 200/116, otherwise vitally stable. Labs notable for K 2.6, PCO2 52. Negative troponin and creatinine WNL. EKG sinus rhythm with premature atrial complexes; no acute changes. XR of right ribs demonstrated multiple fractures. CT chest with displaced fractures involving the right 7th, 8th, and 9th posterior ribs, undisplaced fracture of right 6th posterior rib. Cervical spine and Head CT with no acute changes. Patient given KCl and Tylenol and admitted for further management.     On admission, purewick placed due to incontinence (only utilized in the hospital).  Surgery consulted saw no indication for further  intervention. Given lidocaine patch for upper right back. 1 dose labetalol given for elevated blood pressure with improvement to 178/84.  Hydrochlorothiazide stopped and amlodipine added at the time of discharge which should help hypokalemia.  She symptomatically improved and was subsequently discharged to to the TCU in stable condition on 10/06/2021.        ALLERGIES:Atorvastatin, Docosahexaenoic acid (dha), Fosamax, Niaspan [niacin], Omega-3-acid ethyl esters, and Zyrtec-d  PAST MEDICAL HISTORY:   Past Medical History:   Diagnosis Date     Asthma      CRI (chronic renal insufficiency), stage 3 (moderate) (H) 2/12/2020     HLD (hyperlipidemia)      HTN (hypertension)      Hypokalemia      Hypothyroidism      Multiple fractures of ribs of right side (6 through 9) with routine healing 10/14/2021     UTI (urinary tract infection)      Weakness of both lower extremities      PAST SURGICAL HISTORY:   has a past surgical history that includes ENT surgery; tonsillectomy; and Tonsillectomy.  FAMILY HISTORY: family history includes Breast Cancer in her sister and sister; C.A.D. in her father; Cancer in her father; Cancer - colorectal in her father; Coronary Artery Disease in her father; Melanoma in her mother.  SOCIAL HISTORY:  reports that she has never smoked. She has never used smokeless tobacco. She reports that she does not drink alcohol and does not use drugs.    MEDICATIONS:     Review of your medicines          Accurate as of February 27, 2022 11:59 PM. If you have any questions, ask your nurse or doctor.            CONTINUE these medicines which have NOT CHANGED      Dose / Directions   acetaminophen 500 MG tablet  Commonly known as: TYLENOL      Dose: 1,000 mg  Take 1,000 mg by mouth every 6 hours as needed  Refills: 0     amLODIPine 5 MG tablet  Commonly known as: NORVASC      Dose: 5 mg  Take 5 mg by mouth daily  Refills: 0     calcium citrate 250 MG Tabs      Dose: 1 tablet  Take 1 tablet by mouth  daily  Refills: 0     CALTRATE 600 PLUS-VIT D OR      Dose: 1 tablet  Take 1 tablet by mouth daily.  Refills: 0     cholecalciferol 25 MCG (1000 UT) Tabs  Used for: Hereditary and idiopathic peripheral neuropathy      Dose: 1 tablet  Take 1 tablet by mouth daily  Quantity: 90 tablet  Refills: 0     * coenzyme Q-10 200 MG Caps      Dose: 1 tablet  Take 1 tablet by mouth daily  Refills: 0     * co-enzyme Q-10 30 MG Caps capsule  Used for: Hereditary and idiopathic peripheral neuropathy      Dose: 30 mg  Take 1 capsule (30 mg) by mouth daily  Quantity: 90 capsule  Refills: 0     fluticasone-salmeterol 100-50 MCG/DOSE inhaler  Commonly known as: Advair Diskus  Used for: Uncomplicated asthma, unspecified asthma severity, unspecified whether persistent      Dose: 1 puff  Inhale 1 puff into the lungs every 12 hours  Quantity: 14 each  Refills: 0     hydrochlorothiazide 12.5 MG tablet  Commonly known as: HYDRODIURIL  Used for: Essential hypertension      Dose: 25 mg  Take 2 tablets (25 mg) by mouth daily  Quantity: 90 tablet  Refills: 0     ibuprofen 400 MG tablet  Commonly known as: ADVIL/MOTRIN      Dose: 400 mg  Take 400 mg by mouth every 6 hours as needed for moderate pain  Refills: 0     levothyroxine 50 MCG tablet  Commonly known as: SYNTHROID/LEVOTHROID      Dose: 50 mcg  Take 1 tablet (50 mcg) by mouth daily  Quantity: 90 tablet  Refills: 0     Lidocaine 4 % Patch  Commonly known as: LIDOCARE      Dose: 1 patch  Place 1 patch onto the skin every 24 hours To prevent lidocaine toxicity, patient should be patch free for 12 hrs daily.  Refills: 0     multivitamin tablet  Used for: Hereditary and idiopathic peripheral neuropathy      Dose: 1 tablet  Take 1 tablet by mouth daily  Quantity: 90 tablet  Refills: 0     order for DME  Used for: Balance problems      Equipment being ordered: Cane  Quantity: 1 Units  Refills: 0     oxyCODONE 5 MG tablet  Commonly known as: ROXICODONE      Dose: 2.5 mg  Take 2.5 mg by mouth every  "4 hours as needed for severe pain  Refills: 0     potassium chloride ER 10 MEQ CR tablet  Commonly known as: K-TAB/KLOR-CON  Used for: Diuretic-induced hypokalemia      Dose: 10 mEq  Take 1 tablet (10 mEq) by mouth 2 times daily  Quantity: 180 tablet  Refills: 0     simvastatin 20 MG tablet  Commonly known as: ZOCOR  Used for: Mixed hyperlipidemia      Dose: 20 mg  Take 1 tablet (20 mg) by mouth daily  Quantity: 90 tablet  Refills: 0     Ventolin  (90 Base) MCG/ACT inhaler  Generic drug: albuterol      Dose: 2 puff  Inhale 2 puffs into the lungs 4 times daily as needed  Refills: 0         * This list has 2 medication(s) that are the same as other medications prescribed for you. Read the directions carefully, and ask your doctor or other care provider to review them with you.               Case Management:  I have reviewed the care plan and MDS and do agree with the plan. Patient's desire to return to the community is not present. Information reviewed:  Medications, vital signs, orders, and nursing notes.    ROS:  4 point ROS including Respiratory, CV, GI and , other than that noted in the HPI,  is negative    Vitals:  /72   Pulse 76   Temp 98.4  F (36.9  C)   Resp 20   Ht 1.499 m (4' 11\")   Wt 56.6 kg (124 lb 11.2 oz)   SpO2 95%   BMI 25.19 kg/m    Body mass index is 25.19 kg/m .  Exam:    General Appearance: Awake, interactive, NAD. Laying on bed.   HEENT: AT/NC, Anicteric, Moist MM  Neck: Supple.   Respiratory: Normal work of breathing.   Cardiovascular: RRR  GI/Abd: Soft. NT. ND.   Extremities: Distally wwp. No pedal edema.  Neuro: At baseline.    Skin: No acute rash on exposed areas.   Psychiatry: Stable mood.    Lab/Diagnostic data:   Labs done in SNF are in [place] EPIC. Please refer to them using theDrop/Care Everywhere.    ASSESSMENT/PLAN  1. Essential hypertension, benign goal <150    2. Hyperlipidemia, unspecified hyperlipidemia type    3. Acquired hypothyroidism    4. Moderate " persistent asthma, unspecified whether complicated    5. Fall at home, subsequent encounter    6. Mild cognitive impairment    7. Frailty      Doing well     Changes: none.   Plan: Continue current care plan. Medications as above including amlodipine, simvastatin, levothyroxine, ventolin. Advair.      nursing staff.     Electronically signed by:  Anthony Pereira MD            Sincerely,        Anthony Pereira MD

## 2022-03-14 NOTE — PROGRESS NOTES
[facility] GERIATRICS  Chief Complaint   Patient presents with     intermediate Regulatory     Hypertension, asthma, CKD, generalized weakness.     [place] Medical Record Number:  [id number]  Place of Service where encounter took place:  [facility] () [[id number]]    HPI:      Brooke Xie  is 89 year old (1/28/1933), who is being seen today for a federally mandated E/M visit. Today's concerns are:  Review of multiple medical problems.    Currently denies any acute concern. Overall doing well.Denies fever or chills. No cough or cp or sob. No LH or dizziness. No NVD. No dysuria or bowel complaint. No new sensory or motor complaint. No new rash.      Brooke has history of essential hypertension, dyslipidemia, hypothyroidism, and asthma who was admitted on 10/05/2021 following presentation to [facility] ED for evaluation of fall at home.   She awoke on the morning of admission to use the bathroom when her walker tipped over causing her to fall backwards, hitting her right mid-back on some furniture on the way down. Endorsed pain at the site, denied pain elsewhere. No head injury or LOC.  He did report that she had had several falls in week prior.      In the ED, she was hypertensive to 200/116, otherwise vitally stable. Labs notable for K 2.6, PCO2 52. Negative troponin and creatinine WNL. EKG sinus rhythm with premature atrial complexes; no acute changes. XR of right ribs demonstrated multiple fractures. CT chest with displaced fractures involving the right 7th, 8th, and 9th posterior ribs, undisplaced fracture of right 6th posterior rib. Cervical spine and Head CT with no acute changes. Patient given KCl and Tylenol and admitted for further management.     On admission, purewick placed due to incontinence (only utilized in the hospital).  Surgery consulted saw no indication for further intervention. Given lidocaine patch for upper right back. 1 dose labetalol given for elevated blood pressure with  improvement to 178/84.  Hydrochlorothiazide stopped and amlodipine added at the time of discharge which should help hypokalemia.  She symptomatically improved and was subsequently discharged to to the TCU in stable condition on 10/06/2021.        ALLERGIES:Atorvastatin, Docosahexaenoic acid (dha), Fosamax, Niaspan [niacin], Omega-3-acid ethyl esters, and Zyrtec-d  PAST MEDICAL HISTORY:   Past Medical History:   Diagnosis Date     Asthma      CRI (chronic renal insufficiency), stage 3 (moderate) (H) 2/12/2020     HLD (hyperlipidemia)      HTN (hypertension)      Hypokalemia      Hypothyroidism      Multiple fractures of ribs of right side (6 through 9) with routine healing 10/14/2021     UTI (urinary tract infection)      Weakness of both lower extremities      PAST SURGICAL HISTORY:   has a past surgical history that includes ENT surgery; tonsillectomy; and Tonsillectomy.  FAMILY HISTORY: family history includes Breast Cancer in her sister and sister; C.A.D. in her father; Cancer in her father; Cancer - colorectal in her father; Coronary Artery Disease in her father; Melanoma in her mother.  SOCIAL HISTORY:  reports that she has never smoked. She has never used smokeless tobacco. She reports that she does not drink alcohol and does not use drugs.    MEDICATIONS:     Review of your medicines          Accurate as of February 27, 2022 11:59 PM. If you have any questions, ask your nurse or doctor.            CONTINUE these medicines which have NOT CHANGED      Dose / Directions   acetaminophen 500 MG tablet  Commonly known as: TYLENOL      Dose: 1,000 mg  Take 1,000 mg by mouth every 6 hours as needed  Refills: 0     amLODIPine 5 MG tablet  Commonly known as: NORVASC      Dose: 5 mg  Take 5 mg by mouth daily  Refills: 0     calcium citrate 250 MG Tabs      Dose: 1 tablet  Take 1 tablet by mouth daily  Refills: 0     CALTRATE 600 PLUS-VIT D OR      Dose: 1 tablet  Take 1 tablet by mouth daily.  Refills: 0      cholecalciferol 25 MCG (1000 UT) Tabs  Used for: Hereditary and idiopathic peripheral neuropathy      Dose: 1 tablet  Take 1 tablet by mouth daily  Quantity: 90 tablet  Refills: 0     * coenzyme Q-10 200 MG Caps      Dose: 1 tablet  Take 1 tablet by mouth daily  Refills: 0     * co-enzyme Q-10 30 MG Caps capsule  Used for: Hereditary and idiopathic peripheral neuropathy      Dose: 30 mg  Take 1 capsule (30 mg) by mouth daily  Quantity: 90 capsule  Refills: 0     fluticasone-salmeterol 100-50 MCG/DOSE inhaler  Commonly known as: Advair Diskus  Used for: Uncomplicated asthma, unspecified asthma severity, unspecified whether persistent      Dose: 1 puff  Inhale 1 puff into the lungs every 12 hours  Quantity: 14 each  Refills: 0     hydrochlorothiazide 12.5 MG tablet  Commonly known as: HYDRODIURIL  Used for: Essential hypertension      Dose: 25 mg  Take 2 tablets (25 mg) by mouth daily  Quantity: 90 tablet  Refills: 0     ibuprofen 400 MG tablet  Commonly known as: ADVIL/MOTRIN      Dose: 400 mg  Take 400 mg by mouth every 6 hours as needed for moderate pain  Refills: 0     levothyroxine 50 MCG tablet  Commonly known as: SYNTHROID/LEVOTHROID      Dose: 50 mcg  Take 1 tablet (50 mcg) by mouth daily  Quantity: 90 tablet  Refills: 0     Lidocaine 4 % Patch  Commonly known as: LIDOCARE      Dose: 1 patch  Place 1 patch onto the skin every 24 hours To prevent lidocaine toxicity, patient should be patch free for 12 hrs daily.  Refills: 0     multivitamin tablet  Used for: Hereditary and idiopathic peripheral neuropathy      Dose: 1 tablet  Take 1 tablet by mouth daily  Quantity: 90 tablet  Refills: 0     order for DME  Used for: Balance problems      Equipment being ordered: Cane  Quantity: 1 Units  Refills: 0     oxyCODONE 5 MG tablet  Commonly known as: ROXICODONE      Dose: 2.5 mg  Take 2.5 mg by mouth every 4 hours as needed for severe pain  Refills: 0     potassium chloride ER 10 MEQ CR tablet  Commonly known as:  "K-TAB/KLOR-CON  Used for: Diuretic-induced hypokalemia      Dose: 10 mEq  Take 1 tablet (10 mEq) by mouth 2 times daily  Quantity: 180 tablet  Refills: 0     simvastatin 20 MG tablet  Commonly known as: ZOCOR  Used for: Mixed hyperlipidemia      Dose: 20 mg  Take 1 tablet (20 mg) by mouth daily  Quantity: 90 tablet  Refills: 0     Ventolin  (90 Base) MCG/ACT inhaler  Generic drug: albuterol      Dose: 2 puff  Inhale 2 puffs into the lungs 4 times daily as needed  Refills: 0         * This list has 2 medication(s) that are the same as other medications prescribed for you. Read the directions carefully, and ask your doctor or other care provider to review them with you.               Case Management:  I have reviewed the care plan and MDS and do agree with the plan. Patient's desire to return to the community is not present. Information reviewed:  Medications, vital signs, orders, and nursing notes.    ROS:  4 point ROS including Respiratory, CV, GI and , other than that noted in the HPI,  is negative    Vitals:  /72   Pulse 76   Temp 98.4  F (36.9  C)   Resp 20   Ht 1.499 m (4' 11\")   Wt 56.6 kg (124 lb 11.2 oz)   SpO2 95%   BMI 25.19 kg/m    Body mass index is 25.19 kg/m .  Exam:    General Appearance: Awake, interactive, NAD. Laying on bed.   HEENT: AT/NC, Anicteric, Moist MM  Neck: Supple.   Respiratory: Normal work of breathing.   Cardiovascular: RRR  GI/Abd: Soft. NT. ND.   Extremities: Distally wwp. No pedal edema.  Neuro: At baseline.    Skin: No acute rash on exposed areas.   Psychiatry: Stable mood.    Lab/Diagnostic data:   Labs done in SNF are in [place] EPIC. Please refer to them using EPIC/Care Everywhere.    ASSESSMENT/PLAN  1. Essential hypertension, benign goal <150    2. Hyperlipidemia, unspecified hyperlipidemia type    3. Acquired hypothyroidism    4. Moderate persistent asthma, unspecified whether complicated    5. Fall at home, subsequent encounter    6. Mild cognitive " impairment    7. Frailty      Doing well     Changes: none.   Plan: Continue current care plan. Medications as above including amlodipine, simvastatin, levothyroxine, ventolin. Advair.     luis carlos nursing staff.     Electronically signed by:  Anthony Pereira MD

## 2022-03-18 DIAGNOSIS — T50.2X5A DIURETIC-INDUCED HYPOKALEMIA: ICD-10-CM

## 2022-03-18 DIAGNOSIS — E87.6 DIURETIC-INDUCED HYPOKALEMIA: ICD-10-CM

## 2022-03-18 RX ORDER — POTASSIUM CHLORIDE 750 MG/1
10 TABLET, EXTENDED RELEASE ORAL 2 TIMES DAILY
Qty: 180 TABLET | Refills: 0 | Status: SHIPPED | OUTPATIENT
Start: 2022-03-18 | End: 2022-01-01

## 2022-04-07 ENCOUNTER — NURSING HOME VISIT (OUTPATIENT)
Dept: GERIATRICS | Facility: CLINIC | Age: 87
End: 2022-04-07
Payer: COMMERCIAL

## 2022-04-07 VITALS
SYSTOLIC BLOOD PRESSURE: 130 MMHG | HEIGHT: 59 IN | HEART RATE: 77 BPM | DIASTOLIC BLOOD PRESSURE: 86 MMHG | RESPIRATION RATE: 20 BRPM | TEMPERATURE: 98.1 F | WEIGHT: 124.8 LBS | BODY MASS INDEX: 25.16 KG/M2 | OXYGEN SATURATION: 96 %

## 2022-04-07 DIAGNOSIS — R54 FRAILTY: Primary | ICD-10-CM

## 2022-04-07 DIAGNOSIS — E03.9 ACQUIRED HYPOTHYROIDISM: ICD-10-CM

## 2022-04-07 DIAGNOSIS — G31.84 MILD COGNITIVE IMPAIRMENT: ICD-10-CM

## 2022-04-07 DIAGNOSIS — E78.5 DYSLIPIDEMIA: ICD-10-CM

## 2022-04-07 DIAGNOSIS — J45.40 MODERATE PERSISTENT ASTHMA, UNSPECIFIED WHETHER COMPLICATED: ICD-10-CM

## 2022-04-07 DIAGNOSIS — I10 ESSENTIAL HYPERTENSION, BENIGN: ICD-10-CM

## 2022-04-07 DIAGNOSIS — G60.9 HEREDITARY AND IDIOPATHIC PERIPHERAL NEUROPATHY: ICD-10-CM

## 2022-04-07 NOTE — LETTER
4/7/2022        RE: Brooke Xie  [address]  [address]  [place] [address]        A user error has taken place: encounter opened in error, closed for administrative reasons.          Sincerely,        ALLIE Freeman CNP

## 2022-04-11 ENCOUNTER — NURSING HOME VISIT (OUTPATIENT)
Dept: GERIATRICS | Facility: CLINIC | Age: 87
End: 2022-04-11
Payer: COMMERCIAL

## 2022-04-11 VITALS
OXYGEN SATURATION: 97 % | BODY MASS INDEX: 25.16 KG/M2 | WEIGHT: 124.8 LBS | HEIGHT: 59 IN | RESPIRATION RATE: 20 BRPM | HEART RATE: 77 BPM | DIASTOLIC BLOOD PRESSURE: 86 MMHG | TEMPERATURE: 98.4 F | SYSTOLIC BLOOD PRESSURE: 130 MMHG

## 2022-04-11 DIAGNOSIS — E78.5 DYSLIPIDEMIA: ICD-10-CM

## 2022-04-11 DIAGNOSIS — R54 FRAILTY: Primary | ICD-10-CM

## 2022-04-11 DIAGNOSIS — G31.84 MILD COGNITIVE IMPAIRMENT: ICD-10-CM

## 2022-04-11 DIAGNOSIS — E03.9 ACQUIRED HYPOTHYROIDISM: ICD-10-CM

## 2022-04-11 DIAGNOSIS — J45.40 MODERATE PERSISTENT ASTHMA, UNSPECIFIED WHETHER COMPLICATED: ICD-10-CM

## 2022-04-11 DIAGNOSIS — I10 ESSENTIAL HYPERTENSION, BENIGN: ICD-10-CM

## 2022-04-11 DIAGNOSIS — G60.9 HEREDITARY AND IDIOPATHIC PERIPHERAL NEUROPATHY: ICD-10-CM

## 2022-04-11 PROCEDURE — 99318 PR ANNUAL NURSING FAC ASSESSMNT, STABLE: CPT | Performed by: NURSE PRACTITIONER

## 2022-04-11 NOTE — LETTER
2022        RE: Brooke Xie  [address]  [address]  [place] [address]        [facility]    Name:   Brooke Xie  :   1933  MRN:    [id number]     Facility:   [facility] () [[id number]]   Room: [facility] / Sister Sofiya Mckee  Code Status: DNR and POLST AVAILABLE -     DOS: 2022  Previous visit: 2021 and subsequently seen by Dr. Pereira on 2022.    PCP:  Martin Walton    CHIEF COMPLAINT / REASON FOR VISIT:  Chief Complaint   Patient presents with     FVP Care Coordination - Home Visit-Annual Assessment     Chronic problems include hypertension, asthma, CKD stage II, generalized weakness.      [facility] from 10/05/2021 until 10/06/2021 (lower extremity weakness, multiple fractures of right ribs)      HPI: Brooke is a 89 year old female with history of essential hypertension, dyslipidemia, hypothyroidism, and asthma who was admitted on 10/05/2021 following presentation to [facility] ED for evaluation of fall at home.     She awoke on the morning of admission to use the bathroom when her walker tipped over causing her to fall backwards, hitting her right mid-back on some furniture on the way down. Endorsed pain at the site, denied pain elsewhere. No head injury or LOC.  He did report that she had had several falls in week prior.      In the ED, she was hypertensive to 200/116, otherwise vitally stable. Labs notable for K 2.6, PCO2 52. Negative troponin and creatinine WNL. EKG sinus rhythm with premature atrial complexes; no acute changes. XR of right ribs demonstrated multiple fractures. CT chest with displaced fractures involving the right 7th, 8th, and 9th posterior ribs, undisplaced fracture of right 6th posterior rib. Cervical spine and Head CT with no acute changes. Patient given KCl and Tylenol and admitted for further management.     On admission, purewick placed due to incontinence (only utilized in the hospital).   "Surgery consulted saw no indication for further intervention. Given lidocaine patch for upper right back. 1 dose labetalol given for elevated blood pressure with improvement to 178/84.  Hydrochlorothiazide stopped and amlodipine added at the time of discharge which should help hypokalemia.  She symptomatically improved and was subsequently discharged to to the TCU in stable condition on 10/06/2021.       CURRENT/RECENT TCU ISSUES    Disposition:  My initial time spent with her seemed to suggest that she was alert and oriented; however I did receive a request for SLP to evaluate for cognition, and cognitive deficits became apparent.  At my first visit, she asked, \"And who are you?\"  She also did not adjust the bed before sitting on the edge to have breakfast, and she was tilting precariously while eating breakfast. She had a bit of difficulty with the correct year and, in fact, she believed she was only 80 at first. She did tell me, after discovering she was 88, that she would be happy to live to 89.  TODAY, as usual, the patient is found lying in bed watching television.  She denies any pain.  It is very hot in the room, as she likes to keep the thermostat all the way up..  She asks who I am.  She no longer has the pain, and her appetite has improved.  She is having regular bowel movements.    Today, she asks, \"who are you?\"  After I inform her, she tells me, \"well, I'm going to be getting out of here.\"  When?  \"Sometime at the end of the month.  We got a place.\"  Apparently, it is an assisted living where she will be able to stay with her .  Even with his lack of vision, she thought her  to do laundry and cook meals, using \"little bump dots.\"  She tells me her first  .  He was a \"CPA, a certified pain in the ass.\"  He was also an alcoholic.    Lives in an apartment with mostly elderly although not specifically for seniors.  There is an elevator. Says there is a neighbor who is helpful.  " When seen earlier, she told me she would like to go home to her .  Her , she says, is 72 years old and blind. Currently, ambulatory with a 2-wheeled walker, transfer belt, and CGA.    Discharge planning: She has spent quite some time in the TCU and now long-term care.  She is quite anxious to leave.     ROS:   She denies any pain. No headaches, nausea or vomiting, dizziness or dyspnea, dysuria, constipation or diarrhea, difficulty chewing or swallowing, integumentary issues, or problems with appetite or sleep.      Past Medical History:   Diagnosis Date     Asthma      CRI (chronic renal insufficiency), stage 3 (moderate) (H) 2020     HLD (hyperlipidemia)      HTN (hypertension)      Hypokalemia      Hypothyroidism      Multiple fractures of ribs of right side (6 through 9) with routine healing 10/14/2021     UTI (urinary tract infection)      Weakness of both lower extremities               Family History   Problem Relation Age of Onset     Cancer - colorectal Father          age 94     C.A.D. Father          age 94     Melanoma Mother      Breast Cancer Sister          age 54     Cancer Father         colorectal      Coronary Artery Disease Father      Breast Cancer Sister      Social History     Socioeconomic History     Marital status:      Spouse name: None     Number of children: None     Years of education: None     Highest education level: None   Occupational History     None   Tobacco Use     Smoking status: Never Smoker     Smokeless tobacco: Never Used   Substance and Sexual Activity     Alcohol use: No     Drug use: No     Sexual activity: None   Other Topics Concern     Parent/sibling w/ CABG, MI or angioplasty before 65F 55M? Not Asked   Social History Narrative     None     Social Determinants of Health     Financial Resource Strain:      Difficulty of Paying Living Expenses:    Food Insecurity:      Worried About Running Out of Food in the Last Year:      Ran Out  of Food in the Last Year:    Transportation Needs:      Lack of Transportation (Medical):      Lack of Transportation (Non-Medical):    Physical Activity:      Days of Exercise per Week:      Minutes of Exercise per Session:    Stress:      Feeling of Stress :    Social Connections:      Frequency of Communication with Friends and Family:      Frequency of Social Gatherings with Friends and Family:      Attends Hindu Services:      Active Member of Clubs or Organizations:      Attends Club or Organization Meetings:      Marital Status:    Intimate Partner Violence:      Fear of Current or Ex-Partner:      Emotionally Abused:      Physically Abused:      Sexually Abused:        MEDICATIONS: Reviewed from the MAR, physician orders, and/or earlier progress notes.  Post Discharge Medication Reconciliation Status: medication reconcilation previously completed during another office visit.    Current Outpatient Medications   Medication Sig     acetaminophen (TYLENOL) 500 MG tablet Take 1,000 mg by mouth every 6 hours as needed     albuterol (VENTOLIN HFA) 108 (90 Base) MCG/ACT inhaler Inhale 2 puffs into the lungs 4 times daily as needed     amLODIPine (NORVASC) 5 MG tablet Take 5 mg by mouth daily     calcium citrate 250 MG TABS Take 1 tablet by mouth daily     Calcium-Vitamin D (CALTRATE 600 PLUS-VIT D OR) Take 1 tablet by mouth daily. (Patient not taking: Reported on 10/13/2021)     cholecalciferol 25 MCG (1000 UT) TABS Take 1 tablet by mouth daily (Patient not taking: Reported on 10/13/2021)     co-enzyme Q-10 30 MG CAPS capsule Take 1 capsule (30 mg) by mouth daily (Patient not taking: Reported on 10/13/2021)     coenzyme Q-10 200 MG CAPS Take 1 tablet by mouth daily     fluticasone-salmeterol (ADVAIR DISKUS) 100-50 MCG/DOSE inhaler Inhale 1 puff into the lungs every 12 hours     hydrochlorothiazide (HYDRODIURIL) 12.5 MG tablet Take 2 tablets (25 mg) by mouth daily (Patient not taking: Reported on 10/13/2021)      "ibuprofen (ADVIL/MOTRIN) 400 MG tablet Take 400 mg by mouth every 6 hours as needed for moderate pain     levothyroxine (SYNTHROID/LEVOTHROID) 50 MCG tablet Take 1 tablet (50 mcg) by mouth daily     Lidocaine (LIDOCARE) 4 % Patch Place 1 patch onto the skin every 24 hours To prevent lidocaine toxicity, patient should be patch free for 12 hrs daily.     multivitamin (CENTRUM SILVER) tablet Take 1 tablet by mouth daily     ORDER FOR DME Equipment being ordered: Cane     oxyCODONE (ROXICODONE) 5 MG tablet Take 2.5 mg by mouth every 4 hours as needed for severe pain     potassium chloride ER (K-TAB/KLOR-CON) 10 MEQ CR tablet Take 1 tablet (10 mEq) by mouth 2 times daily     simvastatin (ZOCOR) 20 MG tablet Take 1 tablet (20 mg) by mouth daily     No current facility-administered medications for this visit.     ALLERGIES:   Allergies   Allergen Reactions     Atorvastatin      Other reaction(s): Muscle Cramps  Muscle aches     Docosahexaenoic Acid (Dha)      Fosamax      Niaspan [Niacin]      Omega-3-Acid Ethyl Esters      Zyrtec-D      DIET: Regular, regular texture, thin liquids.  Mighty Shake nutritional supplement.    Vitals:    04/11/22 2109   BP: 130/86   Pulse: 77   Resp: 20   Temp: 98.4  F (36.9  C)   SpO2: 97%   Weight: 56.6 kg (124 lb 12.8 oz)   Height: 1.499 m (4' 11\")     Body mass index is 25.21 kg/m .    EXAMINATION:   General: Pleasant, alert, and conversant elderly female, resting in bed, in no apparent distress.  The TV is not on.  She says maintenance needs to come fix it.  It is extremely hot in the room.  The heat is turned all the way up.  Head: Normocephalic and atraumatic.   Eyes: PERRLA, sclerae clear.   ENT: Moist oral mucosa.  She has her own teeth.  No rhinorrhea or nasal discharge.  Hearing is adequate for conversation in a quiet room.  Cardiovascular: Regular rate and rhythm without appreciable murmur.   Respiratory: Lungs clear to auscultation bilaterally.   Abdomen: Nondistended. "   Musculoskeletal/Extremities: Age-related degenerative joint disease.  Bilateral hallux valgus deformities, right greater than left with great toe crossover.  No peripheral edema.  Integument: No rashes, clinically significant lesions, or skin breakdown.  There are, however, fungal toenails.  Cognitive/Psychiatric: Appears alert and oriented, although there are at least minor cognitive deficits.  Affect is euthymic.    DIAGNOSTICS:   No results found for this or any previous visit (from the past 240 hour(s)).   Last Comprehensive Metabolic Panel:  Sodium   Date Value Ref Range Status   10/09/2021 138 136 - 145 mmol/L Final   12/14/2020 132.2 (L) 132.6 - 141.4 mmol/L Final     Potassium   Date Value Ref Range Status   10/09/2021 4.0 3.5 - 5.0 mmol/L Final   12/14/2020 3.2 (L) 3.3 - 4.5 mmol/L Final     Chloride   Date Value Ref Range Status   10/09/2021 104 98 - 107 mmol/L Final   12/14/2020 97.4 (L) 98.0 - 110.0 mmol/L Final     Carbon Dioxide   Date Value Ref Range Status   12/14/2020 29.8 20.0 - 32.0 mmol/L Final     Carbon Dioxide (CO2)   Date Value Ref Range Status   10/09/2021 24 22 - 31 mmol/L Final     Anion Gap   Date Value Ref Range Status   10/09/2021 10 5 - 18 mmol/L Final   12/15/2014 4 3 - 14 mmol/L Final     Glucose   Date Value Ref Range Status   10/09/2021 94 70 - 125 mg/dL Final   12/14/2020 92.4 70.0 - 99.0 mg'dL Final     Urea Nitrogen   Date Value Ref Range Status   10/09/2021 33 (H) 8 - 28 mg/dL Final   12/14/2020 16.4 7.0 - 19.0 mg/dL Final     Creatinine   Date Value Ref Range Status   10/09/2021 0.81 0.60 - 1.10 mg/dL Final   12/14/2020 0.9 0.5 - 1.0 mg/dL Final     GFR Estimate   Date Value Ref Range Status   10/09/2021 65 >60 mL/min/1.73m2 Final     Comment:     As of July 11, 2021, eGFR is calculated by the CKD-EPI creatinine equation, without race adjustment. eGFR can be influenced by muscle mass, exercise, and diet. The reported eGFR is an estimation only and is only applicable if the  renal function is stable.   12/14/2020 67.0 >60.0 mL/min/1.7 m2 Final     Calcium   Date Value Ref Range Status   10/09/2021 9.3 8.5 - 10.5 mg/dL Final   12/14/2020 9.7 8.5 - 10.1 mg/dL Final       ASSESSMENT/Plan:      ICD-10-CM    1. Frailty  R54    2. Moderate persistent asthma, unspecified whether complicated  J45.40    3. Essential hypertension, benign goal <150  I10    4. Mild cognitive impairment  G31.84    5. Acquired hypothyroidism  E03.9    6. Hereditary and idiopathic peripheral neuropathy  G60.9    7. Dyslipidemia  E78.5        CASE MANAGEMENT:  I have reviewed the facility/SNF care plan/MDS which was done recently, including the falls risk, nutrition and pain screening. I also reviewed the current immunizations, and preventive care..Future cancer screening is not clinically indicated secondary to age/goals of care Patient's desire to return to the community is present and will be discharging to assisted living. Current Level of Care is appropriate.    Advance Directive Discussion:    I reviewed the current advanced directives as reflected in EPIC, the POLST and the facility chart, and verified the congruency of orders. I did review the advance directives with the resident.  While she would not mind living to 100, she is DNR.    Team Discussion:  I communicated with the appropriate disciplines involved with the Plan of Care:   Nursing      Patient Goal:  Patient's goal is pain control and comfort.    Information reviewed:  Medications, vital signs, orders, and nursing notes.    CHANGES:    None.    CARE PLAN:  The care plan, medications, vital signs, orders, and nursing notes have been reviewed, and orders signed.  Changes to care plan, if any, as noted.  Otherwise, continue current plan of care.    The above has been created using voice recognition software. Please be aware that this may unintentionally  produce inaccuracies and/or nonsensical sentences.      Electronically signed by: Srinath WASHINGTON  ALLIE Ballard CNP        Sincerely,        ALLIE Freeman CNP

## 2022-04-18 ENCOUNTER — NURSING HOME VISIT (OUTPATIENT)
Dept: GERIATRICS | Facility: CLINIC | Age: 87
End: 2022-04-18
Payer: COMMERCIAL

## 2022-04-18 VITALS
OXYGEN SATURATION: 98 % | BODY MASS INDEX: 24.92 KG/M2 | WEIGHT: 123.6 LBS | SYSTOLIC BLOOD PRESSURE: 127 MMHG | DIASTOLIC BLOOD PRESSURE: 68 MMHG | HEIGHT: 59 IN | HEART RATE: 80 BPM | RESPIRATION RATE: 18 BRPM | TEMPERATURE: 98 F

## 2022-04-18 DIAGNOSIS — G31.84 MILD COGNITIVE IMPAIRMENT: ICD-10-CM

## 2022-04-18 DIAGNOSIS — I10 ESSENTIAL HYPERTENSION, BENIGN: ICD-10-CM

## 2022-04-18 DIAGNOSIS — R54 FRAILTY: Primary | ICD-10-CM

## 2022-04-18 DIAGNOSIS — J45.40 MODERATE PERSISTENT ASTHMA, UNSPECIFIED WHETHER COMPLICATED: ICD-10-CM

## 2022-04-18 DIAGNOSIS — E03.9 ACQUIRED HYPOTHYROIDISM: ICD-10-CM

## 2022-04-18 DIAGNOSIS — E78.5 DYSLIPIDEMIA: ICD-10-CM

## 2022-04-18 DIAGNOSIS — G60.9 HEREDITARY AND IDIOPATHIC PERIPHERAL NEUROPATHY: ICD-10-CM

## 2022-04-18 PROCEDURE — 99316 NF DSCHRG MGMT 30 MIN+: CPT | Performed by: NURSE PRACTITIONER

## 2022-04-18 NOTE — LETTER
2022        RE: Brooke Xie  [address]  [address]  [facility] [place] [address]        [facility]    Name:   Brooke Xie  :   1933  MRN:    [id number]     Facility:   [facility] () [[id number]]   Room: LTC / Sister Sofiya Mckee  Code Status: DNR and POLST AVAILABLE -     DOS: 2022  Previous visit: 2022    PCP:  aMrtin Walton    CHIEF COMPLAINT / REASON FOR VISIT:  Chief Complaint   Patient presents with     Discharge Summary Nursing Home      [facility] from 10/05/2021 until 10/06/2021 (lower extremity weakness, multiple fractures of right ribs)      HPI: Brooke is a 89 year old female with history of essential hypertension, dyslipidemia, hypothyroidism, and asthma who was admitted on 10/05/2021 following presentation to [facility] ED for evaluation of fall at home.     She awoke on the morning of admission to use the bathroom when her walker tipped over causing her to fall backwards, hitting her right mid-back on some furniture on the way down. Endorsed pain at the site, denied pain elsewhere. No head injury or LOC.  He did report that she had had several falls in week prior.      In the ED, she was hypertensive to 200/116, otherwise vitally stable. Labs notable for K 2.6, PCO2 52. Negative troponin and creatinine WNL. EKG sinus rhythm with premature atrial complexes; no acute changes. XR of right ribs demonstrated multiple fractures. CT chest with displaced fractures involving the right 7th, 8th, and 9th posterior ribs, undisplaced fracture of right 6th posterior rib. Cervical spine and Head CT with no acute changes. Patient given KCl and Tylenol and admitted for further management.     On admission, purewick placed due to incontinence (only utilized in the hospital).  Surgery consulted saw no indication for further intervention. Given lidocaine patch for upper right back. 1 dose labetalol given for elevated blood pressure with  "improvement to 178/84.  Hydrochlorothiazide stopped and amlodipine added at the time of discharge which should help hypokalemia.  She symptomatically improved and was subsequently discharged to to the TCU in stable condition on 10/06/2021.       CURRENT/RECENT TCU ISSUES    Disposition:  My initial time spent with her seemed to suggest that she was alert and oriented; however I did receive a request for SLP to evaluate for cognition, and cognitive deficits became apparent.  At my first visit, she asked, \"And who are you?\"  She also did not adjust the bed before sitting on the edge to have breakfast, and she was tilting precariously while eating breakfast. She has a bit of difficulty with the correct year and, in fact, she believed she was only 80 at first. She did tell me, after discovering she was 88, that she would be happy to live to 89.  Today, the patient is found lying in bed watching television (as I found her last time).  It is ungodly hot in the room.  She asks who I am.  She no longer has the pain, and her appetite has improved.  She is having regular bowel movements.    Lives in an apartment with mostly elderly although not specifically for seniors.  There is an elevator. Says there is a neighbor who is helpful.  When seen earlier, she told me she would like to go home to her .  Her , she says, is 72 years old and blind. Currently, ambulatory with a 2-wheeled walker, transfer belt, and CGA.    Discharge planning: She is quite anxious to leave.  \"I am going to leave as soon as possible,\" she says.  She says that she will be moving to assisted living arrangements.    ROS:   She denies any pain. No headaches, nausea or vomiting, dizziness or dyspnea, dysuria, constipation or diarrhea, difficulty chewing or swallowing, integumentary issues, or problems with appetite or sleep.      Past Medical History:   Diagnosis Date     Asthma      CRI (chronic renal insufficiency), stage 3 (moderate) (H) " 2020     HLD (hyperlipidemia)      HTN (hypertension)      Hypokalemia      Hypothyroidism      Multiple fractures of ribs of right side (6 through 9) with routine healing 10/14/2021     UTI (urinary tract infection)      Weakness of both lower extremities               Family History   Problem Relation Age of Onset     Cancer - colorectal Father          age 94     C.A.D. Father          age 94     Melanoma Mother      Breast Cancer Sister          age 54     Cancer Father         colorectal      Coronary Artery Disease Father      Breast Cancer Sister      Social History     Socioeconomic History     Marital status:      Spouse name: None     Number of children: None     Years of education: None     Highest education level: None   Occupational History     None   Tobacco Use     Smoking status: Never Smoker     Smokeless tobacco: Never Used   Substance and Sexual Activity     Alcohol use: No     Drug use: No     Sexual activity: None   Other Topics Concern     Parent/sibling w/ CABG, MI or angioplasty before 65F 55M? Not Asked   Social History Narrative     None     Social Determinants of Health     Financial Resource Strain:      Difficulty of Paying Living Expenses:    Food Insecurity:      Worried About Running Out of Food in the Last Year:      Ran Out of Food in the Last Year:    Transportation Needs:      Lack of Transportation (Medical):      Lack of Transportation (Non-Medical):    Physical Activity:      Days of Exercise per Week:      Minutes of Exercise per Session:    Stress:      Feeling of Stress :    Social Connections:      Frequency of Communication with Friends and Family:      Frequency of Social Gatherings with Friends and Family:      Attends [employer] Services:      Active Member of Clubs or Organizations:      Attends Club or Organization Meetings:      Marital Status:    Intimate Partner Violence:      Fear of Current or Ex-Partner:      Emotionally Abused:       Physically Abused:      Sexually Abused:        MEDICATIONS: Reviewed from the MAR, physician orders, and/or earlier progress notes.  Post Discharge Medication Reconciliation Status: medication reconcilation previously completed during another office visit.    Current Outpatient Medications   Medication Sig     acetaminophen (TYLENOL) 500 MG tablet Take 1,000 mg by mouth every 6 hours as needed     albuterol (VENTOLIN HFA) 108 (90 Base) MCG/ACT inhaler Inhale 2 puffs into the lungs 4 times daily as needed     amLODIPine (NORVASC) 5 MG tablet Take 5 mg by mouth daily     calcium citrate 250 MG TABS Take 1 tablet by mouth daily     Calcium-Vitamin D (CALTRATE 600 PLUS-VIT D OR) Take 1 tablet by mouth daily. (Patient not taking: Reported on 10/13/2021)     cholecalciferol 25 MCG (1000 UT) TABS Take 1 tablet by mouth daily (Patient not taking: Reported on 10/13/2021)     co-enzyme Q-10 30 MG CAPS capsule Take 1 capsule (30 mg) by mouth daily (Patient not taking: Reported on 10/13/2021)     coenzyme Q-10 200 MG CAPS Take 1 tablet by mouth daily     fluticasone-salmeterol (ADVAIR DISKUS) 100-50 MCG/DOSE inhaler Inhale 1 puff into the lungs every 12 hours     hydrochlorothiazide (HYDRODIURIL) 12.5 MG tablet Take 2 tablets (25 mg) by mouth daily (Patient not taking: Reported on 10/13/2021)     ibuprofen (ADVIL/MOTRIN) 400 MG tablet Take 400 mg by mouth every 6 hours as needed for moderate pain     levothyroxine (SYNTHROID/LEVOTHROID) 50 MCG tablet Take 1 tablet (50 mcg) by mouth daily     Lidocaine (LIDOCARE) 4 % Patch Place 1 patch onto the skin every 24 hours To prevent lidocaine toxicity, patient should be patch free for 12 hrs daily.     multivitamin (CENTRUM SILVER) tablet Take 1 tablet by mouth daily     ORDER FOR DME Equipment being ordered: Cane     oxyCODONE (ROXICODONE) 5 MG tablet Take 2.5 mg by mouth every 4 hours as needed for severe pain     potassium chloride ER (K-TAB/KLOR-CON) 10 MEQ CR tablet Take 1 tablet  "(10 mEq) by mouth 2 times daily     simvastatin (ZOCOR) 20 MG tablet Take 1 tablet (20 mg) by mouth daily     No current facility-administered medications for this visit.     ALLERGIES:   Allergies   Allergen Reactions     Atorvastatin      Other reaction(s): Muscle Cramps  Muscle aches     Docosahexaenoic Acid (Dha)      Fosamax      Niaspan [Niacin]      Omega-3-Acid Ethyl Esters      Zyrtec-D      DIET: Regular, regular texture, thin liquids.  Mighty Shake nutritional supplement.    Vitals:    04/18/22 1411   BP: 127/68   Pulse: 80   Resp: 18   Temp: 98  F (36.7  C)   SpO2: 98%   Weight: 56.1 kg (123 lb 9.6 oz)   Height: 1.499 m (4' 11\")     Body mass index is 24.96 kg/m .    EXAMINATION:   General: Pleasant, alert, and conversant elderly female, resting in bed, in no apparent distress.  It is extremely hot in the room.  The heat is turned all the way up as usual.  Head: Normocephalic and atraumatic.   Eyes: PERRLA, sclerae clear.   ENT: Moist oral mucosa.  She has her own teeth.  No rhinorrhea or nasal discharge.  Hearing adequate for conversation in a quiet room.  Cardiovascular: Regular rate and rhythm with no appreciable murmur.   Respiratory: Lungs clear to auscultation bilaterally.   Abdomen: Nondistended.   Musculoskeletal/Extremities: Age-related DJD.  Bilateral hallux valgus deformities, right greater than left with great toe crossover.  No peripheral edema.  Integument: No rashes, clinically significant lesions, or skin breakdown.  There are, however, fungal toenails.  Cognitive/Psychiatric: Appears alert and oriented, although there are at least minor cognitive deficits.  Affect is euthymic.    DIAGNOSTICS:   No results found for this or any previous visit (from the past 240 hour(s)).   Last Comprehensive Metabolic Panel:  Sodium   Date Value Ref Range Status   10/09/2021 138 136 - 145 mmol/L Final   12/14/2020 132.2 (L) 132.6 - 141.4 mmol/L Final     Potassium   Date Value Ref Range Status "   10/09/2021 4.0 3.5 - 5.0 mmol/L Final   12/14/2020 3.2 (L) 3.3 - 4.5 mmol/L Final     Chloride   Date Value Ref Range Status   10/09/2021 104 98 - 107 mmol/L Final   12/14/2020 97.4 (L) 98.0 - 110.0 mmol/L Final     Carbon Dioxide   Date Value Ref Range Status   12/14/2020 29.8 20.0 - 32.0 mmol/L Final     Carbon Dioxide (CO2)   Date Value Ref Range Status   10/09/2021 24 22 - 31 mmol/L Final     Anion Gap   Date Value Ref Range Status   10/09/2021 10 5 - 18 mmol/L Final   12/15/2014 4 3 - 14 mmol/L Final     Glucose   Date Value Ref Range Status   10/09/2021 94 70 - 125 mg/dL Final   12/14/2020 92.4 70.0 - 99.0 mg'dL Final     Urea Nitrogen   Date Value Ref Range Status   10/09/2021 33 (H) 8 - 28 mg/dL Final   12/14/2020 16.4 7.0 - 19.0 mg/dL Final     Creatinine   Date Value Ref Range Status   10/09/2021 0.81 0.60 - 1.10 mg/dL Final   12/14/2020 0.9 0.5 - 1.0 mg/dL Final     GFR Estimate   Date Value Ref Range Status   10/09/2021 65 >60 mL/min/1.73m2 Final     Comment:     As of July 11, 2021, eGFR is calculated by the CKD-EPI creatinine equation, without race adjustment. eGFR can be influenced by muscle mass, exercise, and diet. The reported eGFR is an estimation only and is only applicable if the renal function is stable.   12/14/2020 67.0 >60.0 mL/min/1.7 m2 Final     Calcium   Date Value Ref Range Status   10/09/2021 9.3 8.5 - 10.5 mg/dL Final   12/14/2020 9.7 8.5 - 10.1 mg/dL Final       ASSESSMENT/Plan:      ICD-10-CM    1. Frailty  R54    2. Mild cognitive impairment  G31.84    3. Hereditary and idiopathic peripheral neuropathy  G60.9    4. Essential hypertension, benign goal <150  I10    5. Moderate persistent asthma, unspecified whether complicated  J45.40    6. Acquired hypothyroidism  E03.9    7. Dyslipidemia  E78.5        DISCHARGE FACE TO FACE:  I certify that this patient is under my care and that I had a face-to-face encounter that meets the physician face-to-face encounter requirements with this  patient.     Date of Face-to-Face Encounter: 04/18/2022     I certify that, based on my findings, the following services are medically necessary home health services:  Home PT, and home OT    My clinical findings support the need for the above skilled services because: (Please write a brief narrative summary that describes what the RN, PT, SLP, or other services will be doing in the home. A list of diagnoses in this section does not meet the CMS requirements):  Home PT for strengthening, balance, endurance, and safety with mobility/ambulation; home OT for strengthening, ADL needs, adaptive equipment, and safety.    This patient is homebound because: (Please write a brief narrative summmary describing the functional limitations as to why this patient is homebound and specifically what makes this patient homebound.):  Above services necessarily need to be performed in the home to be of benefit.    The patient is, or has been, under my care and I have initiated the establishment of the plan of care. This patient will be followed by a physician who will periodically review the plan of care.  Initial follow-up should be within 7-10 days.    Approximate time spent with this patient was greater than 30 minutes with greater than 50% spent in discussions regarding services required upon discharge.    The above has been created using voice recognition software. Please be aware that this may unintentionally  produce inaccuracies and/or nonsensical sentences.      Electronically signed by: ALLIE Freeman CNP        Sincerely,        ALLIE Freeman CNP

## 2022-04-23 NOTE — PROGRESS NOTES
[facility]    Name:   Brooke Xie  :   1933  MRN:    [id number]     Facility:   [facility] () [[id number]]   Room: LTC / [address]  Code Status: DNR and POLST AVAILABLE -     DOS: 2022  Previous visit: 2022    PCP:  Martin Walton    CHIEF COMPLAINT / REASON FOR VISIT:  Chief Complaint   Patient presents with     Discharge Summary Nursing Home      [facility] from 10/05/2021 until 10/06/2021 (lower extremity weakness, multiple fractures of right ribs)      HPI: Brooke is a 89 year old female with history of essential hypertension, dyslipidemia, hypothyroidism, and asthma who was admitted on 10/05/2021 following presentation to [facility] ED for evaluation of fall at home.     She awoke on the morning of admission to use the bathroom when her walker tipped over causing her to fall backwards, hitting her right mid-back on some furniture on the way down. Endorsed pain at the site, denied pain elsewhere. No head injury or LOC.  He did report that she had had several falls in week prior.      In the ED, she was hypertensive to 200/116, otherwise vitally stable. Labs notable for K 2.6, PCO2 52. Negative troponin and creatinine WNL. EKG sinus rhythm with premature atrial complexes; no acute changes. XR of right ribs demonstrated multiple fractures. CT chest with displaced fractures involving the right 7th, 8th, and 9th posterior ribs, undisplaced fracture of right 6th posterior rib. Cervical spine and Head CT with no acute changes. Patient given KCl and Tylenol and admitted for further management.     On admission, purewick placed due to incontinence (only utilized in the hospital).  Surgery consulted saw no indication for further intervention. Given lidocaine patch for upper right back. 1 dose labetalol given for elevated blood pressure with improvement to 178/84.  Hydrochlorothiazide stopped and amlodipine added at the time of discharge which  "should help hypokalemia.  She symptomatically improved and was subsequently discharged to to the TCU in stable condition on 10/06/2021.       CURRENT/RECENT TCU ISSUES    Disposition:  My initial time spent with her seemed to suggest that she was alert and oriented; however I did receive a request for SLP to evaluate for cognition, and cognitive deficits became apparent.  At my first visit, she asked, \"And who are you?\"  She also did not adjust the bed before sitting on the edge to have breakfast, and she was tilting precariously while eating breakfast. She has a bit of difficulty with the correct year and, in fact, she believed she was only 80 at first. She did tell me, after discovering she was 88, that she would be happy to live to 89.  Today, the patient is found lying in bed watching television (as I found her last time).  It is ungodly hot in the room.  She asks who I am.  She no longer has the pain, and her appetite has improved.  She is having regular bowel movements.    Lives in an apartment with mostly elderly although not specifically for seniors.  There is an elevator. Says there is a neighbor who is helpful.  When seen earlier, she told me she would like to go home to her .  Her , she says, is 72 years old and blind. Currently, ambulatory with a 2-wheeled walker, transfer belt, and CGA.    Discharge planning: She is quite anxious to leave.  \"I am going to leave as soon as possible,\" she says.  She says that she will be moving to assisted living arrangements.    ROS:   She denies any pain. No headaches, nausea or vomiting, dizziness or dyspnea, dysuria, constipation or diarrhea, difficulty chewing or swallowing, integumentary issues, or problems with appetite or sleep.      Past Medical History:   Diagnosis Date     Asthma      CRI (chronic renal insufficiency), stage 3 (moderate) (H) 2/12/2020     HLD (hyperlipidemia)      HTN (hypertension)      Hypokalemia      Hypothyroidism      " Multiple fractures of ribs of right side (6 through 9) with routine healing 10/14/2021     UTI (urinary tract infection)      Weakness of both lower extremities               Family History   Problem Relation Age of Onset     Cancer - colorectal Father          age 94     C.A.D. Father          age 94     Melanoma Mother      Breast Cancer Sister          age 54     Cancer Father         colorectal      Coronary Artery Disease Father      Breast Cancer Sister      Social History     Socioeconomic History     Marital status:      Spouse name: None     Number of children: None     Years of education: None     Highest education level: None   Occupational History     None   Tobacco Use     Smoking status: Never Smoker     Smokeless tobacco: Never Used   Substance and Sexual Activity     Alcohol use: No     Drug use: No     Sexual activity: None   Other Topics Concern     Parent/sibling w/ CABG, MI or angioplasty before 65F 55M? Not Asked   Social History Narrative     None     Social Determinants of Health     Financial Resource Strain:      Difficulty of Paying Living Expenses:    Food Insecurity:      Worried About Running Out of Food in the Last Year:      Ran Out of Food in the Last Year:    Transportation Needs:      Lack of Transportation (Medical):      Lack of Transportation (Non-Medical):    Physical Activity:      Days of Exercise per Week:      Minutes of Exercise per Session:    Stress:      Feeling of Stress :    Social Connections:      Frequency of Communication with Friends and Family:      Frequency of Social Gatherings with Friends and Family:      Attends Restoration[employer] Services:      Active Member of Clubs or Organizations:      Attends Club or Organization Meetings:      Marital Status:    Intimate Partner Violence:      Fear of Current or Ex-Partner:      Emotionally Abused:      Physically Abused:      Sexually Abused:        MEDICATIONS: Reviewed from the MAR, physician orders,  and/or earlier progress notes.  Post Discharge Medication Reconciliation Status: medication reconcilation previously completed during another office visit.    Current Outpatient Medications   Medication Sig     acetaminophen (TYLENOL) 500 MG tablet Take 1,000 mg by mouth every 6 hours as needed     albuterol (VENTOLIN HFA) 108 (90 Base) MCG/ACT inhaler Inhale 2 puffs into the lungs 4 times daily as needed     amLODIPine (NORVASC) 5 MG tablet Take 5 mg by mouth daily     calcium citrate 250 MG TABS Take 1 tablet by mouth daily     Calcium-Vitamin D (CALTRATE 600 PLUS-VIT D OR) Take 1 tablet by mouth daily. (Patient not taking: Reported on 10/13/2021)     cholecalciferol 25 MCG (1000 UT) TABS Take 1 tablet by mouth daily (Patient not taking: Reported on 10/13/2021)     co-enzyme Q-10 30 MG CAPS capsule Take 1 capsule (30 mg) by mouth daily (Patient not taking: Reported on 10/13/2021)     coenzyme Q-10 200 MG CAPS Take 1 tablet by mouth daily     fluticasone-salmeterol (ADVAIR DISKUS) 100-50 MCG/DOSE inhaler Inhale 1 puff into the lungs every 12 hours     hydrochlorothiazide (HYDRODIURIL) 12.5 MG tablet Take 2 tablets (25 mg) by mouth daily (Patient not taking: Reported on 10/13/2021)     ibuprofen (ADVIL/MOTRIN) 400 MG tablet Take 400 mg by mouth every 6 hours as needed for moderate pain     levothyroxine (SYNTHROID/LEVOTHROID) 50 MCG tablet Take 1 tablet (50 mcg) by mouth daily     Lidocaine (LIDOCARE) 4 % Patch Place 1 patch onto the skin every 24 hours To prevent lidocaine toxicity, patient should be patch free for 12 hrs daily.     multivitamin (CENTRUM SILVER) tablet Take 1 tablet by mouth daily     ORDER FOR DME Equipment being ordered: Cane     oxyCODONE (ROXICODONE) 5 MG tablet Take 2.5 mg by mouth every 4 hours as needed for severe pain     potassium chloride ER (K-TAB/KLOR-CON) 10 MEQ CR tablet Take 1 tablet (10 mEq) by mouth 2 times daily     simvastatin (ZOCOR) 20 MG tablet Take 1 tablet (20 mg) by mouth  "daily     No current facility-administered medications for this visit.     ALLERGIES:   Allergies   Allergen Reactions     Atorvastatin      Other reaction(s): Muscle Cramps  Muscle aches     Docosahexaenoic Acid (Dha)      Fosamax      Niaspan [Niacin]      Omega-3-Acid Ethyl Esters      Zyrtec-D      DIET: Regular, regular texture, thin liquids.  Mighty Shake nutritional supplement.    Vitals:    04/18/22 1411   BP: 127/68   Pulse: 80   Resp: 18   Temp: 98  F (36.7  C)   SpO2: 98%   Weight: 56.1 kg (123 lb 9.6 oz)   Height: 1.499 m (4' 11\")     Body mass index is 24.96 kg/m .    EXAMINATION:   General: Pleasant, alert, and conversant elderly female, resting in bed, in no apparent distress.  It is extremely hot in the room.  The heat is turned all the way up as usual.  Head: Normocephalic and atraumatic.   Eyes: PERRLA, sclerae clear.   ENT: Moist oral mucosa.  She has her own teeth.  No rhinorrhea or nasal discharge.  Hearing adequate for conversation in a quiet room.  Cardiovascular: Regular rate and rhythm with no appreciable murmur.   Respiratory: Lungs clear to auscultation bilaterally.   Abdomen: Nondistended.   Musculoskeletal/Extremities: Age-related DJD.  Bilateral hallux valgus deformities, right greater than left with great toe crossover.  No peripheral edema.  Integument: No rashes, clinically significant lesions, or skin breakdown.  There are, however, fungal toenails.  Cognitive/Psychiatric: Appears alert and oriented, although there are at least minor cognitive deficits.  Affect is euthymic.    DIAGNOSTICS:   No results found for this or any previous visit (from the past 240 hour(s)).   Last Comprehensive Metabolic Panel:  Sodium   Date Value Ref Range Status   10/09/2021 138 136 - 145 mmol/L Final   12/14/2020 132.2 (L) 132.6 - 141.4 mmol/L Final     Potassium   Date Value Ref Range Status   10/09/2021 4.0 3.5 - 5.0 mmol/L Final   12/14/2020 3.2 (L) 3.3 - 4.5 mmol/L Final     Chloride   Date Value " Ref Range Status   10/09/2021 104 98 - 107 mmol/L Final   12/14/2020 97.4 (L) 98.0 - 110.0 mmol/L Final     Carbon Dioxide   Date Value Ref Range Status   12/14/2020 29.8 20.0 - 32.0 mmol/L Final     Carbon Dioxide (CO2)   Date Value Ref Range Status   10/09/2021 24 22 - 31 mmol/L Final     Anion Gap   Date Value Ref Range Status   10/09/2021 10 5 - 18 mmol/L Final   12/15/2014 4 3 - 14 mmol/L Final     Glucose   Date Value Ref Range Status   10/09/2021 94 70 - 125 mg/dL Final   12/14/2020 92.4 70.0 - 99.0 mg'dL Final     Urea Nitrogen   Date Value Ref Range Status   10/09/2021 33 (H) 8 - 28 mg/dL Final   12/14/2020 16.4 7.0 - 19.0 mg/dL Final     Creatinine   Date Value Ref Range Status   10/09/2021 0.81 0.60 - 1.10 mg/dL Final   12/14/2020 0.9 0.5 - 1.0 mg/dL Final     GFR Estimate   Date Value Ref Range Status   10/09/2021 65 >60 mL/min/1.73m2 Final     Comment:     As of July 11, 2021, eGFR is calculated by the CKD-EPI creatinine equation, without race adjustment. eGFR can be influenced by muscle mass, exercise, and diet. The reported eGFR is an estimation only and is only applicable if the renal function is stable.   12/14/2020 67.0 >60.0 mL/min/1.7 m2 Final     Calcium   Date Value Ref Range Status   10/09/2021 9.3 8.5 - 10.5 mg/dL Final   12/14/2020 9.7 8.5 - 10.1 mg/dL Final       ASSESSMENT/Plan:      ICD-10-CM    1. Frailty  R54    2. Mild cognitive impairment  G31.84    3. Hereditary and idiopathic peripheral neuropathy  G60.9    4. Essential hypertension, benign goal <150  I10    5. Moderate persistent asthma, unspecified whether complicated  J45.40    6. Acquired hypothyroidism  E03.9    7. Dyslipidemia  E78.5        DISCHARGE FACE TO FACE:  I certify that this patient is under my care and that I had a face-to-face encounter that meets the physician face-to-face encounter requirements with this patient.     Date of Face-to-Face Encounter: 04/18/2022     I certify that, based on my findings, the  following services are medically necessary home health services:  Home PT, and home OT    My clinical findings support the need for the above skilled services because: (Please write a brief narrative summary that describes what the RN, PT, SLP, or other services will be doing in the home. A list of diagnoses in this section does not meet the CMS requirements):  Home PT for strengthening, balance, endurance, and safety with mobility/ambulation; home OT for strengthening, ADL needs, adaptive equipment, and safety.    This patient is homebound because: (Please write a brief narrative summmary describing the functional limitations as to why this patient is homebound and specifically what makes this patient homebound.):  Above services necessarily need to be performed in the home to be of benefit.    The patient is, or has been, under my care and I have initiated the establishment of the plan of care. This patient will be followed by a physician who will periodically review the plan of care.  Initial follow-up should be within 7-10 days.    Approximate time spent with this patient was greater than 30 minutes with greater than 50% spent in discussions regarding services required upon discharge.    The above has been created using voice recognition software. Please be aware that this may unintentionally  produce inaccuracies and/or nonsensical sentences.      Electronically signed by: ALLIE Freeman CNP

## 2022-05-15 NOTE — PROGRESS NOTES
[facility]    Name:   Brooke Xie  :   1933  MRN:    [id number]     Facility:   [facility] () [[id number]]   Room: LTC / [address]  Code Status: DNR and POLST AVAILABLE -     DOS: 2022  Previous visit: 2021 and subsequently seen by Dr. Pereira on 2022.    PCP:  Martin Walton    CHIEF COMPLAINT / REASON FOR VISIT:  Chief Complaint   Patient presents with     P Care Coordination - Home Visit-Annual Assessment     Chronic problems include hypertension, asthma, CKD stage II, generalized weakness.      [facility] from 10/05/2021 until 10/06/2021 (lower extremity weakness, multiple fractures of right ribs)      HPI: Brooke is a 89 year old female with history of essential hypertension, dyslipidemia, hypothyroidism, and asthma who was admitted on 10/05/2021 following presentation to [facility] ED for evaluation of fall at home.     She awoke on the morning of admission to use the bathroom when her walker tipped over causing her to fall backwards, hitting her right mid-back on some furniture on the way down. Endorsed pain at the site, denied pain elsewhere. No head injury or LOC.  He did report that she had had several falls in week prior.      In the ED, she was hypertensive to 200/116, otherwise vitally stable. Labs notable for K 2.6, PCO2 52. Negative troponin and creatinine WNL. EKG sinus rhythm with premature atrial complexes; no acute changes. XR of right ribs demonstrated multiple fractures. CT chest with displaced fractures involving the right 7th, 8th, and 9th posterior ribs, undisplaced fracture of right 6th posterior rib. Cervical spine and Head CT with no acute changes. Patient given KCl and Tylenol and admitted for further management.     On admission, purewick placed due to incontinence (only utilized in the hospital).  Surgery consulted saw no indication for further intervention. Given lidocaine patch for upper right back. 1  "dose labetalol given for elevated blood pressure with improvement to 178/84.  Hydrochlorothiazide stopped and amlodipine added at the time of discharge which should help hypokalemia.  She symptomatically improved and was subsequently discharged to to the TCU in stable condition on 10/06/2021.       CURRENT/RECENT TCU ISSUES    Disposition:  My initial time spent with her seemed to suggest that she was alert and oriented; however I did receive a request for SLP to evaluate for cognition, and cognitive deficits became apparent.  At my first visit, she asked, \"And who are you?\"  She also did not adjust the bed before sitting on the edge to have breakfast, and she was tilting precariously while eating breakfast. She had a bit of difficulty with the correct year and, in fact, she believed she was only 80 at first. She did tell me, after discovering she was 88, that she would be happy to live to 89.  TODAY, as usual, the patient is found lying in bed watching television.  She denies any pain.  It is very hot in the room, as she likes to keep the thermostat all the way up..  She asks who I am.  She no longer has the pain, and her appetite has improved.  She is having regular bowel movements.    Today, she asks, \"who are you?\"  After I inform her, she tells me, \"well, I'm going to be getting out of here.\"  When?  \"Sometime at the end of the month.  We got a place.\"  Apparently, it is an assisted living where she will be able to stay with her .  Even with his lack of vision, she thought her  to do laundry and cook meals, using \"little bump dots.\"  She tells me her first  .  He was a \"CPA, a certified pain in the ass.\"  He was also an alcoholic.    Lives in an apartment with mostly elderly although not specifically for seniors.  There is an elevator. Says there is a neighbor who is helpful.  When seen earlier, she told me she would like to go home to her .  Her , she says, is 72 years " old and blind. Currently, ambulatory with a 2-wheeled walker, transfer belt, and CGA.    Discharge planning: She has spent quite some time in the TCU and now long-term care.  She is quite anxious to leave.     ROS:   She denies any pain. No headaches, nausea or vomiting, dizziness or dyspnea, dysuria, constipation or diarrhea, difficulty chewing or swallowing, integumentary issues, or problems with appetite or sleep.      Past Medical History:   Diagnosis Date     Asthma      CRI (chronic renal insufficiency), stage 3 (moderate) (H) 2020     HLD (hyperlipidemia)      HTN (hypertension)      Hypokalemia      Hypothyroidism      Multiple fractures of ribs of right side (6 through 9) with routine healing 10/14/2021     UTI (urinary tract infection)      Weakness of both lower extremities               Family History   Problem Relation Age of Onset     Cancer - colorectal Father          age 94     C.A.D. Father          age 94     Melanoma Mother      Breast Cancer Sister          age 54     Cancer Father         colorectal      Coronary Artery Disease Father      Breast Cancer Sister      Social History     Socioeconomic History     Marital status:      Spouse name: None     Number of children: None     Years of education: None     Highest education level: None   Occupational History     None   Tobacco Use     Smoking status: Never Smoker     Smokeless tobacco: Never Used   Substance and Sexual Activity     Alcohol use: No     Drug use: No     Sexual activity: None   Other Topics Concern     Parent/sibling w/ CABG, MI or angioplasty before 65F 55M? Not Asked   Social History Narrative     None     Social Determinants of Health     Financial Resource Strain:      Difficulty of Paying Living Expenses:    Food Insecurity:      Worried About Running Out of Food in the Last Year:      Ran Out of Food in the Last Year:    Transportation Needs:      Lack of Transportation (Medical):      Lack of  Transportation (Non-Medical):    Physical Activity:      Days of Exercise per Week:      Minutes of Exercise per Session:    Stress:      Feeling of Stress :    Social Connections:      Frequency of Communication with Friends and Family:      Frequency of Social Gatherings with Friends and Family:      Attends Mandaen Services:      Active Member of Clubs or Organizations:      Attends Club or Organization Meetings:      Marital Status:    Intimate Partner Violence:      Fear of Current or Ex-Partner:      Emotionally Abused:      Physically Abused:      Sexually Abused:        MEDICATIONS: Reviewed from the MAR, physician orders, and/or earlier progress notes.  Post Discharge Medication Reconciliation Status: medication reconcilation previously completed during another office visit.    Current Outpatient Medications   Medication Sig     acetaminophen (TYLENOL) 500 MG tablet Take 1,000 mg by mouth every 6 hours as needed     albuterol (VENTOLIN HFA) 108 (90 Base) MCG/ACT inhaler Inhale 2 puffs into the lungs 4 times daily as needed     amLODIPine (NORVASC) 5 MG tablet Take 5 mg by mouth daily     calcium citrate 250 MG TABS Take 1 tablet by mouth daily     Calcium-Vitamin D (CALTRATE 600 PLUS-VIT D OR) Take 1 tablet by mouth daily. (Patient not taking: Reported on 10/13/2021)     cholecalciferol 25 MCG (1000 UT) TABS Take 1 tablet by mouth daily (Patient not taking: Reported on 10/13/2021)     co-enzyme Q-10 30 MG CAPS capsule Take 1 capsule (30 mg) by mouth daily (Patient not taking: Reported on 10/13/2021)     coenzyme Q-10 200 MG CAPS Take 1 tablet by mouth daily     fluticasone-salmeterol (ADVAIR DISKUS) 100-50 MCG/DOSE inhaler Inhale 1 puff into the lungs every 12 hours     hydrochlorothiazide (HYDRODIURIL) 12.5 MG tablet Take 2 tablets (25 mg) by mouth daily (Patient not taking: Reported on 10/13/2021)     ibuprofen (ADVIL/MOTRIN) 400 MG tablet Take 400 mg by mouth every 6 hours as needed for moderate pain  "    levothyroxine (SYNTHROID/LEVOTHROID) 50 MCG tablet Take 1 tablet (50 mcg) by mouth daily     Lidocaine (LIDOCARE) 4 % Patch Place 1 patch onto the skin every 24 hours To prevent lidocaine toxicity, patient should be patch free for 12 hrs daily.     multivitamin (CENTRUM SILVER) tablet Take 1 tablet by mouth daily     ORDER FOR DME Equipment being ordered: Cane     oxyCODONE (ROXICODONE) 5 MG tablet Take 2.5 mg by mouth every 4 hours as needed for severe pain     potassium chloride ER (K-TAB/KLOR-CON) 10 MEQ CR tablet Take 1 tablet (10 mEq) by mouth 2 times daily     simvastatin (ZOCOR) 20 MG tablet Take 1 tablet (20 mg) by mouth daily     No current facility-administered medications for this visit.     ALLERGIES:   Allergies   Allergen Reactions     Atorvastatin      Other reaction(s): Muscle Cramps  Muscle aches     Docosahexaenoic Acid (Dha)      Fosamax      Niaspan [Niacin]      Omega-3-Acid Ethyl Esters      Zyrtec-D      DIET: Regular, regular texture, thin liquids.  Mighty Shake nutritional supplement.    Vitals:    04/11/22 2109   BP: 130/86   Pulse: 77   Resp: 20   Temp: 98.4  F (36.9  C)   SpO2: 97%   Weight: 56.6 kg (124 lb 12.8 oz)   Height: 1.499 m (4' 11\")     Body mass index is 25.21 kg/m .    EXAMINATION:   General: Pleasant, alert, and conversant elderly female, resting in bed, in no apparent distress.  The TV is not on.  She says maintenance needs to come fix it.  It is extremely hot in the room.  The heat is turned all the way up.  Head: Normocephalic and atraumatic.   Eyes: PERRLA, sclerae clear.   ENT: Moist oral mucosa.  She has her own teeth.  No rhinorrhea or nasal discharge.  Hearing is adequate for conversation in a quiet room.  Cardiovascular: Regular rate and rhythm without appreciable murmur.   Respiratory: Lungs clear to auscultation bilaterally.   Abdomen: Nondistended.   Musculoskeletal/Extremities: Age-related degenerative joint disease.  Bilateral hallux valgus deformities, " right greater than left with great toe crossover.  No peripheral edema.  Integument: No rashes, clinically significant lesions, or skin breakdown.  There are, however, fungal toenails.  Cognitive/Psychiatric: Appears alert and oriented, although there are at least minor cognitive deficits.  Affect is euthymic.    DIAGNOSTICS:   No results found for this or any previous visit (from the past 240 hour(s)).   Last Comprehensive Metabolic Panel:  Sodium   Date Value Ref Range Status   10/09/2021 138 136 - 145 mmol/L Final   12/14/2020 132.2 (L) 132.6 - 141.4 mmol/L Final     Potassium   Date Value Ref Range Status   10/09/2021 4.0 3.5 - 5.0 mmol/L Final   12/14/2020 3.2 (L) 3.3 - 4.5 mmol/L Final     Chloride   Date Value Ref Range Status   10/09/2021 104 98 - 107 mmol/L Final   12/14/2020 97.4 (L) 98.0 - 110.0 mmol/L Final     Carbon Dioxide   Date Value Ref Range Status   12/14/2020 29.8 20.0 - 32.0 mmol/L Final     Carbon Dioxide (CO2)   Date Value Ref Range Status   10/09/2021 24 22 - 31 mmol/L Final     Anion Gap   Date Value Ref Range Status   10/09/2021 10 5 - 18 mmol/L Final   12/15/2014 4 3 - 14 mmol/L Final     Glucose   Date Value Ref Range Status   10/09/2021 94 70 - 125 mg/dL Final   12/14/2020 92.4 70.0 - 99.0 mg'dL Final     Urea Nitrogen   Date Value Ref Range Status   10/09/2021 33 (H) 8 - 28 mg/dL Final   12/14/2020 16.4 7.0 - 19.0 mg/dL Final     Creatinine   Date Value Ref Range Status   10/09/2021 0.81 0.60 - 1.10 mg/dL Final   12/14/2020 0.9 0.5 - 1.0 mg/dL Final     GFR Estimate   Date Value Ref Range Status   10/09/2021 65 >60 mL/min/1.73m2 Final     Comment:     As of July 11, 2021, eGFR is calculated by the CKD-EPI creatinine equation, without race adjustment. eGFR can be influenced by muscle mass, exercise, and diet. The reported eGFR is an estimation only and is only applicable if the renal function is stable.   12/14/2020 67.0 >60.0 mL/min/1.7 m2 Final     Calcium   Date Value Ref Range  Status   10/09/2021 9.3 8.5 - 10.5 mg/dL Final   12/14/2020 9.7 8.5 - 10.1 mg/dL Final       ASSESSMENT/Plan:      ICD-10-CM    1. Frailty  R54    2. Moderate persistent asthma, unspecified whether complicated  J45.40    3. Essential hypertension, benign goal <150  I10    4. Mild cognitive impairment  G31.84    5. Acquired hypothyroidism  E03.9    6. Hereditary and idiopathic peripheral neuropathy  G60.9    7. Dyslipidemia  E78.5        CASE MANAGEMENT:  I have reviewed the facility/SNF care plan/MDS which was done recently, including the falls risk, nutrition and pain screening. I also reviewed the current immunizations, and preventive care..Future cancer screening is not clinically indicated secondary to age/goals of care Patient's desire to return to the community is present and will be discharging to assisted living. Current Level of Care is appropriate.    Advance Directive Discussion:    I reviewed the current advanced directives as reflected in EPIC, the POLST and the facility chart, and verified the congruency of orders. I did review the advance directives with the resident.  While she would not mind living to 100, she is DNR.    Team Discussion:  I communicated with the appropriate disciplines involved with the Plan of Care:   Nursing      Patient Goal:  Patient's goal is pain control and comfort.    Information reviewed:  Medications, vital signs, orders, and nursing notes.    CHANGES:    None.    CARE PLAN:  The care plan, medications, vital signs, orders, and nursing notes have been reviewed, and orders signed.  Changes to care plan, if any, as noted.  Otherwise, continue current plan of care.    The above has been created using voice recognition software. Please be aware that this may unintentionally  produce inaccuracies and/or nonsensical sentences.      Electronically signed by: ALLIE Freeman CNP

## 2022-06-21 NOTE — TELEPHONE ENCOUNTER
"Request for medication refill:  potassium chloride ER (K-TAB/KLOR-CON) 10 MEQ CR tablet  Providers if patient needs an appointment and you are willing to give a one month supply please refill for one month and  send a letter/MyChart using \".SMILLIMITEDREFILL\" .smillimited and route chart to \"P SMI \" (Giving one month refill in non controlled medications is strongly recommended before denial)    If refill has been denied, meaning absolutely no refills without visit, please complete the smart phrase \".smirxrefuse\" and route it to the \"P SMI MED REFILLS\"  pool to inform the patient and the pharmacy.    Brent Shahid MA        "

## 2022-10-14 NOTE — TELEPHONE ENCOUNTER

## 2023-01-01 ENCOUNTER — LAB REQUISITION (OUTPATIENT)
Dept: LAB | Facility: CLINIC | Age: 88
End: 2023-01-01
Payer: OTHER MISCELLANEOUS

## 2023-01-01 ENCOUNTER — APPOINTMENT (OUTPATIENT)
Dept: RADIOLOGY | Facility: HOSPITAL | Age: 88
End: 2023-01-01
Attending: EMERGENCY MEDICINE
Payer: COMMERCIAL

## 2023-01-01 ENCOUNTER — APPOINTMENT (OUTPATIENT)
Dept: CT IMAGING | Facility: HOSPITAL | Age: 88
End: 2023-01-01
Attending: EMERGENCY MEDICINE
Payer: COMMERCIAL

## 2023-01-01 ENCOUNTER — HOSPITAL ENCOUNTER (EMERGENCY)
Facility: HOSPITAL | Age: 88
Discharge: HOME | End: 2023-01-30
Attending: EMERGENCY MEDICINE | Admitting: EMERGENCY MEDICINE
Payer: COMMERCIAL

## 2023-01-01 VITALS
HEART RATE: 74 BPM | TEMPERATURE: 98.7 F | WEIGHT: 124 LBS | SYSTOLIC BLOOD PRESSURE: 126 MMHG | DIASTOLIC BLOOD PRESSURE: 60 MMHG | RESPIRATION RATE: 16 BRPM | BODY MASS INDEX: 23.41 KG/M2 | OXYGEN SATURATION: 97 % | HEIGHT: 61 IN

## 2023-01-01 DIAGNOSIS — T50.2X5A DIURETIC-INDUCED HYPOKALEMIA: ICD-10-CM

## 2023-01-01 DIAGNOSIS — S01.81XA LACERATION OF FOREHEAD, INITIAL ENCOUNTER: ICD-10-CM

## 2023-01-01 DIAGNOSIS — G89.29 CHRONIC RIGHT SHOULDER PAIN: ICD-10-CM

## 2023-01-01 DIAGNOSIS — M25.511 CHRONIC RIGHT SHOULDER PAIN: ICD-10-CM

## 2023-01-01 DIAGNOSIS — E03.8 OTHER SPECIFIED HYPOTHYROIDISM: ICD-10-CM

## 2023-01-01 DIAGNOSIS — S00.83XA CONTUSION OF FACE, INITIAL ENCOUNTER: ICD-10-CM

## 2023-01-01 DIAGNOSIS — E87.6 DIURETIC-INDUCED HYPOKALEMIA: ICD-10-CM
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
## 2023-01-01 DIAGNOSIS — I10 ESSENTIAL (PRIMARY) HYPERTENSION: ICD-10-CM

## 2023-01-01 LAB
ANION GAP SERPL CALCULATED.3IONS-SCNC: 12 MMOL/L (ref 7–15)
ANION GAP SERPL CALCULATED.3IONS-SCNC: 13 MMOL/L (ref 7–15)
APTT PPP: 28 SECONDS (ref 22–38)
BASOPHILS # BLD AUTO: 0 10E3/UL (ref 0–0.2)
BASOPHILS NFR BLD AUTO: 0 %
BUN SERPL-MCNC: 17.4 MG/DL (ref 8–23)
BUN SERPL-MCNC: 17.5 MG/DL (ref 8–23)
CALCIUM SERPL-MCNC: 9 MG/DL (ref 8.2–9.6)
CALCIUM SERPL-MCNC: 9.1 MG/DL (ref 8.2–9.6)
CHLORIDE SERPL-SCNC: 108 MMOL/L (ref 98–107)
CHLORIDE SERPL-SCNC: 99 MMOL/L (ref 98–107)
CREAT SERPL-MCNC: 0.81 MG/DL (ref 0.51–0.95)
CREAT SERPL-MCNC: 0.88 MG/DL (ref 0.51–0.95)
DEPRECATED HCO3 PLAS-SCNC: 21 MMOL/L (ref 22–29)
DEPRECATED HCO3 PLAS-SCNC: 22 MMOL/L (ref 22–29)
EOSINOPHIL # BLD AUTO: 0 10E3/UL (ref 0–0.7)
EOSINOPHIL NFR BLD AUTO: 0 %
ERYTHROCYTE [DISTWIDTH] IN BLOOD BY AUTOMATED COUNT: 13.2 % (ref 10–15)
ERYTHROCYTE [DISTWIDTH] IN BLOOD BY AUTOMATED COUNT: 14.2 % (ref 10–15)
GFR SERPL CREATININE-BSD FRML MDRD: 62 ML/MIN/1.73M2
GFR SERPL CREATININE-BSD FRML MDRD: 69 ML/MIN/1.73M2
GLUCOSE SERPL-MCNC: 83 MG/DL (ref 70–99)
GLUCOSE SERPL-MCNC: 92 MG/DL (ref 70–99)
HCT VFR BLD AUTO: 36.8 % (ref 35–47)
HCT VFR BLD AUTO: 43 % (ref 35–47)
HGB BLD-MCNC: 12.1 G/DL (ref 11.7–15.7)
HGB BLD-MCNC: 14.5 G/DL (ref 11.7–15.7)
HOLD SPECIMEN: NORMAL
IMM GRANULOCYTES # BLD: 0 10E3/UL
IMM GRANULOCYTES NFR BLD: 0 %
INR PPP: 1.15 (ref 0.85–1.15)
LYMPHOCYTES # BLD AUTO: 0.9 10E3/UL (ref 0.8–5.3)
LYMPHOCYTES NFR BLD AUTO: 10 %
MCH RBC QN AUTO: 31 PG (ref 26.5–33)
MCH RBC QN AUTO: 31.3 PG (ref 26.5–33)
MCHC RBC AUTO-ENTMCNC: 32.9 G/DL (ref 31.5–36.5)
MCHC RBC AUTO-ENTMCNC: 33.7 G/DL (ref 31.5–36.5)
MCV RBC AUTO: 92 FL (ref 78–100)
MCV RBC AUTO: 95 FL (ref 78–100)
MONOCYTES # BLD AUTO: 1 10E3/UL (ref 0–1.3)
MONOCYTES NFR BLD AUTO: 11 %
NEUTROPHILS # BLD AUTO: 6.8 10E3/UL (ref 1.6–8.3)
NEUTROPHILS NFR BLD AUTO: 79 %
NRBC # BLD AUTO: 0 10E3/UL
NRBC BLD AUTO-RTO: 0 /100
PLATELET # BLD AUTO: 186 10E3/UL (ref 150–450)
PLATELET # BLD AUTO: 203 10E3/UL (ref 150–450)
POTASSIUM SERPL-SCNC: 4 MMOL/L (ref 3.4–5.3)
POTASSIUM SERPL-SCNC: 4.5 MMOL/L (ref 3.4–5.3)
RBC # BLD AUTO: 3.86 10E6/UL (ref 3.8–5.2)
RBC # BLD AUTO: 4.67 10E6/UL (ref 3.8–5.2)
SODIUM SERPL-SCNC: 134 MMOL/L (ref 136–145)
SODIUM SERPL-SCNC: 141 MMOL/L (ref 136–145)
TSH SERPL DL<=0.005 MIU/L-ACNC: 3.13 UIU/ML (ref 0.3–4.2)
WBC # BLD AUTO: 4.9 10E3/UL (ref 4–11)
WBC # BLD AUTO: 8.7 10E3/UL (ref 4–11)

## 2023-01-01 PROCEDURE — P9603 ONE-WAY ALLOW PRORATED MILES: HCPCS | Mod: ORL

## 2023-01-01 PROCEDURE — 36415 COLL VENOUS BLD VENIPUNCTURE: CPT | Mod: ORL

## 2023-01-01 PROCEDURE — 99285 EMERGENCY DEPT VISIT HI MDM: CPT | Mod: 25

## 2023-01-01 PROCEDURE — 85610 PROTHROMBIN TIME: CPT | Performed by: EMERGENCY MEDICINE

## 2023-01-01 PROCEDURE — 70450 CT HEAD/BRAIN W/O DYE: CPT

## 2023-01-01 PROCEDURE — 250N000011 HC RX IP 250 OP 636: Performed by: EMERGENCY MEDICINE

## 2023-01-01 PROCEDURE — 80048 BASIC METABOLIC PNL TOTAL CA: CPT | Mod: ORL

## 2023-01-01 PROCEDURE — 12011 RPR F/E/E/N/L/M 2.5 CM/<: CPT

## 2023-01-01 PROCEDURE — 90471 IMMUNIZATION ADMIN: CPT | Performed by: EMERGENCY MEDICINE

## 2023-01-01 PROCEDURE — 70486 CT MAXILLOFACIAL W/O DYE: CPT

## 2023-01-01 PROCEDURE — 85730 THROMBOPLASTIN TIME PARTIAL: CPT | Performed by: EMERGENCY MEDICINE

## 2023-01-01 PROCEDURE — 72125 CT NECK SPINE W/O DYE: CPT

## 2023-01-01 PROCEDURE — 90715 TDAP VACCINE 7 YRS/> IM: CPT | Performed by: EMERGENCY MEDICINE

## 2023-01-01 PROCEDURE — 84443 ASSAY THYROID STIM HORMONE: CPT | Mod: ORL

## 2023-01-01 PROCEDURE — 36415 COLL VENOUS BLD VENIPUNCTURE: CPT | Performed by: EMERGENCY MEDICINE

## 2023-01-01 PROCEDURE — 85027 COMPLETE CBC AUTOMATED: CPT | Mod: ORL

## 2023-01-01 PROCEDURE — 80048 BASIC METABOLIC PNL TOTAL CA: CPT | Performed by: EMERGENCY MEDICINE

## 2023-01-01 PROCEDURE — 85025 COMPLETE CBC W/AUTO DIFF WBC: CPT | Performed by: EMERGENCY MEDICINE

## 2023-01-01 PROCEDURE — 73030 X-RAY EXAM OF SHOULDER: CPT | Mod: RT

## 2023-01-01 RX ORDER — POTASSIUM CHLORIDE 750 MG/1
TABLET, EXTENDED RELEASE ORAL
Qty: 180 TABLET | Refills: 3 | Status: SHIPPED | OUTPATIENT
Start: 2023-01-01

## 2023-01-01 RX ADMIN — CLOSTRIDIUM TETANI TOXOID ANTIGEN (FORMALDEHYDE INACTIVATED), CORYNEBACTERIUM DIPHTHERIAE TOXOID ANTIGEN (FORMALDEHYDE INACTIVATED), BORDETELLA PERTUSSIS TOXOID ANTIGEN (GLUTARALDEHYDE INACTIVATED), BORDETELLA PERTUSSIS FILAMENTOUS HEMAGGLUTININ ANTIGEN (FORMALDEHYDE INACTIVATED), BORDETELLA PERTUSSIS PERTACTIN ANTIGEN, AND BORDETELLA PERTUSSIS FIMBRIAE 2/3 ANTIGEN 0.5 ML: 5; 2; 2.5; 5; 3; 5 INJECTION, SUSPENSION INTRAMUSCULAR at 07:05

## 2023-01-01 ASSESSMENT — ENCOUNTER SYMPTOMS
FEVER: 0
CHILLS: 0
SHORTNESS OF BREATH: 0
WOUND: 1
ABDOMINAL PAIN: 0
COUGH: 0

## 2023-01-01 ASSESSMENT — ACTIVITIES OF DAILY LIVING (ADL)
ADLS_ACUITY_SCORE: 35

## 2023-01-30 NOTE — ED TRIAGE NOTES
Pt came by [facility]. Pt fell out of bed onto the floor trying to reach for her glasses. Fall was witnessed via camera, not on thinners. Pt denies LOC, Laceration to right eyebrow, dried bloody right side of head, unable to find laceration to right side of head on admission. Pt complains of right shoulder  Pain. Pt able to move bilateral hands and shoulders. Pt comes from [facility], w/c bound at baseline. Rates pain 1/10 on shoulder and right eyebrow.      Triage Assessment     Row Name 01/30/23 0340       Triage Assessment (Adult)    Airway WDL WDL    Additional Documentation Breath Sounds (Group)       Breath Sounds    Breath Sounds All Fields    All Lung Fields Breath Sounds Posterior:;clear       Skin Circulation/Temperature WDL    Skin Circulation/Temperature WDL WDL       Cardiac WDL    Cardiac WDL WDL       Peripheral/Neurovascular WDL    Peripheral Neurovascular WDL WDL       Cognitive/Neuro/Behavioral WDL    Cognitive/Neuro/Behavioral WDL WDL;orientation    Orientation disoriented to;time

## 2023-01-30 NOTE — DISCHARGE INSTRUCTIONS
Recommend bacitracin ointment available over-the-counter to the wound twice daily.  Keep wound dry for 24 hours then you can shower normally.  the sutures will dissolve on their own.  Recheck with your doctor this week.  Recommend virtual recheck with your primary care doctor this week.  Recommend follow-up with orthopedic doctor for your chronic right shoulder pain

## 2023-01-30 NOTE — ED NOTES
Bed: JNED-05  Expected date: 1/30/23  Expected time: 3:21 AM  Means of arrival: Ambulance  Comments:  [place]- 89yof fall, memory care, lac to back of head, no thinners, vss

## 2023-01-30 NOTE — ED PROVIDER NOTES
EMERGENCY DEPARTMENT ENCOUNTER      NAME: Brooke Xie  AGE: 89 year old female  YOB: 1933  MRN: [id number]  EVALUATION DATE & TIME: 1/30/2023  3:28 AM    PCP: No primary care provider on file.    ED PROVIDER: Roc Patel MD        Chief Complaint   Patient presents with     Fall     Shoulder Pain     Right shoulder pain          FINAL IMPRESSION:  1. Laceration of forehead, initial encounter    2. Fall, initial encounter    3. Contusion of face, initial encounter    4. Chronic right shoulder pain          ED COURSE & MEDICAL DECISION MAKING:    3:30 AM I met with patient for initial interview and encounter. PPE worn includes exam gloves, goggles, and N95 mask.   5:24 AM I rechecked on the patient. She has no tenderness over C7 and T1 area.   5:26 AM Attempted to call the patient  but phone number is not in service.   5:27 AM Attempted to call the patient care coordinator but phone number is not in service.   5:35 AM  Spoke with a [facility] staff member from the [facility] care unit. They gave me different phone number then documented. The facility does not know what medication she is taking therefore they will call back.   6:17 AM Spoke with the patient  Darin. He reports she has chronic right shoulder pain. She is also wheelchair dependent. The patient was tested positive for COVID-19 yesterday since staff at facility reports she was weak appearing.   6:33 AM Rechecked on the patient and spoke to her.  6:56 AM Spoke with Nurse from [facility] and updated workup in ED.  Informed them patient will be transfer back to [facility].     Pertinent Labs & Imaging studies reviewed. (See chart for details)  89 year old female presents to the Emergency Department for evaluation of fall out of bed from memory care unit    Is now on ambulatory at baseline and sits in a wheelchair.  Cameras detected that patient fell out of bed and sent alert to staff.  Was not on the ground very long.  Not on  anticoagulants.    Primary survey negative.  Secondary survey notable for right brow laceration, right shoulder pain, right upper lip swelling    Plan for CT head, neck, face, x-ray right shoulder    This was mechanical fall.  No chest pain.  No shortness of breath.    Tested positive COVID-19 yesterday.  Patient, family, and care facility do not know me days she has been sick for but they tested her because she has been weak the last few days.  No cough, shortness of breath, no respiratory distress.       ED Course as of 01/30/23 0728   Mon Jan 30, 2023   0457 Patient presents via EMS memory care unit.  Nonambulatory at baseline.   0458 Patient fell out of bed per cameras and has laceration to brow, dried matted blood to the right side of scalp, has some right shoulder pain.   0458 No midline C-spine tenderness or back tenderness.  Abdomen soft nontender.  No chest wall tenderness   0458 Not on blood thinners.   0458 Plan for CT head and neck, CT face due to swelling to upper lip, and chest x-ray of right shoulder   0501 X-ray right shoulder no evidence of fracture or dislocation but does have arthritic changes.  Also considered rotator cuff tear.  Plan for outpatient follow-up   0712 Tdap updated   0712 Spoke with patient's  on the phone.  She is got chronic right shoulder pain.  X-ray tonight without evidence of fracture.  Cervical radiculopathy unlikely.  Septic joint unlikely.   0712 Recommend follow-up with orthopedics   0712 CT imaging without evidence of acute fracture or intracranial hemorrhage.  CT does show likely chronic T1 endplate fracture.  Patient's not tender at all over this area.  Likely chronic   0712 Brow laceration was irrigated and closed with 3 simple interrupted sutures   0713 Plan for discharge home with bacitracin and wound care instructions.  I spoke with patient's care facility [facility] about patient coming back   0713 Patient tested positive for COVID yesterday.  Patient  "denies any COVID-19 symptoms of cough or fever or difficulty breathing.  Unclear when infection started. Not a Paxlovid candidate   0713 Vaccinated x4 against COVID   0713 Recommend virtual recheck with primary care doctor regarding positive COVID test   0714 CT head with age indeterminate lacunar infarct   0714 History and examination with no focal weakness to suggest acute stroke     Plan for discharge home with bacitracin ointment.    At the conclusion of the encounter I discussed the results of all of the tests and the disposition. The questions were answered. The patient or family acknowledged understanding and was agreeable with the care plan.       Medical Decision Making    History:    Supplemental history from: Documented in chart, if applicable and EMS    External Record(s) reviewed: Documented in chart, if applicable., Inpatient Record: . and Outpatient Record: .    Work Up:    Chart documentation includes differential considered and any EKGs or imaging independently interpreted by provider.    In additional to work up documented, I considered the following work up: Documented in chart, if applicable.    External consultation:    Discussion of management with another provider: Documented in chart, if applicable    Complicating factors:    Care impacted by chronic illness: Dementia    Care affected by social determinants of health: N/A    Disposition considerations: Discharge. I prescribed additional prescription strength medication(s) as charted. N/A.      MEDICATIONS GIVEN IN THE EMERGENCY:  Medications   Tdap (tetanus-diphtheria-acell pertussis) (ADACEL) injection 0.5 mL (0.5 mLs Intramuscular Given 1/30/23 0705)       NEW PRESCRIPTIONS STARTED AT TODAY'S ER VISIT  New Prescriptions    No medications on file          =================================================================    HPI    Triage note  \" \"      Patient information was obtained from: EMS    Use of : N/A         Brooke MELO" Rojas is a 89 year old female with a pertinent history of CRI, asthma, hyperlipidemia, hypertension, who presents to this ED via EMS for evaluation of fall.     Per EMS, the patient comes from [facility] care unit where there is a camera system in room and staff witness the patient fall out of bed. She does not normally walk. She is COVID-19 positive. EMS was not able to obtain how long she had COVID-19. Endorses laceration over forehead. She is not anticoagulated.  Last tenuous shot was in 2012. Denies any other complaints or concerns at the moment.       REVIEW OF SYSTEMS   Review of Systems   Constitutional: Negative for chills and fever.   Respiratory: Negative for cough and shortness of breath.    Cardiovascular: Negative for chest pain.   Gastrointestinal: Negative for abdominal pain.   Skin: Positive for wound (laceration over forehead).        PAST MEDICAL HISTORY:  Past Medical History:   Diagnosis Date     Asthma      CRI (chronic renal insufficiency), stage 3 (moderate) (H) 2/12/2020     HLD (hyperlipidemia)      HTN (hypertension)      Hypokalemia      Hypothyroidism      Multiple fractures of ribs of right side (6 through 9) with routine healing 10/14/2021     UTI (urinary tract infection)      Weakness of both lower extremities        PAST SURGICAL HISTORY:  Past Surgical History:   Procedure Laterality Date     ENT SURGERY       TONSILLECTOMY       TONSILLECTOMY             CURRENT MEDICATIONS:    acetaminophen (TYLENOL) 500 MG tablet  albuterol (VENTOLIN HFA) 108 (90 Base) MCG/ACT inhaler  amLODIPine (NORVASC) 5 MG tablet  calcium citrate 250 MG TABS  Calcium-Vitamin D (CALTRATE 600 PLUS-VIT D OR)  cholecalciferol 25 MCG (1000 UT) TABS  co-enzyme Q-10 30 MG CAPS capsule  coenzyme Q-10 200 MG CAPS  fluticasone-salmeterol (ADVAIR DISKUS) 100-50 MCG/DOSE inhaler  hydrochlorothiazide (HYDRODIURIL) 12.5 MG tablet  ibuprofen (ADVIL/MOTRIN) 400 MG tablet  levothyroxine (SYNTHROID/LEVOTHROID) 50 MCG  "tablet  Lidocaine (LIDOCARE) 4 % Patch  multivitamin (CENTRUM SILVER) tablet  ORDER FOR DME  oxyCODONE (ROXICODONE) 5 MG tablet  potassium chloride ER (K-TAB/KLOR-CON) 10 MEQ CR tablet  simvastatin (ZOCOR) 20 MG tablet        ALLERGIES:  Allergies   Allergen Reactions     Atorvastatin      Other reaction(s): Muscle Cramps  Muscle aches     Docosahexaenoic Acid (Dha)      Fosamax      Niaspan [Niacin]      Omega-3-Acid Ethyl Esters      Zyrtec-D        FAMILY HISTORY:  Family History   Problem Relation Age of Onset     Cancer - colorectal Father          age 94     C.A.D. Father          age 94     Melanoma Mother      Breast Cancer Sister          age 54     Cancer Father         colorectal      Coronary Artery Disease Father      Breast Cancer Sister        SOCIAL HISTORY:   Social History     Socioeconomic History     Marital status:    Tobacco Use     Smoking status: Never     Smokeless tobacco: Never   Substance and Sexual Activity     Alcohol use: No     Drug use: No       VITALS:  /60   Pulse 74   Temp 98.7  F (37.1  C) (Oral)   Resp 16   Ht 1.549 m (5' 1\")   Wt 56.2 kg (124 lb)   SpO2 97%   BMI 23.43 kg/m      PHYSICAL EXAM      Vitals: /60   Pulse 74   Temp 98.7  F (37.1  C) (Oral)   Resp 16   Ht 1.549 m (5' 1\")   Wt 56.2 kg (124 lb)   SpO2 97%   BMI 23.43 kg/m    /60   Pulse 74   Temp 98.7  F (37.1  C) (Oral)   Resp 16   Ht 1.549 m (5' 1\")   Wt 56.2 kg (124 lb)   SpO2 97%   BMI 23.43 kg/m      General Appearance: Alert, cooperative, normal speech and facial symmetry,  appears stated age. Follow commands, unsure what happened and unsure how long diagnosis with COVID-19 or what COVID-19 is.     Primary survey:     Airway patent  Breath sounds: bilateral breath sounds  Cardiovascular: 2+ radial pulses and DP pulses  Disability GCS 15    Secondary survey    Head:  Matted blood over right scalp. Right eyebrow swelling and right eyebrow laceration.  " Swelling to upper lip.  No loose teeth  Eyes:  PERRL,pupils midsized, conjunctiva/corneas clear, EOM's intact, no orbital injury  ENT:  No epistaxis.  Extraocular movements are intact.  No evidence of orbital injury.   Swelling to upper lips.   Neck:  No midline cervical spine tenderness.  No paraspinal tenderness.  Supple, symmetrical, trachea midline, no stridor or dysphonia, no soft tissue swelling or tenderness  Chest:  No tenderness or deformity, no crepitus  Cardio:  Regular rate and rhythm  Pulm:  Clear to auscultation bilaterally, respirations unlabored,   Back:   no CVA tenderness, no spinal tenderness.  No tenderness over T1 in particular  Abdomen:  Soft, non-tender  Extremities: Right shoulder tenderness.  Painful range of motion of right shoulder.  Right elbow, wrist, left shoulder, left elbow, left wrist full range of motion.  No pain with range of motion of bilateral hips, knees, or ankles  Skin:  Skin color, texture, turgor normal, no rashes or lesions.   Neuro:  Awake, alert, responsive to voice, follows commands, normal speech, No gross motor weakness or sensory loss, moves all extremities, GCS 15         LAB:  All pertinent labs reviewed and interpreted.  Results for orders placed or performed during the hospital encounter of 01/30/23   Head CT w/o contrast    Impression    IMPRESSION:  1.  No acute traumatic intracranial process. Specifically, no acute hemorrhage or abnormal extra-axial fluid collection.  2.  Age-indeterminate right basal ganglia lacunar type infarction.  3.  Chronic senescent and presumed microvascular ischemic changes, as above.                  Cervical spine CT w/o contrast    Impression    IMPRESSION:  1.  No cervical spine fracture.  2.  Age-indeterminate superior endplate compression deformity identified involving T1 with approximately 30-40% height loss noted anteriorly.  3.  Degenerative changes, as above.                  CT Facial Bones without Contrast    Impression     IMPRESSION:   1.  No facial bone fracture or malalignment.     XR Shoulder Right G/E 3 Views    Impression    IMPRESSION: No fracture. Normal alignment. Mild glenohumeral degenerative changes.   Basic metabolic panel   Result Value Ref Range    Sodium 134 (L) 136 - 145 mmol/L    Potassium 4.0 3.4 - 5.3 mmol/L    Chloride 99 98 - 107 mmol/L    Carbon Dioxide (CO2) 22 22 - 29 mmol/L    Anion Gap 13 7 - 15 mmol/L    Urea Nitrogen 17.5 8.0 - 23.0 mg/dL    Creatinine 0.81 0.51 - 0.95 mg/dL    Calcium 9.1 8.2 - 9.6 mg/dL    Glucose 92 70 - 99 mg/dL    GFR Estimate 69 >60 mL/min/1.73m2   Result Value Ref Range    INR 1.15 0.85 - 1.15   Result Value Ref Range    aPTT 28 22 - 38 Seconds   CBC with platelets and differential   Result Value Ref Range    WBC Count 8.7 4.0 - 11.0 10e3/uL    RBC Count 4.67 3.80 - 5.20 10e6/uL    Hemoglobin 14.5 11.7 - 15.7 g/dL    Hematocrit 43.0 35.0 - 47.0 %    MCV 92 78 - 100 fL    MCH 31.0 26.5 - 33.0 pg    MCHC 33.7 31.5 - 36.5 g/dL    RDW 13.2 10.0 - 15.0 %    Platelet Count 186 150 - 450 10e3/uL    % Neutrophils 79 %    % Lymphocytes 10 %    % Monocytes 11 %    % Eosinophils 0 %    % Basophils 0 %    % Immature Granulocytes 0 %    NRBCs per 100 WBC 0 <1 /100    Absolute Neutrophils 6.8 1.6 - 8.3 10e3/uL    Absolute Lymphocytes 0.9 0.8 - 5.3 10e3/uL    Absolute Monocytes 1.0 0.0 - 1.3 10e3/uL    Absolute Eosinophils 0.0 0.0 - 0.7 10e3/uL    Absolute Basophils 0.0 0.0 - 0.2 10e3/uL    Absolute Immature Granulocytes 0.0 <=0.4 10e3/uL    Absolute NRBCs 0.0 10e3/uL   Extra Red Top Tube   Result Value Ref Range    Hold Specimen [facility]        RADIOLOGY:  Reviewed all pertinent imaging. Please see official radiology report.  XR Shoulder Right G/E 3 Views   Final Result   IMPRESSION: No fracture. Normal alignment. Mild glenohumeral degenerative changes.      Cervical spine CT w/o contrast   Final Result   IMPRESSION:   1.  No cervical spine fracture.   2.  Age-indeterminate superior endplate  compression deformity identified involving T1 with approximately 30-40% height loss noted anteriorly.   3.  Degenerative changes, as above.                          CT Facial Bones without Contrast   Final Result   IMPRESSION:    1.  No facial bone fracture or malalignment.         Head CT w/o contrast   Final Result   IMPRESSION:   1.  No acute traumatic intracranial process. Specifically, no acute hemorrhage or abnormal extra-axial fluid collection.   2.  Age-indeterminate right basal ganglia lacunar type infarction.   3.  Chronic senescent and presumed microvascular ischemic changes, as above.                               PROCEDURES:   PROCEDURE: Laceration Repair   INDICATIONS: Laceration   PROCEDURE PROVIDER: Dr Roc Patel   SITE: Right eyebrow   TYPE/SIZE: simple, clean and no foreign body visualized  2 cm (total length)   FUNCTIONAL ASSESSMENT: Distal sensation, circulation and motor intact   MEDICATION: 3 mLs of 1% Lidocaine with epinephrine   PREPARATION: irrigation with Sterile water   DEBRIDEMENT: no debridement   CLOSURE:  Superficial layer closed with 3 stitches of 4-0 Fast Absorbing simple interrupted    Total number of sutures/staples placed: 3           I, Shilpa Navarro, am serving as a scribe to document services personally performed by Mirza Patel MD based on my observation and the provider's statements to me. I, Dr. Mirza Patel, attest that Shilpa Navarro is acting in a scribe capacity, has observed my performance of the services and has documented them in accordance with my direction.    Mirza Patel MD  Emergency Medicine  [facility] EMERGENCY DEPARTMENT  [address] [place] [id number]  [phone]       Mirza Patel MD  01/30/23 0780
